# Patient Record
Sex: MALE | Race: WHITE | NOT HISPANIC OR LATINO | Employment: FULL TIME | URBAN - METROPOLITAN AREA
[De-identification: names, ages, dates, MRNs, and addresses within clinical notes are randomized per-mention and may not be internally consistent; named-entity substitution may affect disease eponyms.]

---

## 2021-09-29 ENCOUNTER — LAB (OUTPATIENT)
Dept: LAB | Facility: HOSPITAL | Age: 46
End: 2021-09-29

## 2021-09-29 ENCOUNTER — OFFICE VISIT (OUTPATIENT)
Dept: INTERNAL MEDICINE | Facility: CLINIC | Age: 46
End: 2021-09-29

## 2021-09-29 VITALS
HEIGHT: 73 IN | SYSTOLIC BLOOD PRESSURE: 110 MMHG | DIASTOLIC BLOOD PRESSURE: 74 MMHG | TEMPERATURE: 96.9 F | WEIGHT: 188 LBS | HEART RATE: 76 BPM | OXYGEN SATURATION: 98 % | BODY MASS INDEX: 24.92 KG/M2

## 2021-09-29 DIAGNOSIS — Z00.00 ROUTINE GENERAL MEDICAL EXAMINATION AT A HEALTH CARE FACILITY: ICD-10-CM

## 2021-09-29 DIAGNOSIS — E78.49 OTHER HYPERLIPIDEMIA: Primary | ICD-10-CM

## 2021-09-29 DIAGNOSIS — Z12.11 SCREEN FOR COLON CANCER: ICD-10-CM

## 2021-09-29 DIAGNOSIS — Z12.5 SCREENING FOR PROSTATE CANCER: ICD-10-CM

## 2021-09-29 DIAGNOSIS — M17.0 PRIMARY OSTEOARTHRITIS OF BOTH KNEES: ICD-10-CM

## 2021-09-29 DIAGNOSIS — M51.37 DEGENERATION OF LUMBAR OR LUMBOSACRAL INTERVERTEBRAL DISC: ICD-10-CM

## 2021-09-29 PROBLEM — M51.379 DEGENERATION OF LUMBAR OR LUMBOSACRAL INTERVERTEBRAL DISC: Status: ACTIVE | Noted: 2021-09-29

## 2021-09-29 PROBLEM — I48.20 CHRONIC ATRIAL FIBRILLATION: Status: ACTIVE | Noted: 2021-09-29

## 2021-09-29 LAB
BILIRUB BLD-MCNC: NEGATIVE MG/DL
CHOLEST SERPL-MCNC: 230 MG/DL (ref 0–200)
CLARITY, POC: ABNORMAL
COLOR UR: YELLOW
DEPRECATED RDW RBC AUTO: 43.2 FL (ref 37–54)
ERYTHROCYTE [DISTWIDTH] IN BLOOD BY AUTOMATED COUNT: 13.6 % (ref 12.3–15.4)
GLUCOSE UR STRIP-MCNC: NEGATIVE MG/DL
HCT VFR BLD AUTO: 46.9 % (ref 37.5–51)
HDLC SERPL-MCNC: 47 MG/DL (ref 40–60)
HGB BLD-MCNC: 15.9 G/DL (ref 13–17.7)
KETONES UR QL: NEGATIVE
LDLC SERPL CALC-MCNC: 167 MG/DL (ref 0–100)
LDLC/HDLC SERPL: 3.51 {RATIO}
LEUKOCYTE EST, POC: NEGATIVE
MCH RBC QN AUTO: 29.7 PG (ref 26.6–33)
MCHC RBC AUTO-ENTMCNC: 33.9 G/DL (ref 31.5–35.7)
MCV RBC AUTO: 87.5 FL (ref 79–97)
NITRITE UR-MCNC: NEGATIVE MG/ML
PH UR: 6 [PH] (ref 5–8)
PLATELET # BLD AUTO: 186 10*3/MM3 (ref 140–450)
PMV BLD AUTO: 11.1 FL (ref 6–12)
PROT UR STRIP-MCNC: NEGATIVE MG/DL
RBC # BLD AUTO: 5.36 10*6/MM3 (ref 4.14–5.8)
RBC # UR STRIP: NEGATIVE /UL
SP GR UR: 1.02 (ref 1–1.03)
TRIGL SERPL-MCNC: 89 MG/DL (ref 0–150)
UROBILINOGEN UR QL: NORMAL
VLDLC SERPL-MCNC: 16 MG/DL (ref 5–40)
WBC # BLD AUTO: 7.67 10*3/MM3 (ref 3.4–10.8)

## 2021-09-29 PROCEDURE — 80061 LIPID PANEL: CPT | Performed by: INTERNAL MEDICINE

## 2021-09-29 PROCEDURE — G0103 PSA SCREENING: HCPCS | Performed by: INTERNAL MEDICINE

## 2021-09-29 PROCEDURE — 84443 ASSAY THYROID STIM HORMONE: CPT | Performed by: INTERNAL MEDICINE

## 2021-09-29 PROCEDURE — 81003 URINALYSIS AUTO W/O SCOPE: CPT | Performed by: INTERNAL MEDICINE

## 2021-09-29 PROCEDURE — 82607 VITAMIN B-12: CPT | Performed by: INTERNAL MEDICINE

## 2021-09-29 PROCEDURE — 85027 COMPLETE CBC AUTOMATED: CPT | Performed by: INTERNAL MEDICINE

## 2021-09-29 PROCEDURE — 99204 OFFICE O/P NEW MOD 45 MIN: CPT | Performed by: INTERNAL MEDICINE

## 2021-09-29 RX ORDER — ATORVASTATIN CALCIUM 10 MG/1
10 TABLET, FILM COATED ORAL NIGHTLY
COMMUNITY
Start: 2021-09-14 | End: 2021-10-01 | Stop reason: DRUGHIGH

## 2021-09-29 RX ORDER — TIZANIDINE 4 MG/1
TABLET ORAL
COMMUNITY
Start: 2021-09-14

## 2021-09-29 NOTE — PROGRESS NOTES
Patient is a 45 y.o. male who is here to establish care for back pain and knee pain.  Chief Complaint   Patient presents with   • Back Pain   • Knee Pain         HPI:    Here to manage low back pain.  Has hx of lumbar facet injections.  Also has issues with L>R knee pain.  Pain has been chronic.  Is very active with his work.  Using otc meds.  No difficulty sleeping.  Worst with standing and sitting for prolonged time.    Has hx of hyperlipidemia.  Compliant with Lipitor.  No dizziness or lightheadedness or HAs.  No abdominal pains.      History:     Patient Active Problem List   Diagnosis   • Chronic atrial fibrillation (CMS/HCC)   • Other hyperlipidemia   • Primary osteoarthritis of both knees   • Degeneration of lumbar or lumbosacral intervertebral disc       Past Medical History:   Diagnosis Date   • Nephrolithiasis        Past Surgical History:   Procedure Laterality Date   • APPENDECTOMY  09/2010   • CARDIAC ABLATION  11/2019   • CERVICAL FUSION  10/2012   • KNEE ARTHROSCOPY     • SHOULDER ARTHROSCOPY         Current Outpatient Medications on File Prior to Visit   Medication Sig   • atorvastatin (LIPITOR) 10 MG tablet Take 10 mg by mouth Every Night.   • tiZANidine (ZANAFLEX) 4 MG tablet      No current facility-administered medications on file prior to visit.       Family History   Problem Relation Age of Onset   • Heart attack Mother    • Cancer Maternal Grandmother    • Lung cancer Paternal Grandfather        Social History     Socioeconomic History   • Marital status:      Spouse name: Not on file   • Number of children: Not on file   • Years of education: Not on file   • Highest education level: Not on file   Tobacco Use   • Smoking status: Never Smoker   • Smokeless tobacco: Never Used   Substance and Sexual Activity   • Alcohol use: Yes   • Drug use: Never         Review of Systems   Constitutional: Negative for chills, fatigue, fever and unexpected weight change.   HENT: Negative for congestion,  "ear pain, hearing loss, rhinorrhea, sinus pressure, sore throat and trouble swallowing.    Eyes: Negative for discharge and itching.   Respiratory: Negative for cough, chest tightness and shortness of breath.    Cardiovascular: Negative for chest pain, palpitations and leg swelling.   Gastrointestinal: Negative for abdominal pain, blood in stool, constipation, diarrhea and vomiting.   Endocrine: Negative for polydipsia and polyuria.   Genitourinary: Negative for difficulty urinating, dysuria, enuresis, frequency, hematuria and urgency.        9/21 psa   Musculoskeletal: Negative for arthralgias, back pain, gait problem and joint swelling.   Skin: Negative for rash and wound.   Allergic/Immunologic: Negative for immunocompromised state.   Neurological: Negative for dizziness, syncope, weakness, light-headedness, numbness and headaches.   Hematological: Does not bruise/bleed easily.   Psychiatric/Behavioral: Negative for behavioral problems, dysphoric mood and sleep disturbance. The patient is not nervous/anxious.        /74   Pulse 76   Temp 96.9 °F (36.1 °C) (Infrared)   Ht 185.4 cm (73\")   Wt 85.3 kg (188 lb)   SpO2 98%   BMI 24.80 kg/m²       Physical Exam  Constitutional:       Appearance: Normal appearance. He is well-developed.   HENT:      Head: Normocephalic and atraumatic.      Right Ear: External ear normal.      Left Ear: External ear normal.      Nose: Nose normal.      Mouth/Throat:      Mouth: Mucous membranes are moist.      Pharynx: Oropharynx is clear.   Eyes:      Extraocular Movements: Extraocular movements intact.      Conjunctiva/sclera: Conjunctivae normal.      Pupils: Pupils are equal, round, and reactive to light.   Cardiovascular:      Rate and Rhythm: Normal rate and regular rhythm.      Pulses: Normal pulses.      Heart sounds: Normal heart sounds.   Pulmonary:      Effort: Pulmonary effort is normal.      Breath sounds: Normal breath sounds.   Abdominal:      General: Bowel " sounds are normal.      Palpations: Abdomen is soft.   Musculoskeletal:         General: Normal range of motion.      Cervical back: Normal range of motion and neck supple.   Lymphadenopathy:      Cervical: No cervical adenopathy.   Skin:     General: Skin is warm and dry.   Neurological:      General: No focal deficit present.      Mental Status: He is alert and oriented to person, place, and time.   Psychiatric:         Mood and Affect: Mood normal.         Behavior: Behavior normal.         Thought Content: Thought content normal.         Procedure:      Discussion/Summary:    Hyperlipidemia-counseled on diet, fasting labs today not at goal, advised to increase to Lipitor 20 mg  Hx of Afib-no recurrence with the ablation  Knee osteoarthritis-ortho to see  Low back pain-schedule mri  Other-schedule colonoscopy screening and fasting labs today    9/29 labs noted and dw patient    Current Outpatient Medications:   •  atorvastatin (LIPITOR) 10 MG tablet, Take 10 mg by mouth Every Night., Disp: , Rfl:   •  tiZANidine (ZANAFLEX) 4 MG tablet, , Disp: , Rfl:         Diagnoses and all orders for this visit:    1. Other hyperlipidemia (Primary)  -     Lipid Panel  -     TSH    2. Primary osteoarthritis of both knees  -     Cancel: Ambulatory Referral to Orthopedic Surgery  -     Ambulatory Referral to Orthopedic Surgery    3. Degeneration of lumbar or lumbosacral intervertebral disc  -     MRI Lumbar Spine Without Contrast    4. Screening for prostate cancer  -     PSA Screen    5. Screen for colon cancer  -     Ambulatory Referral to Gastroenterology    6. Routine general medical examination at a health care facility  -     CBC (No Diff)  -     Vitamin B12  -     POC Urinalysis Dipstick, Automated

## 2021-09-30 LAB
PSA SERPL-MCNC: 0.9 NG/ML (ref 0–4)
TSH SERPL DL<=0.05 MIU/L-ACNC: 2.67 UIU/ML (ref 0.27–4.2)
VIT B12 BLD-MCNC: 597 PG/ML (ref 211–946)

## 2021-10-01 ENCOUNTER — TELEPHONE (OUTPATIENT)
Dept: INTERNAL MEDICINE | Facility: CLINIC | Age: 46
End: 2021-10-01

## 2021-10-01 RX ORDER — ATORVASTATIN CALCIUM 20 MG/1
20 TABLET, FILM COATED ORAL NIGHTLY
Qty: 90 TABLET | Refills: 3 | Status: SHIPPED | OUTPATIENT
Start: 2021-10-01 | End: 2022-10-13

## 2021-10-01 NOTE — TELEPHONE ENCOUNTER
Caller: Ronaldo Cantu    Relationship: Self    Best call back number:     [phone]     What is the best time to reach you:     ANY TIME    Who are you requesting to speak with (clinical staff, provider,  specific staff member):     DR RIVAS    Do you know the name of the person who called:     N/A    What was the call regarding:    atorvastatin (LIPITOR) 10 MG tablet    PATIENT STATED DR RIVAS DECIDED TO INCREASE THE DOSAGE OF THE MEDICATION LISTED ABOVE SINCE PATIENT'S BLOODWORK SHOWED AN INCREASE IN NUMBERS    Do you require a callback:    YES IF THERE ARE ANY QUESTIONS FOR PATIENT    DR RIVAS

## 2021-10-21 ENCOUNTER — HOSPITAL ENCOUNTER (OUTPATIENT)
Dept: MRI IMAGING | Facility: HOSPITAL | Age: 46
Discharge: HOME | End: 2021-10-21
Admitting: INTERNAL MEDICINE

## 2021-10-21 PROCEDURE — 72148 MRI LUMBAR SPINE W/O DYE: CPT

## 2021-10-22 DIAGNOSIS — M51.37 DEGENERATION OF LUMBAR OR LUMBOSACRAL INTERVERTEBRAL DISC: Primary | ICD-10-CM

## 2021-11-24 RX ORDER — SOD SULF/POT CHLORIDE/MAG SULF 1.479 G
1 TABLET ORAL ONCE
Qty: 24 TABLET | Refills: 0 | Status: SHIPPED | OUTPATIENT
Start: 2021-11-24 | End: 2021-11-24

## 2021-12-09 ENCOUNTER — OUTSIDE FACILITY SERVICE (OUTPATIENT)
Dept: GASTROENTEROLOGY | Facility: CLINIC | Age: 46
End: 2021-12-09

## 2021-12-09 PROCEDURE — 45380 COLONOSCOPY AND BIOPSY: CPT | Performed by: INTERNAL MEDICINE

## 2022-03-28 ENCOUNTER — LAB (OUTPATIENT)
Dept: LAB | Facility: HOSPITAL | Age: 47
End: 2022-03-28

## 2022-03-28 ENCOUNTER — OFFICE VISIT (OUTPATIENT)
Dept: INTERNAL MEDICINE | Facility: CLINIC | Age: 47
End: 2022-03-28

## 2022-03-28 VITALS
HEIGHT: 73 IN | WEIGHT: 182 LBS | HEART RATE: 78 BPM | BODY MASS INDEX: 24.12 KG/M2 | SYSTOLIC BLOOD PRESSURE: 120 MMHG | TEMPERATURE: 96.9 F | OXYGEN SATURATION: 99 % | DIASTOLIC BLOOD PRESSURE: 76 MMHG

## 2022-03-28 DIAGNOSIS — B35.1 TOENAIL FUNGUS: ICD-10-CM

## 2022-03-28 DIAGNOSIS — M17.0 PRIMARY OSTEOARTHRITIS OF BOTH KNEES: ICD-10-CM

## 2022-03-28 DIAGNOSIS — E78.49 OTHER HYPERLIPIDEMIA: Primary | ICD-10-CM

## 2022-03-28 LAB
DEPRECATED RDW RBC AUTO: 40.4 FL (ref 37–54)
ERYTHROCYTE [DISTWIDTH] IN BLOOD BY AUTOMATED COUNT: 12.6 % (ref 12.3–15.4)
HCT VFR BLD AUTO: 44.1 % (ref 37.5–51)
HGB BLD-MCNC: 15.2 G/DL (ref 13–17.7)
MCH RBC QN AUTO: 30.3 PG (ref 26.6–33)
MCHC RBC AUTO-ENTMCNC: 34.5 G/DL (ref 31.5–35.7)
MCV RBC AUTO: 88 FL (ref 79–97)
PLATELET # BLD AUTO: 171 10*3/MM3 (ref 140–450)
PMV BLD AUTO: 11.6 FL (ref 6–12)
RBC # BLD AUTO: 5.01 10*6/MM3 (ref 4.14–5.8)
WBC NRBC COR # BLD: 8.11 10*3/MM3 (ref 3.4–10.8)

## 2022-03-28 PROCEDURE — 85027 COMPLETE CBC AUTOMATED: CPT | Performed by: INTERNAL MEDICINE

## 2022-03-28 PROCEDURE — 80061 LIPID PANEL: CPT | Performed by: INTERNAL MEDICINE

## 2022-03-28 PROCEDURE — 99214 OFFICE O/P EST MOD 30 MIN: CPT | Performed by: INTERNAL MEDICINE

## 2022-03-28 PROCEDURE — 80053 COMPREHEN METABOLIC PANEL: CPT | Performed by: INTERNAL MEDICINE

## 2022-03-28 NOTE — PROGRESS NOTES
Patient is a 46 y.o. male who is here for a follow up of hyperlipidemia.  Chief Complaint   Patient presents with   • Hyperlipidemia         HPI:    Here for mgmt of hyperlipidemia.  Compliant with lipitor.  Since Covid booster has loss of appetite/HAs and cramps in feet.  Energy level is good.  Easily fatigued.      History:     Patient Active Problem List   Diagnosis   • Chronic atrial fibrillation (HCC)   • Other hyperlipidemia   • Primary osteoarthritis of both knees   • Degeneration of lumbar or lumbosacral intervertebral disc   • Toenail fungus       Past Medical History:   Diagnosis Date   • Nephrolithiasis        Past Surgical History:   Procedure Laterality Date   • APPENDECTOMY  09/2010   • CARDIAC ABLATION  11/2019   • CERVICAL FUSION  10/2012   • KNEE ARTHROSCOPY     • SHOULDER ARTHROSCOPY         Current Outpatient Medications on File Prior to Visit   Medication Sig   • atorvastatin (LIPITOR) 20 MG tablet Take 1 tablet by mouth Every Night. Increase in strength   • tiZANidine (ZANAFLEX) 4 MG tablet      No current facility-administered medications on file prior to visit.       Family History   Problem Relation Age of Onset   • Heart attack Mother    • Cancer Maternal Grandmother    • Lung cancer Paternal Grandfather        Social History     Socioeconomic History   • Marital status:    Tobacco Use   • Smoking status: Never Smoker   • Smokeless tobacco: Never Used   Substance and Sexual Activity   • Alcohol use: Yes   • Drug use: Never         Review of Systems   Constitutional: Negative for chills, fatigue, fever and unexpected weight change.   HENT: Negative for congestion, ear pain, hearing loss, rhinorrhea, sinus pressure, sore throat and trouble swallowing.    Eyes: Negative for discharge and itching.   Respiratory: Negative for cough, chest tightness and shortness of breath.    Cardiovascular: Negative for chest pain, palpitations and leg swelling.   Gastrointestinal: Negative for abdominal  "pain, blood in stool, constipation, diarrhea and vomiting.        12/21 without polyps    Endocrine: Negative for polydipsia and polyuria.   Genitourinary: Negative for difficulty urinating, dysuria, enuresis, frequency, hematuria and urgency.        9/21 psa   Musculoskeletal: Negative for arthralgias, back pain, gait problem and joint swelling.   Skin: Negative for rash and wound.   Allergic/Immunologic: Negative for immunocompromised state.   Neurological: Positive for headaches. Negative for dizziness, syncope, weakness, light-headedness and numbness.   Hematological: Does not bruise/bleed easily.   Psychiatric/Behavioral: Negative for behavioral problems, dysphoric mood and sleep disturbance. The patient is not nervous/anxious.        /76   Pulse 78   Temp 96.9 °F (36.1 °C) (Infrared)   Ht 185.4 cm (72.99\")   Wt 82.6 kg (182 lb)   SpO2 99%   BMI 24.02 kg/m²       Physical Exam  Constitutional:       Appearance: Normal appearance. He is well-developed.   HENT:      Head: Normocephalic and atraumatic.      Right Ear: External ear normal.      Left Ear: External ear normal.      Nose: Nose normal.      Mouth/Throat:      Mouth: Mucous membranes are moist.      Pharynx: Oropharynx is clear.   Eyes:      Extraocular Movements: Extraocular movements intact.      Conjunctiva/sclera: Conjunctivae normal.      Pupils: Pupils are equal, round, and reactive to light.   Cardiovascular:      Rate and Rhythm: Normal rate and regular rhythm.      Pulses: Normal pulses.      Heart sounds: Normal heart sounds.   Pulmonary:      Effort: Pulmonary effort is normal.      Breath sounds: Normal breath sounds.   Abdominal:      General: Bowel sounds are normal.      Palpations: Abdomen is soft.   Musculoskeletal:         General: Normal range of motion.      Cervical back: Normal range of motion and neck supple.   Lymphadenopathy:      Cervical: No cervical adenopathy.   Skin:     General: Skin is warm and dry.      " Comments: Left 1st toenail thickening and yellow   Neurological:      General: No focal deficit present.      Mental Status: He is alert and oriented to person, place, and time.   Psychiatric:         Mood and Affect: Mood normal.         Behavior: Behavior normal.         Thought Content: Thought content normal.         Procedure:      Discussion/Summary:    Hyperlipidemia-counseled on diet, fasting labs today on Lipitor 20 mg  Hx of Afib-no recurrence with the ablation  Knee osteoarthritis-f/u Ortho  Nail fungus-check LFTs , if normal will rx lamisil     3/28 labs noted and dw patient    Current Outpatient Medications:   •  atorvastatin (LIPITOR) 20 MG tablet, Take 1 tablet by mouth Every Night. Increase in strength, Disp: 90 tablet, Rfl: 3  •  tiZANidine (ZANAFLEX) 4 MG tablet, , Disp: , Rfl:   •  terbinafine (LamISIL) 250 MG tablet, Take 1 tablet by mouth Daily. Need liver test after 1 month, Disp: 90 tablet, Rfl: 0        Diagnoses and all orders for this visit:    1. Other hyperlipidemia (Primary)  -     Comprehensive Metabolic Panel  -     Lipid Panel    2. Primary osteoarthritis of both knees  -     CBC (No Diff)    3. Toenail fungus  -     terbinafine (LamISIL) 250 MG tablet; Take 1 tablet by mouth Daily. Need liver test after 1 month  Dispense: 90 tablet; Refill: 0        Answers for HPI/ROS submitted by the patient on 3/28/2022  What is the primary reason for your visit?: Other  Please describe your symptoms.: Follow up  Have you had these symptoms before?: No  How long have you been having these symptoms?: Greater than 2 weeks

## 2022-03-29 LAB
ALBUMIN SERPL-MCNC: 4.3 G/DL (ref 3.5–5.2)
ALBUMIN/GLOB SERPL: 1.5 G/DL
ALP SERPL-CCNC: 85 U/L (ref 39–117)
ALT SERPL W P-5'-P-CCNC: 26 U/L (ref 1–41)
ANION GAP SERPL CALCULATED.3IONS-SCNC: 12.1 MMOL/L (ref 5–15)
AST SERPL-CCNC: 17 U/L (ref 1–40)
BILIRUB SERPL-MCNC: 0.4 MG/DL (ref 0–1.2)
BUN SERPL-MCNC: 12 MG/DL (ref 6–20)
BUN/CREAT SERPL: 11.9 (ref 7–25)
CALCIUM SPEC-SCNC: 9.4 MG/DL (ref 8.6–10.5)
CHLORIDE SERPL-SCNC: 103 MMOL/L (ref 98–107)
CHOLEST SERPL-MCNC: 153 MG/DL (ref 0–200)
CO2 SERPL-SCNC: 27.9 MMOL/L (ref 22–29)
CREAT SERPL-MCNC: 1.01 MG/DL (ref 0.76–1.27)
EGFRCR SERPLBLD CKD-EPI 2021: 91.7 ML/MIN/1.73
GLOBULIN UR ELPH-MCNC: 2.8 GM/DL
GLUCOSE SERPL-MCNC: 77 MG/DL (ref 65–99)
HDLC SERPL-MCNC: 50 MG/DL (ref 40–60)
LDLC SERPL CALC-MCNC: 86 MG/DL (ref 0–100)
LDLC/HDLC SERPL: 1.7 {RATIO}
POTASSIUM SERPL-SCNC: 4.1 MMOL/L (ref 3.5–5.2)
PROT SERPL-MCNC: 7.1 G/DL (ref 6–8.5)
SODIUM SERPL-SCNC: 143 MMOL/L (ref 136–145)
TRIGL SERPL-MCNC: 89 MG/DL (ref 0–150)
VLDLC SERPL-MCNC: 17 MG/DL (ref 5–40)

## 2022-03-29 RX ORDER — TERBINAFINE HYDROCHLORIDE 250 MG/1
250 TABLET ORAL DAILY
Qty: 90 TABLET | Refills: 0 | Status: SHIPPED | OUTPATIENT
Start: 2022-03-29 | End: 2022-09-30

## 2022-05-20 ENCOUNTER — TRANSCRIBE ORDERS (OUTPATIENT)
Dept: CARDIOLOGY | Facility: HOSPITAL | Age: 47
End: 2022-05-20

## 2022-05-20 ENCOUNTER — LAB (OUTPATIENT)
Dept: LAB | Facility: HOSPITAL | Age: 47
End: 2022-05-20

## 2022-05-20 DIAGNOSIS — Z01.810 PRE-OPERATIVE CARDIOVASCULAR EXAMINATION: Primary | ICD-10-CM

## 2022-05-20 DIAGNOSIS — Z01.810 PRE-OPERATIVE CARDIOVASCULAR EXAMINATION: ICD-10-CM

## 2022-05-20 DIAGNOSIS — Z01.812 PRE-OPERATIVE LABORATORY EXAMINATION: Primary | ICD-10-CM

## 2022-05-20 LAB
ALBUMIN SERPL-MCNC: 4.7 G/DL (ref 3.5–5.2)
ALBUMIN/GLOB SERPL: 2 G/DL
ALP SERPL-CCNC: 95 U/L (ref 39–117)
ALT SERPL W P-5'-P-CCNC: 22 U/L (ref 1–41)
ANION GAP SERPL CALCULATED.3IONS-SCNC: 13 MMOL/L (ref 5–15)
AST SERPL-CCNC: 17 U/L (ref 1–40)
BASOPHILS # BLD AUTO: 0.04 10*3/MM3 (ref 0–0.2)
BASOPHILS NFR BLD AUTO: 0.5 % (ref 0–1.5)
BILIRUB SERPL-MCNC: 0.3 MG/DL (ref 0–1.2)
BUN SERPL-MCNC: 12 MG/DL (ref 6–20)
BUN/CREAT SERPL: 10.7 (ref 7–25)
CALCIUM SPEC-SCNC: 9.4 MG/DL (ref 8.6–10.5)
CHLORIDE SERPL-SCNC: 101 MMOL/L (ref 98–107)
CO2 SERPL-SCNC: 26 MMOL/L (ref 22–29)
CREAT SERPL-MCNC: 1.12 MG/DL (ref 0.76–1.27)
DEPRECATED RDW RBC AUTO: 39.5 FL (ref 37–54)
EGFRCR SERPLBLD CKD-EPI 2021: 81 ML/MIN/1.73
EOSINOPHIL # BLD AUTO: 0.21 10*3/MM3 (ref 0–0.4)
EOSINOPHIL NFR BLD AUTO: 2.7 % (ref 0.3–6.2)
ERYTHROCYTE [DISTWIDTH] IN BLOOD BY AUTOMATED COUNT: 12.8 % (ref 12.3–15.4)
GLOBULIN UR ELPH-MCNC: 2.3 GM/DL
GLUCOSE SERPL-MCNC: 80 MG/DL (ref 65–99)
HCT VFR BLD AUTO: 46.6 % (ref 37.5–51)
HGB BLD-MCNC: 15.9 G/DL (ref 13–17.7)
IMM GRANULOCYTES # BLD AUTO: 0.02 10*3/MM3 (ref 0–0.05)
IMM GRANULOCYTES NFR BLD AUTO: 0.3 % (ref 0–0.5)
LYMPHOCYTES # BLD AUTO: 2.19 10*3/MM3 (ref 0.7–3.1)
LYMPHOCYTES NFR BLD AUTO: 27.8 % (ref 19.6–45.3)
MCH RBC QN AUTO: 29.5 PG (ref 26.6–33)
MCHC RBC AUTO-ENTMCNC: 34.1 G/DL (ref 31.5–35.7)
MCV RBC AUTO: 86.5 FL (ref 79–97)
MONOCYTES # BLD AUTO: 0.79 10*3/MM3 (ref 0.1–0.9)
MONOCYTES NFR BLD AUTO: 10 % (ref 5–12)
NEUTROPHILS NFR BLD AUTO: 4.63 10*3/MM3 (ref 1.7–7)
NEUTROPHILS NFR BLD AUTO: 58.7 % (ref 42.7–76)
NRBC BLD AUTO-RTO: 0 /100 WBC (ref 0–0.2)
PLATELET # BLD AUTO: 191 10*3/MM3 (ref 140–450)
PMV BLD AUTO: 10.8 FL (ref 6–12)
POTASSIUM SERPL-SCNC: 4.4 MMOL/L (ref 3.5–5.2)
PROT SERPL-MCNC: 7 G/DL (ref 6–8.5)
QT INTERVAL: 394 MS
QTC INTERVAL: 418 MS
RBC # BLD AUTO: 5.39 10*6/MM3 (ref 4.14–5.8)
SODIUM SERPL-SCNC: 140 MMOL/L (ref 136–145)
WBC NRBC COR # BLD: 7.88 10*3/MM3 (ref 3.4–10.8)

## 2022-05-20 PROCEDURE — 93010 ELECTROCARDIOGRAM REPORT: CPT | Performed by: INTERNAL MEDICINE

## 2022-05-20 PROCEDURE — 36415 COLL VENOUS BLD VENIPUNCTURE: CPT

## 2022-05-20 PROCEDURE — 80053 COMPREHEN METABOLIC PANEL: CPT

## 2022-05-20 PROCEDURE — 93005 ELECTROCARDIOGRAM TRACING: CPT

## 2022-05-20 PROCEDURE — 85025 COMPLETE CBC W/AUTO DIFF WBC: CPT

## 2022-09-30 ENCOUNTER — OFFICE VISIT (OUTPATIENT)
Dept: INTERNAL MEDICINE | Facility: CLINIC | Age: 47
End: 2022-09-30

## 2022-09-30 VITALS
SYSTOLIC BLOOD PRESSURE: 110 MMHG | BODY MASS INDEX: 24.12 KG/M2 | WEIGHT: 182 LBS | HEIGHT: 73 IN | HEART RATE: 76 BPM | OXYGEN SATURATION: 98 % | DIASTOLIC BLOOD PRESSURE: 70 MMHG | TEMPERATURE: 96.9 F

## 2022-09-30 DIAGNOSIS — I48.20 CHRONIC ATRIAL FIBRILLATION: ICD-10-CM

## 2022-09-30 DIAGNOSIS — Z00.00 ROUTINE GENERAL MEDICAL EXAMINATION AT A HEALTH CARE FACILITY: ICD-10-CM

## 2022-09-30 DIAGNOSIS — Z12.5 SCREENING FOR PROSTATE CANCER: ICD-10-CM

## 2022-09-30 DIAGNOSIS — E78.49 OTHER HYPERLIPIDEMIA: Primary | ICD-10-CM

## 2022-09-30 PROCEDURE — 99396 PREV VISIT EST AGE 40-64: CPT | Performed by: INTERNAL MEDICINE

## 2022-09-30 NOTE — PROGRESS NOTES
Patient is a 46 y.o. male who is here for a physical.  Chief Complaint   Patient presents with   • Annual Exam         HPI:    Here for CPE.      History:     Patient Active Problem List   Diagnosis   • Chronic atrial fibrillation (HCC)   • Other hyperlipidemia   • Primary osteoarthritis of both knees   • Degeneration of lumbar or lumbosacral intervertebral disc   • Toenail fungus       Past Medical History:   Diagnosis Date   • Nephrolithiasis        Past Surgical History:   Procedure Laterality Date   • APPENDECTOMY  09/2010   • CARDIAC ABLATION  11/2019   • CERVICAL FUSION  10/2012   • KNEE ARTHROSCOPY     • SHOULDER ARTHROSCOPY         Current Outpatient Medications on File Prior to Visit   Medication Sig   • atorvastatin (LIPITOR) 20 MG tablet Take 1 tablet by mouth Every Night. Increase in strength   • tiZANidine (ZANAFLEX) 4 MG tablet    • [DISCONTINUED] terbinafine (LamISIL) 250 MG tablet Take 1 tablet by mouth Daily. Need liver test after 1 month     No current facility-administered medications on file prior to visit.       Family History   Problem Relation Age of Onset   • Heart attack Mother    • Cancer Maternal Grandmother    • Lung cancer Paternal Grandfather        Social History     Socioeconomic History   • Marital status:    Tobacco Use   • Smoking status: Never Smoker   • Smokeless tobacco: Never Used   Substance and Sexual Activity   • Alcohol use: Yes   • Drug use: Never         Review of Systems   Constitutional: Negative for chills, fatigue, fever and unexpected weight change.   HENT: Negative for congestion, ear pain, hearing loss, rhinorrhea, sinus pressure, sore throat and trouble swallowing.    Eyes: Negative for discharge and itching.   Respiratory: Negative for cough, chest tightness and shortness of breath.    Cardiovascular: Negative for chest pain, palpitations and leg swelling.   Gastrointestinal: Negative for abdominal pain, blood in stool, constipation, diarrhea and vomiting.  "       12/21 without polyps    Endocrine: Negative for polydipsia and polyuria.   Genitourinary: Negative for difficulty urinating, dysuria, enuresis, frequency, hematuria and urgency.        9/21 psa   Musculoskeletal: Positive for arthralgias. Negative for back pain, gait problem and joint swelling.   Skin: Negative for rash and wound.   Allergic/Immunologic: Negative for immunocompromised state.   Neurological: Negative for dizziness, syncope, weakness, light-headedness and numbness.   Hematological: Does not bruise/bleed easily.   Psychiatric/Behavioral: Negative for behavioral problems, dysphoric mood and sleep disturbance. The patient is not nervous/anxious.        /70   Pulse 76   Temp 96.9 °F (36.1 °C) (Infrared)   Ht 185.4 cm (72.99\")   Wt 82.6 kg (182 lb)   SpO2 98%   BMI 24.02 kg/m²       Physical Exam  Constitutional:       Appearance: Normal appearance. He is well-developed.   HENT:      Head: Normocephalic and atraumatic.      Right Ear: External ear normal.      Left Ear: External ear normal.      Nose: Nose normal.      Mouth/Throat:      Mouth: Mucous membranes are moist.      Pharynx: Oropharynx is clear.   Eyes:      Extraocular Movements: Extraocular movements intact.      Conjunctiva/sclera: Conjunctivae normal.      Pupils: Pupils are equal, round, and reactive to light.   Cardiovascular:      Rate and Rhythm: Normal rate and regular rhythm.      Pulses: Normal pulses.      Heart sounds: Normal heart sounds.   Pulmonary:      Effort: Pulmonary effort is normal.      Breath sounds: Normal breath sounds.   Abdominal:      General: Bowel sounds are normal.      Palpations: Abdomen is soft.   Genitourinary:     Penis: Normal.       Testes: Normal.   Musculoskeletal:         General: Normal range of motion.      Cervical back: Normal range of motion and neck supple.   Lymphadenopathy:      Cervical: No cervical adenopathy.   Skin:     General: Skin is warm and dry.      Comments: Left " 1st toenail thickening and yellow   Neurological:      General: No focal deficit present.      Mental Status: He is alert and oriented to person, place, and time.   Psychiatric:         Mood and Affect: Mood normal.         Behavior: Behavior normal.         Thought Content: Thought content normal.         Procedure:      Discussion/Summary:    HME-counseled on diet and exercise, fasting labs soon  Hyperlipidemia-counseled on diet, fasting soon on Lipitor 20 mg  Hx of Afib-no recurrence with the ablation  Knee osteoarthritis-f/u Ortho     3/28 labs noted and dw patient    Reviewed the following with the patient: advised patient to avoid alcoholic beverages and encouraged patient to exercise 5-7 days per week for 30 minutes at a time.      Current Outpatient Medications:   •  atorvastatin (LIPITOR) 20 MG tablet, Take 1 tablet by mouth Every Night. Increase in strength, Disp: 90 tablet, Rfl: 3  •  tiZANidine (ZANAFLEX) 4 MG tablet, , Disp: , Rfl:         Diagnoses and all orders for this visit:    1. Other hyperlipidemia (Primary)  -     Lipid Panel; Future    2. Chronic atrial fibrillation (HCC)    3. Routine general medical examination at a health care facility  -     CBC (No Diff); Future  -     Comprehensive Metabolic Panel; Future  -     Lipid Panel; Future  -     PSA Screen; Future  -     TSH; Future  -     Vitamin B12; Future    4. Screening for prostate cancer  -     PSA Screen; Future

## 2022-10-13 RX ORDER — ATORVASTATIN CALCIUM 20 MG/1
TABLET, FILM COATED ORAL
Qty: 90 TABLET | Refills: 3 | Status: SHIPPED | OUTPATIENT
Start: 2022-10-13

## 2022-10-18 ENCOUNTER — LAB (OUTPATIENT)
Dept: LAB | Facility: HOSPITAL | Age: 47
End: 2022-10-18

## 2022-10-18 DIAGNOSIS — Z12.5 SCREENING FOR PROSTATE CANCER: ICD-10-CM

## 2022-10-18 DIAGNOSIS — E78.49 OTHER HYPERLIPIDEMIA: ICD-10-CM

## 2022-10-18 DIAGNOSIS — Z00.00 ROUTINE GENERAL MEDICAL EXAMINATION AT A HEALTH CARE FACILITY: ICD-10-CM

## 2022-10-18 PROCEDURE — 80061 LIPID PANEL: CPT

## 2022-10-18 PROCEDURE — 82607 VITAMIN B-12: CPT

## 2022-10-18 PROCEDURE — 80050 GENERAL HEALTH PANEL: CPT

## 2022-10-18 PROCEDURE — G0103 PSA SCREENING: HCPCS

## 2022-10-19 LAB
ALBUMIN SERPL-MCNC: 4.2 G/DL (ref 3.5–5.2)
ALBUMIN/GLOB SERPL: 1.6 G/DL
ALP SERPL-CCNC: 87 U/L (ref 39–117)
ALT SERPL W P-5'-P-CCNC: 34 U/L (ref 1–41)
ANION GAP SERPL CALCULATED.3IONS-SCNC: 8.8 MMOL/L (ref 5–15)
AST SERPL-CCNC: 27 U/L (ref 1–40)
BILIRUB SERPL-MCNC: 0.6 MG/DL (ref 0–1.2)
BUN SERPL-MCNC: 13 MG/DL (ref 6–20)
BUN/CREAT SERPL: 11.6 (ref 7–25)
CALCIUM SPEC-SCNC: 9.6 MG/DL (ref 8.6–10.5)
CHLORIDE SERPL-SCNC: 104 MMOL/L (ref 98–107)
CHOLEST SERPL-MCNC: 138 MG/DL (ref 0–200)
CO2 SERPL-SCNC: 27.2 MMOL/L (ref 22–29)
CREAT SERPL-MCNC: 1.12 MG/DL (ref 0.76–1.27)
DEPRECATED RDW RBC AUTO: 42.3 FL (ref 37–54)
EGFRCR SERPLBLD CKD-EPI 2021: 81 ML/MIN/1.73
ERYTHROCYTE [DISTWIDTH] IN BLOOD BY AUTOMATED COUNT: 13.1 % (ref 12.3–15.4)
GLOBULIN UR ELPH-MCNC: 2.6 GM/DL
GLUCOSE SERPL-MCNC: 83 MG/DL (ref 65–99)
HCT VFR BLD AUTO: 44.3 % (ref 37.5–51)
HDLC SERPL-MCNC: 50 MG/DL (ref 40–60)
HGB BLD-MCNC: 14.9 G/DL (ref 13–17.7)
LDLC SERPL CALC-MCNC: 73 MG/DL (ref 0–100)
LDLC/HDLC SERPL: 1.46 {RATIO}
MCH RBC QN AUTO: 29.7 PG (ref 26.6–33)
MCHC RBC AUTO-ENTMCNC: 33.6 G/DL (ref 31.5–35.7)
MCV RBC AUTO: 88.2 FL (ref 79–97)
PLATELET # BLD AUTO: 167 10*3/MM3 (ref 140–450)
PMV BLD AUTO: 10.9 FL (ref 6–12)
POTASSIUM SERPL-SCNC: 4.1 MMOL/L (ref 3.5–5.2)
PROT SERPL-MCNC: 6.8 G/DL (ref 6–8.5)
PSA SERPL-MCNC: 0.8 NG/ML (ref 0–4)
RBC # BLD AUTO: 5.02 10*6/MM3 (ref 4.14–5.8)
SODIUM SERPL-SCNC: 140 MMOL/L (ref 136–145)
TRIGL SERPL-MCNC: 74 MG/DL (ref 0–150)
TSH SERPL DL<=0.05 MIU/L-ACNC: 2.52 UIU/ML (ref 0.27–4.2)
VIT B12 BLD-MCNC: 379 PG/ML (ref 211–946)
VLDLC SERPL-MCNC: 15 MG/DL (ref 5–40)
WBC NRBC COR # BLD: 6.06 10*3/MM3 (ref 3.4–10.8)

## 2022-11-21 ENCOUNTER — TELEPHONE (OUTPATIENT)
Dept: INTERNAL MEDICINE | Facility: CLINIC | Age: 47
End: 2022-11-21

## 2022-11-21 NOTE — TELEPHONE ENCOUNTER
Makayla from [facility] called stating that they have sent over a couple requests for the most recent office notes for the PT. They would like those notes faxed to [phone]

## 2022-12-19 ENCOUNTER — OFFICE VISIT (OUTPATIENT)
Dept: INTERNAL MEDICINE | Facility: CLINIC | Age: 47
End: 2022-12-19

## 2022-12-19 VITALS
HEART RATE: 82 BPM | OXYGEN SATURATION: 96 % | DIASTOLIC BLOOD PRESSURE: 70 MMHG | TEMPERATURE: 96.8 F | HEIGHT: 73 IN | BODY MASS INDEX: 23.75 KG/M2 | WEIGHT: 179.2 LBS | RESPIRATION RATE: 18 BRPM | SYSTOLIC BLOOD PRESSURE: 104 MMHG

## 2022-12-19 DIAGNOSIS — R41.840 ATTENTION DEFICIT: Primary | ICD-10-CM

## 2022-12-19 DIAGNOSIS — F33.8 SEASONAL DEPRESSION: ICD-10-CM

## 2022-12-19 PROCEDURE — 99213 OFFICE O/P EST LOW 20 MIN: CPT | Performed by: PHYSICIAN ASSISTANT

## 2022-12-19 RX ORDER — FLUOXETINE 10 MG/1
10 CAPSULE ORAL DAILY
Qty: 30 CAPSULE | Refills: 1 | Status: SHIPPED | OUTPATIENT
Start: 2022-12-19 | End: 2023-01-03

## 2022-12-19 NOTE — PROGRESS NOTES
"[facility]  [address]  [place] [address]  Dept: [phone]  Dept Fax: [phone]  Loc: [phone]  Loc Fax: [phone]    Ronaldo Cantu  1975    Follow Up Office Visit Note    History of Present Illness:  Pt is a 46 y/o male in today for attention deficit and seasonal affective disorder. Pt has been struggling with this for a while but has been getting worse. Denies any SI/HI. Gives three reasons why he will not hurt himself \"my wife, my dogs, and my family.\" Needing referral to psych for possible ADHD dx. Would like to start med for SAD.      The following portions of the patient's history were reviewed and updated as appropriate: allergies, current medications, past family history, past medical history, past social history, past surgical history, and problem list.    Medications:    Current Outpatient Medications:   •  atorvastatin (LIPITOR) 20 MG tablet, TAKE 1 TABLET BY MOUTH EVERY DAY IN THE EVENING, Disp: 90 tablet, Rfl: 3  •  tiZANidine (ZANAFLEX) 4 MG tablet, , Disp: , Rfl:   •  FLUoxetine (PROzac) 10 MG capsule, Take 1 capsule by mouth Daily., Disp: 30 capsule, Rfl: 1    Subjective  No Known Allergies     Past Medical History:   Diagnosis Date   • Nephrolithiasis        Past Surgical History:   Procedure Laterality Date   • APPENDECTOMY  09/2010   • CARDIAC ABLATION  11/2019   • CERVICAL FUSION  10/2012   • KNEE ARTHROSCOPY     • SHOULDER ARTHROSCOPY         Family History   Problem Relation Age of Onset   • Heart attack Mother    • Cancer Maternal Grandmother    • Lung cancer Paternal Grandfather         Social History     Socioeconomic History   • Marital status:    Tobacco Use   • Smoking status: Never   • Smokeless tobacco: Never   Substance and Sexual Activity   • Alcohol use: Yes   • Drug use: Never       Review of Systems   Constitutional: Negative for activity change, chills, fatigue, fever and unexpected weight " "change.   HENT: Negative for congestion, ear pain, postnasal drip, sinus pressure and sore throat.    Eyes: Negative for pain, discharge and redness.   Respiratory: Negative for cough, shortness of breath and wheezing.    Cardiovascular: Negative for chest pain, palpitations and leg swelling.   Gastrointestinal: Negative for diarrhea, nausea and vomiting.   Endocrine: Negative for cold intolerance and heat intolerance.   Genitourinary: Negative for decreased urine volume and dysuria.   Musculoskeletal: Negative for arthralgias and myalgias.   Skin: Negative for rash and wound.   Neurological: Negative for dizziness, light-headedness and headaches.   Hematological: Does not bruise/bleed easily.   Psychiatric/Behavioral: Positive for agitation, decreased concentration and dysphoric mood. Negative for confusion, self-injury, sleep disturbance and suicidal ideas. The patient is not nervous/anxious.          Objective  Vitals:    12/19/22 1008   BP: 104/70   BP Location: Left arm   Patient Position: Sitting   Cuff Size: Large Adult   Pulse: 82   Resp: 18   Temp: 96.8 °F (36 °C)   TempSrc: Temporal   SpO2: 96%   Weight: 81.3 kg (179 lb 3.2 oz)   Height: 185.4 cm (72.99\")     Body mass index is 23.65 kg/m².     Physical Exam  Physical Exam  Vitals and nursing note reviewed.   Constitutional:       General: He is not in acute distress.     Appearance: He is not ill-appearing.   HENT:      Head: Normocephalic.      Right Ear: Tympanic membrane, ear canal and external ear normal. There is no impacted cerumen.      Left Ear: Tympanic membrane, ear canal and external ear normal. There is no impacted cerumen.      Nose: No congestion or rhinorrhea.      Mouth/Throat:      Mouth: Mucous membranes are moist.      Pharynx: Oropharynx is clear. No oropharyngeal exudate or posterior oropharyngeal erythema.   Eyes:      General:         Right eye: No discharge.         Left eye: No discharge.      Extraocular Movements: Extraocular " movements intact.      Conjunctiva/sclera: Conjunctivae normal.      Pupils: Pupils are equal, round, and reactive to light.   Cardiovascular:      Rate and Rhythm: Normal rate and regular rhythm.      Heart sounds: Normal heart sounds. No murmur heard.    No friction rub. No gallop.   Pulmonary:      Effort: Pulmonary effort is normal. No respiratory distress.      Breath sounds: Normal breath sounds. No wheezing.   Abdominal:      General: Bowel sounds are normal. There is no distension.      Palpations: Abdomen is soft. There is no mass.      Tenderness: There is no abdominal tenderness.   Musculoskeletal:         General: No swelling. Normal range of motion.      Cervical back: Normal range of motion. No tenderness.      Right lower leg: No edema.      Left lower leg: No edema.   Lymphadenopathy:      Cervical: No cervical adenopathy.   Skin:     Findings: No bruising, erythema or rash.   Neurological:      Mental Status: He is oriented to person, place, and time.      Gait: Gait normal.   Psychiatric:         Thought Content: Thought content normal.         Judgment: Judgment normal.      Comments: Pt tearful on exam today.         Diagnostic Data  Procedures    Assessment  Diagnoses and all orders for this visit:    1. Attention deficit (Primary)  -     Ambulatory Referral to Psychiatry    2. Seasonal depression (HCC)  -     Ambulatory Referral to Psychiatry  -     FLUoxetine (PROzac) 10 MG capsule; Take 1 capsule by mouth Daily.  Dispense: 30 capsule; Refill: 1        Plan    1. Attention deficit (Primary)- worse, ref to psych.    2. Seasonal depression (HCC)- worse, start prozac 10 mg qd. Advised for any side effects to call back immediately. For SI/HI call 911. Patient verbalized understanding of all instructions given and complied.      Return in about 2 weeks (around 1/2/2023).    Gasper Waterman PA-C  12/19/2022  Answers for HPI/ROS submitted by the patient on 12/15/2022  What is the primary reason for  your visit?: Other  Please describe your symptoms.: Quick to anger, unable to stay with the current task, constantly interrupting conversations/cutting people off, fidgeting nonstop, cant remember/losing things, easily distracted  Have you had these symptoms before?: Yes  How long have you been having these symptoms?: Greater than 2 weeks  Please list any medications you are currently taking for this condition.: None  Please describe any probable cause for these symptoms. : Unknown

## 2023-01-03 ENCOUNTER — OFFICE VISIT (OUTPATIENT)
Dept: INTERNAL MEDICINE | Facility: CLINIC | Age: 48
End: 2023-01-03
Payer: COMMERCIAL

## 2023-01-03 VITALS
WEIGHT: 180.2 LBS | BODY MASS INDEX: 23.88 KG/M2 | DIASTOLIC BLOOD PRESSURE: 78 MMHG | HEART RATE: 80 BPM | HEIGHT: 73 IN | OXYGEN SATURATION: 97 % | SYSTOLIC BLOOD PRESSURE: 104 MMHG | RESPIRATION RATE: 18 BRPM | TEMPERATURE: 96.9 F

## 2023-01-03 DIAGNOSIS — F33.8 SEASONAL DEPRESSION: ICD-10-CM

## 2023-01-03 PROCEDURE — 99213 OFFICE O/P EST LOW 20 MIN: CPT | Performed by: PHYSICIAN ASSISTANT

## 2023-01-03 RX ORDER — FLUOXETINE HYDROCHLORIDE 20 MG/1
20 CAPSULE ORAL DAILY
Qty: 30 CAPSULE | Refills: 1 | Status: SHIPPED | OUTPATIENT
Start: 2023-01-03 | End: 2023-01-11

## 2023-01-03 NOTE — PROGRESS NOTES
[facility] PRIMARY [facility]ARE  [address]  [facility] [address]  Dept: [phone]  Dept Fax: [phone]  Loc: [phone]  Loc Fax: [phone]    Ronaldo Cantu  1975    Follow Up Office Visit Note    History of Present Illness:  Pt is a 46 y/o male in today for follow up for SAD. On prozac 10 mg qd. Taking as directed w/o any problems or side effects, overall depression doing better. Still having breakthrough sx, would like to increase dose. Pt looking at lights for phototherapy.      The following portions of the patient's history were reviewed and updated as appropriate: allergies, current medications, past family history, past medical history, past social history, past surgical history, and problem list.    Medications:    Current Outpatient Medications:   •  atorvastatin (LIPITOR) 20 MG tablet, TAKE 1 TABLET BY MOUTH EVERY DAY IN THE EVENING, Disp: 90 tablet, Rfl: 3  •  FLUoxetine (PROzac) 20 MG capsule, Take 1 capsule by mouth Daily., Disp: 30 capsule, Rfl: 1  •  tiZANidine (ZANAFLEX) 4 MG tablet, , Disp: , Rfl:     Subjective  No Known Allergies     Past Medical History:   Diagnosis Date   • Nephrolithiasis        Past Surgical History:   Procedure Laterality Date   • APPENDECTOMY  09/2010   • CARDIAC ABLATION  11/2019   • CERVICAL FUSION  10/2012   • KNEE ARTHROSCOPY     • SHOULDER ARTHROSCOPY         Family History   Problem Relation Age of Onset   • Heart attack Mother    • Cancer Maternal Grandmother    • Lung cancer Paternal Grandfather         Social History     Socioeconomic History   • Marital status:    Tobacco Use   • Smoking status: Never   • Smokeless tobacco: Never   Substance and Sexual Activity   • Alcohol use: Yes   • Drug use: Never       Review of Systems   Constitutional: Negative for activity change, chills, fatigue, fever and unexpected weight change.   HENT: Negative for congestion, ear pain, postnasal drip, sinus pressure and  sore throat.    Eyes: Negative for pain, discharge and redness.   Respiratory: Negative for cough, shortness of breath and wheezing.    Cardiovascular: Negative for chest pain, palpitations and leg swelling.   Gastrointestinal: Negative for diarrhea, nausea and vomiting.   Endocrine: Negative for cold intolerance and heat intolerance.   Genitourinary: Negative for decreased urine volume and dysuria.   Musculoskeletal: Negative for arthralgias and myalgias.   Skin: Negative for rash and wound.   Neurological: Negative for dizziness, light-headedness and headaches.   Hematological: Does not bruise/bleed easily.   Psychiatric/Behavioral: Positive for dysphoric mood. Negative for confusion, self-injury, sleep disturbance and suicidal ideas. The patient is not nervous/anxious.          Objective  Vitals:    01/03/23 1049   BP: 104/78   BP Location: Right arm   Patient Position: Sitting   Cuff Size: Large Adult   Pulse: 80   Resp: 18   Temp: 96.9 °F (36.1 °C)   TempSrc: Temporal   SpO2: 97%   Weight: 81.7 kg (180 lb 3.2 oz)   Height: 185.4 cm (72.99\")     Body mass index is 23.78 kg/m².     Physical Exam  Physical Exam  Vitals and nursing note reviewed.   Constitutional:       General: He is not in acute distress.     Appearance: He is not ill-appearing.   HENT:      Head: Normocephalic.      Right Ear: Tympanic membrane, ear canal and external ear normal. There is no impacted cerumen.      Left Ear: Tympanic membrane, ear canal and external ear normal. There is no impacted cerumen.      Nose: No congestion or rhinorrhea.      Mouth/Throat:      Mouth: Mucous membranes are moist.      Pharynx: Oropharynx is clear. No oropharyngeal exudate or posterior oropharyngeal erythema.   Eyes:      General:         Right eye: No discharge.         Left eye: No discharge.      Extraocular Movements: Extraocular movements intact.      Conjunctiva/sclera: Conjunctivae normal.      Pupils: Pupils are equal, round, and reactive to light.    Cardiovascular:      Rate and Rhythm: Normal rate and regular rhythm.      Heart sounds: Normal heart sounds. No murmur heard.    No friction rub. No gallop.   Pulmonary:      Effort: Pulmonary effort is normal. No respiratory distress.      Breath sounds: Normal breath sounds. No wheezing.   Abdominal:      General: Bowel sounds are normal. There is no distension.      Palpations: Abdomen is soft. There is no mass.      Tenderness: There is no abdominal tenderness.   Musculoskeletal:         General: No swelling. Normal range of motion.      Cervical back: Normal range of motion. No tenderness.      Right lower leg: No edema.      Left lower leg: No edema.   Lymphadenopathy:      Cervical: No cervical adenopathy.   Skin:     Findings: No bruising, erythema or rash.   Neurological:      Mental Status: He is oriented to person, place, and time.      Gait: Gait normal.   Psychiatric:         Mood and Affect: Mood normal.         Behavior: Behavior normal.         Thought Content: Thought content normal.         Judgment: Judgment normal.         Diagnostic Data  Procedures    Assessment  Diagnoses and all orders for this visit:    1. Seasonal depression (HCC)  -     FLUoxetine (PROzac) 20 MG capsule; Take 1 capsule by mouth Daily.  Dispense: 30 capsule; Refill: 1        Plan    1. Seasonal depression (HCC)- overall better, still having sx, increase dose of prozac to 20 mg qd. Cont to monitor.      Return in about 2 weeks (around 1/17/2023) for Recheck.    Gasper Waterman PA-C  01/03/2023

## 2023-01-11 ENCOUNTER — TELEPHONE (OUTPATIENT)
Dept: INTERNAL MEDICINE | Facility: CLINIC | Age: 48
End: 2023-01-11
Payer: COMMERCIAL

## 2023-01-11 DIAGNOSIS — F33.8 SEASONAL DEPRESSION: ICD-10-CM

## 2023-01-11 RX ORDER — FLUOXETINE HYDROCHLORIDE 20 MG/1
20 CAPSULE ORAL DAILY
Qty: 90 CAPSULE | Refills: 1 | Status: SHIPPED | OUTPATIENT
Start: 2023-01-11 | End: 2023-01-16 | Stop reason: SDUPTHER

## 2023-01-16 ENCOUNTER — OFFICE VISIT (OUTPATIENT)
Dept: INTERNAL MEDICINE | Facility: CLINIC | Age: 48
End: 2023-01-16
Payer: COMMERCIAL

## 2023-01-16 VITALS
TEMPERATURE: 96.4 F | SYSTOLIC BLOOD PRESSURE: 114 MMHG | HEART RATE: 82 BPM | BODY MASS INDEX: 24.25 KG/M2 | RESPIRATION RATE: 18 BRPM | DIASTOLIC BLOOD PRESSURE: 68 MMHG | HEIGHT: 73 IN | OXYGEN SATURATION: 98 % | WEIGHT: 183 LBS

## 2023-01-16 DIAGNOSIS — F33.8 SEASONAL DEPRESSION: Primary | ICD-10-CM

## 2023-01-16 DIAGNOSIS — R10.9 FLANK PAIN: ICD-10-CM

## 2023-01-16 LAB
BILIRUB BLD-MCNC: ABNORMAL MG/DL
CLARITY, POC: ABNORMAL
COLOR UR: ABNORMAL
EXPIRATION DATE: ABNORMAL
GLUCOSE UR STRIP-MCNC: NEGATIVE MG/DL
KETONES UR QL: NEGATIVE
LEUKOCYTE EST, POC: NEGATIVE
Lab: ABNORMAL
NITRITE UR-MCNC: NEGATIVE MG/ML
PH UR: 6 [PH] (ref 5–8)
PROT UR STRIP-MCNC: ABNORMAL MG/DL
RBC # UR STRIP: ABNORMAL /UL
SP GR UR: 1.02 (ref 1–1.03)
UROBILINOGEN UR QL: NORMAL

## 2023-01-16 PROCEDURE — 99213 OFFICE O/P EST LOW 20 MIN: CPT | Performed by: PHYSICIAN ASSISTANT

## 2023-01-16 PROCEDURE — 81003 URINALYSIS AUTO W/O SCOPE: CPT | Performed by: PHYSICIAN ASSISTANT

## 2023-01-16 RX ORDER — TAMSULOSIN HYDROCHLORIDE 0.4 MG/1
1 CAPSULE ORAL DAILY
Qty: 30 CAPSULE | Refills: 0 | Status: SHIPPED | OUTPATIENT
Start: 2023-01-16 | End: 2023-02-10

## 2023-01-16 RX ORDER — FLUOXETINE HYDROCHLORIDE 20 MG/1
20 CAPSULE ORAL DAILY
Qty: 90 CAPSULE | Refills: 1 | Status: SHIPPED | OUTPATIENT
Start: 2023-01-16 | End: 2023-03-30

## 2023-01-16 NOTE — PROGRESS NOTES
[facility] PRIMARY [facility]  [address]  [facility] [address]  Dept: [phone]  Dept Fax: [phone]  Loc: [phone]  Loc Fax: [phone]    Ronaldo Cantu  1975    Follow Up Office Visit Note    History of Present Illness:  Patient is a 47-year-old male in today for follow-up for depression.  Patient on Prozac 20 mg daily for seasonal affective disorder.  Reports good symptom control now.  Would like to stay on current dose.    Patient feels as if he has another kidney stone.  Having flank pain and some blood in his urine as well as groin pain.  Patient states he was given Flomax for this before which helped passed the stones.  Patient would like refill of Flomax.      The following portions of the patient's history were reviewed and updated as appropriate: allergies, current medications, past family history, past medical history, past social history, past surgical history, and problem list.    Medications:    Current Outpatient Medications:   •  atorvastatin (LIPITOR) 20 MG tablet, TAKE 1 TABLET BY MOUTH EVERY DAY IN THE EVENING, Disp: 90 tablet, Rfl: 3  •  FLUoxetine (PROzac) 20 MG capsule, Take 1 capsule by mouth Daily., Disp: 90 capsule, Rfl: 1  •  tiZANidine (ZANAFLEX) 4 MG tablet, , Disp: , Rfl:   •  tamsulosin (FLOMAX) 0.4 MG capsule 24 hr capsule, Take 1 capsule by mouth Daily., Disp: 30 capsule, Rfl: 0    Subjective  No Known Allergies     Past Medical History:   Diagnosis Date   • Nephrolithiasis        Past Surgical History:   Procedure Laterality Date   • APPENDECTOMY  09/2010   • CARDIAC ABLATION  11/2019   • CERVICAL FUSION  10/2012   • KNEE ARTHROSCOPY     • SHOULDER ARTHROSCOPY         Family History   Problem Relation Age of Onset   • Heart attack Mother    • Cancer Maternal Grandmother    • Lung cancer Paternal Grandfather         Social History     Socioeconomic History   • Marital status:    Tobacco Use   • Smoking status: Never  "  • Smokeless tobacco: Never   Substance and Sexual Activity   • Alcohol use: Yes   • Drug use: Never       Review of Systems   Constitutional: Negative for activity change, chills, fatigue, fever and unexpected weight change.   HENT: Negative for congestion, ear pain, postnasal drip, sinus pressure and sore throat.    Eyes: Negative for pain, discharge and redness.   Respiratory: Negative for cough, shortness of breath and wheezing.    Cardiovascular: Negative for chest pain, palpitations and leg swelling.   Gastrointestinal: Negative for diarrhea, nausea and vomiting.   Endocrine: Negative for cold intolerance and heat intolerance.   Genitourinary: Positive for dysuria, flank pain and hematuria. Negative for decreased urine volume.   Musculoskeletal: Negative for arthralgias and myalgias.   Skin: Negative for rash and wound.   Neurological: Negative for dizziness, light-headedness and headaches.   Hematological: Does not bruise/bleed easily.   Psychiatric/Behavioral: Negative for confusion, dysphoric mood and sleep disturbance. The patient is not nervous/anxious.          Objective  Vitals:    01/16/23 0813   BP: 114/68   BP Location: Left arm   Patient Position: Sitting   Cuff Size: Adult   Pulse: 82   Resp: 18   Temp: 96.4 °F (35.8 °C)   TempSrc: Temporal   SpO2: 98%   Weight: 83 kg (183 lb)   Height: 185.4 cm (72.99\")     Body mass index is 24.15 kg/m².     Physical Exam  Physical Exam  Vitals and nursing note reviewed.   Constitutional:       General: He is not in acute distress.     Appearance: He is not ill-appearing.   HENT:      Head: Normocephalic.      Right Ear: Tympanic membrane, ear canal and external ear normal. There is no impacted cerumen.      Left Ear: Tympanic membrane, ear canal and external ear normal. There is no impacted cerumen.      Nose: No congestion or rhinorrhea.      Mouth/Throat:      Mouth: Mucous membranes are moist.      Pharynx: Oropharynx is clear. No oropharyngeal exudate or " posterior oropharyngeal erythema.   Eyes:      General:         Right eye: No discharge.         Left eye: No discharge.      Extraocular Movements: Extraocular movements intact.      Conjunctiva/sclera: Conjunctivae normal.      Pupils: Pupils are equal, round, and reactive to light.   Cardiovascular:      Rate and Rhythm: Normal rate and regular rhythm.      Heart sounds: Normal heart sounds. No murmur heard.    No friction rub. No gallop.   Pulmonary:      Effort: Pulmonary effort is normal. No respiratory distress.      Breath sounds: Normal breath sounds. No wheezing.   Abdominal:      General: Bowel sounds are normal. There is no distension.      Palpations: Abdomen is soft. There is no mass.      Tenderness: There is no abdominal tenderness.   Musculoskeletal:         General: No swelling. Normal range of motion.      Cervical back: Normal range of motion. No tenderness.      Right lower leg: No edema.      Left lower leg: No edema.   Lymphadenopathy:      Cervical: No cervical adenopathy.   Skin:     Findings: No bruising, erythema or rash.   Neurological:      Mental Status: He is oriented to person, place, and time.      Gait: Gait normal.   Psychiatric:         Mood and Affect: Mood normal.         Behavior: Behavior normal.         Thought Content: Thought content normal.         Judgment: Judgment normal.         Diagnostic Data  Procedures    Assessment  Diagnoses and all orders for this visit:    1. Seasonal depression (HCC) (Primary)  -     FLUoxetine (PROzac) 20 MG capsule; Take 1 capsule by mouth Daily.  Dispense: 90 capsule; Refill: 1    2. Flank pain  -     CT Abdomen Pelvis Stone Protocol; Future    Other orders  -     tamsulosin (FLOMAX) 0.4 MG capsule 24 hr capsule; Take 1 capsule by mouth Daily.  Dispense: 30 capsule; Refill: 0        Plan    1. Seasonal depression (HCC) (Primary)- well controlled on Prozac 20 mg daily.  Keep same.  Continue to monitor.  Refilled med.    2. Flank pain- worse,  obtain UA and CT abdomen and pelvis stone protocol.  Sent in Flomax.      Return in about 6 weeks (around 2/27/2023) for Recheck.    Gasper Waterman PA-C  01/16/2023
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
## 2023-01-18 ENCOUNTER — TELEPHONE (OUTPATIENT)
Dept: INTERNAL MEDICINE | Facility: CLINIC | Age: 48
End: 2023-01-18
Payer: COMMERCIAL

## 2023-01-18 RX ORDER — HYDROCODONE BITARTRATE AND ACETAMINOPHEN 7.5; 325 MG/1; MG/1
1 TABLET ORAL EVERY 6 HOURS PRN
Qty: 30 TABLET | Refills: 0 | Status: SHIPPED | OUTPATIENT
Start: 2023-01-18

## 2023-01-18 NOTE — TELEPHONE ENCOUNTER
Caller: Ronaldo Cantu    Relationship: Self    Best call back number: [phone]    What medication are you requesting: SOMETHING FOR PAIN    What are your current symptoms: PAIN FROM KIDNEY STONE    How long have you been experiencing symptoms: SINCE SATURDAY    Have you had these symptoms before:    [x] Yes  [] No    Have you been treated for these symptoms before:   [x] Yes  [] No    If a prescription is needed, what is your preferred pharmacy and phone number:  [facility] [place] [phone]    Additional notes: PATIENT WAS SEEN BY SUMAYA QUIROZ ON Monday AND IS REQUESTING IF SOMETHING CAN BE CALLED INTO PHARMACY FOR PAIN FROM KIDNEY STONE.

## 2023-01-19 ENCOUNTER — TELEMEDICINE (OUTPATIENT)
Dept: PSYCHIATRY | Facility: CLINIC | Age: 48
End: 2023-01-19
Payer: COMMERCIAL

## 2023-01-19 DIAGNOSIS — F43.10 POST TRAUMATIC STRESS DISORDER (PTSD): ICD-10-CM

## 2023-01-19 DIAGNOSIS — Z79.899 MEDICATION MANAGEMENT: ICD-10-CM

## 2023-01-19 DIAGNOSIS — F31.60 BIPOLAR AFFECTIVE DISORDER, MIXED: Primary | ICD-10-CM

## 2023-01-19 PROCEDURE — 90792 PSYCH DIAG EVAL W/MED SRVCS: CPT

## 2023-01-19 RX ORDER — LAMOTRIGINE 25 MG/1
TABLET ORAL
Qty: 42 TABLET | Refills: 0 | Status: SHIPPED | OUTPATIENT
Start: 2023-01-19 | End: 2023-02-15 | Stop reason: SDUPTHER

## 2023-01-19 RX ORDER — FLUOXETINE 10 MG/1
CAPSULE ORAL
COMMUNITY
Start: 2023-01-16 | End: 2023-01-19 | Stop reason: ALTCHOICE

## 2023-02-01 ENCOUNTER — LAB (OUTPATIENT)
Dept: LAB | Facility: HOSPITAL | Age: 48
End: 2023-02-01
Payer: COMMERCIAL

## 2023-02-01 DIAGNOSIS — Z79.899 MEDICATION MANAGEMENT: Primary | ICD-10-CM

## 2023-02-01 PROCEDURE — 82306 VITAMIN D 25 HYDROXY: CPT

## 2023-02-01 PROCEDURE — 36415 COLL VENOUS BLD VENIPUNCTURE: CPT

## 2023-02-02 LAB — 25(OH)D3 SERPL-MCNC: 42.6 NG/ML (ref 30–100)

## 2023-02-10 DIAGNOSIS — F31.60 BIPOLAR AFFECTIVE DISORDER, MIXED: ICD-10-CM

## 2023-02-10 RX ORDER — TAMSULOSIN HYDROCHLORIDE 0.4 MG/1
CAPSULE ORAL
Qty: 30 CAPSULE | Refills: 0 | Status: SHIPPED | OUTPATIENT
Start: 2023-02-10 | End: 2023-03-07 | Stop reason: SDUPTHER

## 2023-02-12 RX ORDER — LAMOTRIGINE 25 MG/1
TABLET ORAL
Qty: 42 TABLET | Refills: 0 | OUTPATIENT
Start: 2023-02-12

## 2023-02-13 NOTE — PROGRESS NOTES
"This provider is located at the [facility] (through [facility]), [address], [facility], [address] using a secure video visit through [employer]. Patient is being seen remotely via telehealth at their home address in [place], and stated they are in a secure environment for this session.   The patient's condition being consulted/diagnosed/treated is appropriate for telemedicine. The provider identified herself as well as her credentials.   The patient, and/or patients guardian, consent to be seen remotely, and when consent is given they understand that the consent allows for patient identifiable information to be sent to a third party as needed. They may refuse to be seen remotely at any time. The electronic data is encrypted and password protected, and the patient and/or guardian has been advised of the potential risks to privacy not withstanding such measures.   The use of a video visit has been reviewed with the patient and verbal informed consent has been obtained.   Patient identifiers used: Name and .    Subjective   Ronaldo Cantu is a 47 y.o. male who presents today for follow up    Chief Complaint:    Chief Complaint   Patient presents with   • Depression   • PTSD   • Med Management        History of Present Illness:   History of Present Illness  Patient is a 47-year-old male presenting for 1 month follow-up related to depression, anxiety, and irritability.  Lamictal I added during last visit, and patient has noticed efficacy with improvement in depression and anxiety.  He states \"my in-laws have seen a huge difference in my demeanor.\"  He states his wife has noticed less irritability as well.  Patient not as irritable upon awakening in the mornings.  Patient states \"to an extent I feel like I had OCD, little rules I had to go by,\" and voices the circumstances are also better with use of Prozac.  His largest concern today is he continues to be restless, \"I am " "always fidgeting, always tapping.\"  Patient cites improvement in motivational factors, \"I am getting back to the gym which I have not done since I have been .\"  Denies sleep disturbance, now sleeping approximately 7 to 8 hours nightly.  Denies appetite disturbance. He also has noticed less brain fog, more easily finds items lost.  PHQ screening performed at this time and reduced from 11-1, indicating minimal depression.  JACQUELINE screening performed as well and reduced from 16-4, indicating minimal anxiety as well.  Recent Aleksandar had labs performed and within normal limits.  He denies SI, HI, SIB, or hallucinations.  Denies rash or other side effects with use of Lamictal or Prozac.  Denies medical status changes, medicinal changes, or situational changes since previous visit.    Patient resides in a home with his wife of 7 years whom he cites as supportive.  He has 1 biological child and 2 stepchildren, all adults.  He has a college degree in biology and works full-time at [employer] as a , thoroughly enjoys his job.  M[employer]on Rastafarian preference.  Enjoys golfing.  Denies drug or alcohol use.       The following portions of the patient's history were reviewed and updated as appropriate: allergies, current medications, past family history, past medical history, past social history, past surgical history and problem list.    Past Psychiatric History:  Began Treatment:7 years ago for anger management  Diagnoses:unsure  Psychiatrist:Denies  Therapist:previously  Admission History:Denies  Medication Trials:effexor  Self Harm: Denies  Suicide Attempts:Denies   Psychosis, Anxiety, Depression: N/A    Past Medical History:  Past Medical History:   Diagnosis Date   • Nephrolithiasis        Substance Abuse History:   Types:Denies all, including illicit  Withdrawal Symptoms:Denies  Longest Period Sober:Not Applicable   AA: Not applicable     Social History:  Social History     Socioeconomic History   • Marital " status:    • Number of children: 3   Tobacco Use   • Smoking status: Never   • Smokeless tobacco: Never   Substance and Sexual Activity   • Alcohol use: Yes     Alcohol/week: 1.0 standard drink     Types: 1 Cans of beer per week     Comment: 1 per day   • Drug use: Never       Family History:  Family History   Problem Relation Age of Onset   • Heart attack Mother    • Suicide Attempts Sister    • Cancer Maternal Grandmother    • Lung cancer Paternal Grandfather        Past Surgical History:  Past Surgical History:   Procedure Laterality Date   • APPENDECTOMY  09/2010   • CARDIAC ABLATION  11/2019   • CERVICAL FUSION  10/2012   • KNEE ARTHROPLASTY, PARTIAL REPLACEMENT  12/2022   • KNEE ARTHROSCOPY  05/2021   • SHOULDER ARTHROSCOPY         Problem List:  Patient Active Problem List   Diagnosis   • Chronic atrial fibrillation (HCC)   • Other hyperlipidemia   • Primary osteoarthritis of both knees   • Degeneration of lumbar or lumbosacral intervertebral disc   • Toenail fungus       Allergy:   No Known Allergies     Current Medications:   Current Outpatient Medications   Medication Sig Dispense Refill   • lamoTRIgine (LaMICtal) 25 MG tablet Take 2 tabs by mouth daily 42 tablet 0   • atorvastatin (LIPITOR) 20 MG tablet TAKE 1 TABLET BY MOUTH EVERY DAY IN THE EVENING 90 tablet 3   • FLUoxetine (PROzac) 20 MG capsule Take 1 capsule by mouth Daily. (Patient taking differently: Take 40 mg by mouth Daily.) 90 capsule 1   • HYDROcodone-acetaminophen (Norco) 7.5-325 MG per tablet Take 1 tablet by mouth Every 6 (Six) Hours As Needed (stone pain, DO NOT DRIVE ON MEDS). 30 tablet 0   • tamsulosin (FLOMAX) 0.4 MG capsule 24 hr capsule TAKE 1 CAPSULE BY MOUTH EVERY DAY 30 capsule 0   • tiZANidine (ZANAFLEX) 4 MG tablet        No current facility-administered medications for this visit.       Review of Systems:    Review of Systems   Constitutional: Positive for activity change.   HENT: Negative.    Eyes: Negative.     Respiratory: Negative.    Cardiovascular: Negative.    Gastrointestinal: Negative.    Endocrine: Negative.    Genitourinary: Negative.         Kidney stone   Musculoskeletal: Positive for back pain.   Skin: Negative.    Neurological: Negative.  Negative for seizures.   Hematological: Negative.    Psychiatric/Behavioral: Positive for decreased concentration, positive for hyperactivity and stress.         Physical Exam:   Physical Exam  Constitutional:       Appearance: Normal appearance. He is normal weight.   HENT:      Head: Normocephalic.      Nose: Nose normal.   Pulmonary:      Effort: Pulmonary effort is normal.   Musculoskeletal:         General: Normal range of motion.      Cervical back: Normal range of motion.   Neurological:      General: No focal deficit present.      Mental Status: He is alert and oriented to person, place, and time. Mental status is at baseline.   Psychiatric:         Attention and Perception: Perception normal. He is inattentive.         Mood and Affect: Mood is anxious. Affect is tearful.         Speech: Speech normal.         Behavior: Behavior normal. Behavior is cooperative.         Thought Content: Thought content normal.         Cognition and Memory: Cognition and memory normal.         Judgment: Judgment is impulsive.     Vitals:  There were no vitals taken for this visit. There is no height or weight on file to calculate BMI.  Due to extenuating circumstances and possible current health risks associated with the patient being present in a clinical setting (with current health restrictions in place in regards to possible COVID 19 transmission/exposure), the patient was seen remotely today via a MyChart Video Visit through [facility].  Unable to obtain vital signs due to nature of remote visit.  Height stated at 6ft1 inches.  Weight stated at 183 pounds.    Last 3 Blood Pressure Readings:  BP Readings from Last 3 Encounters:   01/16/23 114/68   01/03/23 104/78   12/19/22 104/70        PHQ-9 Score:   PHQ-9 Total Score:   PHQ-9 Depression Screening  Little interest or pleasure in doing things? (P) 0-->not at all   Feeling down, depressed, or hopeless? (P) 0-->not at all   Trouble falling or staying asleep, or sleeping too much? (P) 0-->not at all   Feeling tired or having little energy? (P) 0-->not at all   Poor appetite or overeating? (P) 0-->not at all   Feeling bad about yourself - or that you are a failure or have let yourself or your family down? (P) 0-->not at all   Trouble concentrating on things, such as reading the newspaper or watching television? (P) 1-->several days   Moving or speaking so slowly that other people could have noticed? Or the opposite - being so fidgety or restless that you have been moving around a lot more than usual? (P) 0-->not at all   Thoughts that you would be better off dead, or of hurting yourself in some way? (P) 0-->not at all   PHQ-9 Total Score (P) 1   If you checked off any problems, how difficult have these problems made it for you to do your work, take care of things at home, or get along with other people? (P) not difficult at all       JACQUELINE-7 Score:   Feeling nervous, anxious or on edge: (P) Several days  Not being able to stop or control worrying: (P) Not at all  Worrying too much about different things: (P) Several days  Trouble Relaxing: (P) Not at all  Being so restless that it is hard to sit still: (P) Several days  Feeling afraid as if something awful might happen: (P) Not at all  Becoming easily annoyed or irritable: (P) Several days  JACQUELINE 7 Total Score: (P) 4  If you checked any problems, how difficult have these problems made it for you to do your work, take care of things at home, or get along with other people: (P) Not difficult at all     Mental Status Exam:   Hygiene:   good  Cooperation:  Cooperative  Eye Contact:  Good  Psychomotor Behavior:  Restless  Affect:  Appropriate  Mood: anxious  Hopelessness: Denies  Speech:  Normal  Thought  Process:  Goal directed  Thought Content:  Normal  Suicidal:  None  Homicidal:  None  Hallucinations:  None  Delusion:  None  Memory:  Intact  Orientation:  Grossly intact  Reliability:  good  Insight:  Good  Judgement:  Fair  Impulse Control:  Fair  Physical/Medical Issues:  back surgeries, knee surgeries, cardiac ablation 2019       Lab Results:   Lab on 02/01/2023   Component Date Value Ref Range Status   • 25 Hydroxy, Vitamin D 02/01/2023 42.6  30.0 - 100.0 ng/ml Final   Office Visit on 01/16/2023   Component Date Value Ref Range Status   • Color 01/16/2023 Dark Yellow  Yellow, Straw, Dark Yellow, Tali Final   • Clarity, UA 01/16/2023 Slightly Cloudy (A)  Clear Final   • Specific Gravity  01/16/2023 1.025  1.005 - 1.030 Final   • pH, Urine 01/16/2023 6.0  5.0 - 8.0 Final   • Leukocytes 01/16/2023 Negative  Negative Final   • Nitrite, UA 01/16/2023 Negative  Negative Final   • Protein, POC 01/16/2023 Trace (A)  Negative mg/dL Final    15 mg/dL   • Glucose, UA 01/16/2023 Negative  Negative mg/dL Final   • Ketones, UA 01/16/2023 Negative  Negative Final   • Urobilinogen, UA 01/16/2023 Normal  Normal, 0.2 E.U./dL Final    0.2 mg/dL   • Bilirubin 01/16/2023 1 mg/dL (A)  Negative Final    1 mg/dL   • Blood, UA 01/16/2023 3+ (A)  Negative Final    200 Gordon/uL   • Lot Number 01/16/2023 [id number]   Final   • Expiration Date 01/16/2023 3/25/2024   Final   Lab on 10/18/2022   Component Date Value Ref Range Status   • WBC 10/18/2022 6.06  3.40 - 10.80 10*3/mm3 Final   • RBC 10/18/2022 5.02  4.14 - 5.80 10*6/mm3 Final   • Hemoglobin 10/18/2022 14.9  13.0 - 17.7 g/dL Final   • Hematocrit 10/18/2022 44.3  37.5 - 51.0 % Final   • MCV 10/18/2022 88.2  79.0 - 97.0 fL Final   • MCH 10/18/2022 29.7  26.6 - 33.0 pg Final   • MCHC 10/18/2022 33.6  31.5 - 35.7 g/dL Final   • RDW 10/18/2022 13.1  12.3 - 15.4 % Final   • RDW-SD 10/18/2022 42.3  37.0 - 54.0 fl Final   • MPV 10/18/2022 10.9  6.0 - 12.0 fL Final   • Platelets  10/18/2022 167  140 - 450 10*3/mm3 Final   • Glucose 10/18/2022 83  65 - 99 mg/dL Final   • BUN 10/18/2022 13  6 - 20 mg/dL Final   • Creatinine 10/18/2022 1.12  0.76 - 1.27 mg/dL Final   • Sodium 10/18/2022 140  136 - 145 mmol/L Final   • Potassium 10/18/2022 4.1  3.5 - 5.2 mmol/L Final   • Chloride 10/18/2022 104  98 - 107 mmol/L Final   • CO2 10/18/2022 27.2  22.0 - 29.0 mmol/L Final   • Calcium 10/18/2022 9.6  8.6 - 10.5 mg/dL Final   • Total Protein 10/18/2022 6.8  6.0 - 8.5 g/dL Final   • Albumin 10/18/2022 4.20  3.50 - 5.20 g/dL Final   • ALT (SGPT) 10/18/2022 34  1 - 41 U/L Final   • AST (SGOT) 10/18/2022 27  1 - 40 U/L Final   • Alkaline Phosphatase 10/18/2022 87  39 - 117 U/L Final   • Total Bilirubin 10/18/2022 0.6  0.0 - 1.2 mg/dL Final   • Globulin 10/18/2022 2.6  gm/dL Final   • A/G Ratio 10/18/2022 1.6  g/dL Final   • BUN/Creatinine Ratio 10/18/2022 11.6  7.0 - 25.0 Final   • Anion Gap 10/18/2022 8.8  5.0 - 15.0 mmol/L Final   • eGFR 10/18/2022 81.0  >60.0 mL/min/1.73 Final    National Kidney Foundation and American Society of Nephrology (ASN) Task Force recommended calculation based on the Chronic Kidney Disease Epidemiology Collaboration (CKD-EPI) equation refit without adjustment for race.   • Total Cholesterol 10/18/2022 138  0 - 200 mg/dL Final   • Triglycerides 10/18/2022 74  0 - 150 mg/dL Final   • HDL Cholesterol 10/18/2022 50  40 - 60 mg/dL Final   • LDL Cholesterol  10/18/2022 73  0 - 100 mg/dL Final   • VLDL Cholesterol 10/18/2022 15  5 - 40 mg/dL Final   • LDL/HDL Ratio 10/18/2022 1.46   Final   • PSA 10/18/2022 0.796  0.000 - 4.000 ng/mL Final   • TSH 10/18/2022 2.520  0.270 - 4.200 uIU/mL Final   • Vitamin B-12 10/18/2022 379  211 - 946 pg/mL Final         Assessment & Plan   Problems Addressed this Visit    None  Visit Diagnoses     Seasonal depression (HCC)    -  Primary    Bipolar affective disorder, mixed (HCC)        Relevant Medications    lamoTRIgine (LaMICtal) 25 MG tablet     Medication management          Diagnoses       Codes Comments    Seasonal depression (HCC)    -  Primary ICD-10-CM: F33.8  ICD-9-CM: 296.99     Bipolar affective disorder, mixed (HCC)     ICD-10-CM: F31.60  ICD-9-CM: 296.60     Medication management     ICD-10-CM: Z79.899  ICD-9-CM: V58.69           Visit Diagnoses:    ICD-10-CM ICD-9-CM   1. Seasonal depression (HCC)  F33.8 296.99   2. Bipolar affective disorder, mixed (HCC)  F31.60 296.60   3. Medication management  Z79.899 V58.69     Prozac increased to 40 mg due to patient continuing to voice anxiety and restlessness.  Lamictal refilled.  Side effects previously educated upon, patient verbalized understanding and denies at this time.  Recent labs reviewed with patient.  Follow up in 4 weeks or sooner if warranted.    GOALS:  Short Term Goals: Patient will be compliant with medication, and patient will have no significant medication related side effects.  Patient will be engaged in psychotherapy as indicated.  Patient will report subjective improvement of symptoms.  Long term goals: To stabilize mood and treat/improve subjective symptoms, the patient will stay out of the hospital, the patient will be at an optimal level of functioning, and the patient will take all medications as prescribed.  The patient/guardian verbalized understanding and agreement with goals that were mutually set.      TREATMENT PLAN: Continue supportive psychotherapy efforts and medications as indicated.  Pharmacological and Non-Pharmacological treatment options discussed during today's visit. Patient/Guardian acknowledged and verbally consented with current treatment plan and was educated on the importance of compliance with treatment and follow-up appointments.      MEDICATION ISSUES:  Discussed medication options and treatment plan of prescribed medication as well as the risks, benefits, any black box warnings, and side effects including potential falls, possible impaired driving, and  metabolic adversities among others. Patient is agreeable to call the office with any worsening of symptoms or onset of side effects, or if any concerns or questions arise.  The contact information for the office is made available to the patient. Patient is agreeable to call 911 or go to the nearest ER should they begin having any SI/HI, or if any urgent concerns arise. No medication side effects or related complaints today.     MEDS ORDERED DURING VISIT:  New Medications Ordered This Visit   Medications   • lamoTRIgine (LaMICtal) 25 MG tablet     Sig: Take 2 tabs by mouth daily     Dispense:  42 tablet     Refill:  0       Follow Up Appointment:   Return in about 4 weeks (around 3/15/2023) for Recheck.             This document has been electronically signed by DEVEN Murillo  February 15, 2023 13:27 EST    Some of the data in this electronic note has been brought forward from a previous encounter, any necessary changes have been made, it has been reviewed by this APRN, and it is accurate.    Dictated Utilizing Dragon Dictation: Part of this note may be an electronic transcription/translation of spoken language to printed text using the Dragon Dictation System.

## 2023-02-15 ENCOUNTER — TELEMEDICINE (OUTPATIENT)
Dept: PSYCHIATRY | Facility: CLINIC | Age: 48
End: 2023-02-15
Payer: COMMERCIAL

## 2023-02-15 DIAGNOSIS — Z79.899 MEDICATION MANAGEMENT: ICD-10-CM

## 2023-02-15 DIAGNOSIS — F31.60 BIPOLAR AFFECTIVE DISORDER, MIXED: ICD-10-CM

## 2023-02-15 DIAGNOSIS — F33.8 SEASONAL DEPRESSION: Primary | ICD-10-CM

## 2023-02-15 PROCEDURE — 99214 OFFICE O/P EST MOD 30 MIN: CPT

## 2023-02-15 RX ORDER — LAMOTRIGINE 25 MG/1
TABLET ORAL
Qty: 42 TABLET | Refills: 0 | Status: SHIPPED | OUTPATIENT
Start: 2023-02-15 | End: 2023-03-06

## 2023-03-06 DIAGNOSIS — F31.60 BIPOLAR AFFECTIVE DISORDER, MIXED: ICD-10-CM

## 2023-03-06 RX ORDER — LAMOTRIGINE 25 MG/1
TABLET ORAL
Qty: 42 TABLET | Refills: 0 | Status: SHIPPED | OUTPATIENT
Start: 2023-03-06 | End: 2023-03-08 | Stop reason: SDUPTHER

## 2023-03-07 ENCOUNTER — TELEPHONE (OUTPATIENT)
Dept: INTERNAL MEDICINE | Facility: CLINIC | Age: 48
End: 2023-03-07
Payer: COMMERCIAL

## 2023-03-07 RX ORDER — TAMSULOSIN HYDROCHLORIDE 0.4 MG/1
1 CAPSULE ORAL DAILY
Qty: 90 CAPSULE | Refills: 1 | Status: SHIPPED | OUTPATIENT
Start: 2023-03-07

## 2023-03-08 ENCOUNTER — TELEPHONE (OUTPATIENT)
Dept: PSYCHIATRY | Facility: CLINIC | Age: 48
End: 2023-03-08
Payer: COMMERCIAL

## 2023-03-08 DIAGNOSIS — F31.60 BIPOLAR AFFECTIVE DISORDER, MIXED: ICD-10-CM

## 2023-03-08 RX ORDER — LAMOTRIGINE 25 MG/1
TABLET ORAL
Qty: 60 TABLET | Refills: 2 | Status: SHIPPED | OUTPATIENT
Start: 2023-03-08

## 2023-03-08 NOTE — TELEPHONE ENCOUNTER
Patient called said his script for Lamictal was written for 21 day supply and its very expensive for him to purchase that way as insurance will only pay for 90 day supply. Would you be able to re-write it for 90 day supply and cancel previous order?       Please Advise?        Thank You

## 2023-03-15 ENCOUNTER — TELEMEDICINE (OUTPATIENT)
Dept: PSYCHIATRY | Facility: CLINIC | Age: 48
End: 2023-03-15
Payer: COMMERCIAL

## 2023-03-15 DIAGNOSIS — F33.8 SEASONAL DEPRESSION: ICD-10-CM

## 2023-03-15 DIAGNOSIS — R41.840 ATTENTION OR CONCENTRATION DEFICIT: ICD-10-CM

## 2023-03-15 DIAGNOSIS — Z79.899 MEDICATION MANAGEMENT: ICD-10-CM

## 2023-03-15 DIAGNOSIS — F31.60 BIPOLAR AFFECTIVE DISORDER, MIXED: Primary | ICD-10-CM

## 2023-03-15 PROCEDURE — 99214 OFFICE O/P EST MOD 30 MIN: CPT

## 2023-03-15 RX ORDER — ATOMOXETINE 40 MG/1
40 CAPSULE ORAL DAILY
Qty: 30 CAPSULE | Refills: 0 | Status: SHIPPED | OUTPATIENT
Start: 2023-03-15

## 2023-03-15 NOTE — PROGRESS NOTES
"This provider is located at the [facility] (formerly [facility]) (through [facility]), [address], [place], [address] using a secure video visit through [employer]. Patient is being seen remotely via telehealth at their home address in [place], and stated they are in a secure environment for this session.   The patient's condition being consulted/diagnosed/treated is appropriate for telemedicine. The provider identified herself as well as her credentials.   The patient, and/or patients guardian, consent to be seen remotely, and when consent is given they understand that the consent allows for patient identifiable information to be sent to a third party as needed. They may refuse to be seen remotely at any time. The electronic data is encrypted and password protected, and the patient and/or guardian has been advised of the potential risks to privacy not withstanding such measures.   The use of a video visit has been reviewed with the patient and verbal informed consent has been obtained.   Patient identifiers used: Name and .    Subjective   Ronaldo Cantu is a 47 y.o. male who presents today for follow up    Chief Complaint:    Chief Complaint   Patient presents with   • Anxiety   • Depression   • Med Management   • concentration deficits        History of Present Illness:   History of Present Illness  Patient is a 47-year-old male presenting for 1 month follow-up related to depression, anxiety, and irritability.  Patient states he is doing well with current medication regimen, \"it has been great.  My mood has been fantastic.\"  Patient also states he recently began going to the gym.  Regarding anxiety, he states \"I think logically, I do not freak out in the scenario.\"  He sleeps approximately 8 hours nightly, denies sleep disturbance.  Feels like he is over eating although he states his wife will state he is not, has had recent weight loss.  Patient voices concerns today regarding \"fidgeting " "and concentration issues.  I am constantly tapping my feet and clicking.  My mind wanders a lot.  I cannot focus on one happening in the moment because my mind goes somewhere else.\"  A SRS performed at this time, some concentration deficits noted.  He noted some improvement in restlessness with recent increase to Prozac however has not noticed improvement in concentration with this.  Been irritability, \"I do not notice irritability at all.\"  Patient is visibly restless in conversation today as he has been in previous conversations.  He denies SI, HI, SIB, or hallucinations currently and is convincing.  Denies side effects with use of these medications.  PHQ score increased from 1-4, continuing to indicate minimal depression.  JACQUELINE scoring reduced from 4-1, continuing to indicate minimal anxiety.    Patient resides in a home with his wife of 7 years whom he cites as supportive.  He has 1 biological child and 2 stepchildren, all adults.  He has a college degree in biology and works full-time at [employer] as a , thoroughly enjoys his job. He states he is going back to work next week and looking forward to it except will miss spending time with his grandchild. Adventism Sikh preference.  Enjoys golfing.  Denies drug or alcohol use.       The following portions of the patient's history were reviewed and updated as appropriate: allergies, current medications, past family history, past medical history, past social history, past surgical history and problem list.    Past Psychiatric History:  Began Treatment:7 years ago for anger management  Diagnoses:unsure  Psychiatrist:Denies  Therapist:previously  Admission History:Denies  Medication Trials:effexor  Self Harm: Denies  Suicide Attempts:Denies   Psychosis, Anxiety, Depression: N/A    Past Medical History:  Past Medical History:   Diagnosis Date   • Nephrolithiasis        Substance Abuse History:   Types:Denies all, including illicit  Withdrawal " Symptoms:Denies  Longest Period Sober:Not Applicable   AA: Not applicable     Social History:  Social History     Socioeconomic History   • Marital status:    • Number of children: 3   Tobacco Use   • Smoking status: Never   • Smokeless tobacco: Never   Substance and Sexual Activity   • Alcohol use: Yes     Alcohol/week: 1.0 standard drink     Types: 1 Cans of beer per week     Comment: 1 per day   • Drug use: Never       Family History:  Family History   Problem Relation Age of Onset   • Heart attack Mother    • Suicide Attempts Sister    • Cancer Maternal Grandmother    • Lung cancer Paternal Grandfather        Past Surgical History:  Past Surgical History:   Procedure Laterality Date   • APPENDECTOMY  09/2010   • CARDIAC ABLATION  11/2019   • CERVICAL FUSION  10/2012   • KNEE ARTHROPLASTY, PARTIAL REPLACEMENT  12/2022   • KNEE ARTHROSCOPY  05/2021   • SHOULDER ARTHROSCOPY         Problem List:  Patient Active Problem List   Diagnosis   • Chronic atrial fibrillation (HCC)   • Other hyperlipidemia   • Primary osteoarthritis of both knees   • Degeneration of lumbar or lumbosacral intervertebral disc   • Toenail fungus       Allergy:   No Known Allergies     Current Medications:   Current Outpatient Medications   Medication Sig Dispense Refill   • atomoxetine (Strattera) 40 MG capsule Take 1 capsule by mouth Daily. 30 capsule 0   • atorvastatin (LIPITOR) 20 MG tablet TAKE 1 TABLET BY MOUTH EVERY DAY IN THE EVENING 90 tablet 3   • FLUoxetine (PROzac) 20 MG capsule Take 1 capsule by mouth Daily. (Patient taking differently: Take 2 capsules by mouth Daily.) 90 capsule 1   • HYDROcodone-acetaminophen (Norco) 7.5-325 MG per tablet Take 1 tablet by mouth Every 6 (Six) Hours As Needed (stone pain, DO NOT DRIVE ON MEDS). 30 tablet 0   • lamoTRIgine (LaMICtal) 25 MG tablet TAKE 2 TABLETS BY MOUTH EVERY DAY 60 tablet 2   • tamsulosin (FLOMAX) 0.4 MG capsule 24 hr capsule Take 1 capsule by mouth Daily. 90 capsule 1   •  tiZANidine (ZANAFLEX) 4 MG tablet        No current facility-administered medications for this visit.       Review of Systems:    Review of Systems   Constitutional: Negative.    HENT: Negative.    Eyes: Negative.    Respiratory: Negative.    Cardiovascular: Negative.    Gastrointestinal: Negative.    Endocrine: Negative.    Genitourinary: Negative.         Kidney stone   Musculoskeletal: Positive for back pain.   Skin: Negative.    Neurological: Negative.  Negative for seizures.   Hematological: Negative.    Psychiatric/Behavioral: Positive for decreased concentration and positive for hyperactivity.         Physical Exam:   Physical Exam  Constitutional:       Appearance: Normal appearance. He is normal weight.   HENT:      Head: Normocephalic.      Nose: Nose normal.   Pulmonary:      Effort: Pulmonary effort is normal.   Musculoskeletal:         General: Normal range of motion.      Cervical back: Normal range of motion.   Neurological:      General: No focal deficit present.      Mental Status: He is alert and oriented to person, place, and time. Mental status is at baseline.   Psychiatric:         Attention and Perception: Perception normal. He is inattentive.         Mood and Affect: Affect normal. Mood is anxious.         Speech: Speech is rapid and pressured.         Behavior: Behavior normal. Behavior is cooperative.         Thought Content: Thought content normal.         Cognition and Memory: Cognition and memory normal.         Judgment: Judgment normal.     Vitals:  There were no vitals taken for this visit. There is no height or weight on file to calculate BMI.  Due to extenuating circumstances and possible current health risks associated with the patient being present in a clinical setting (with current health restrictions in place in regards to possible COVID 19 transmission/exposure), the patient was seen remotely today via a MyChart Video Visit through [employer].  Unable to obtain vital signs due to  nature of remote visit.  Height stated at 6ft1 inches.  Weight stated at 183 pounds.    Last 3 Blood Pressure Readings:  BP Readings from Last 3 Encounters:   01/16/23 114/68   01/03/23 104/78   12/19/22 104/70       PHQ-9 Score:   PHQ-9 Total Score:   PHQ-9 Depression Screening  Little interest or pleasure in doing things? (P) 0-->not at all   Feeling down, depressed, or hopeless? (P) 0-->not at all   Trouble falling or staying asleep, or sleeping too much? (P) 0-->not at all   Feeling tired or having little energy? (P) 0-->not at all   Poor appetite or overeating? (P) 0-->not at all   Feeling bad about yourself - or that you are a failure or have let yourself or your family down? (P) 0-->not at all   Trouble concentrating on things, such as reading the newspaper or watching television? (P) 1-->several days   Moving or speaking so slowly that other people could have noticed? Or the opposite - being so fidgety or restless that you have been moving around a lot more than usual? (P) 3-->nearly every day   Thoughts that you would be better off dead, or of hurting yourself in some way? (P) 0-->not at all   PHQ-9 Total Score (P) 4   If you checked off any problems, how difficult have these problems made it for you to do your work, take care of things at home, or get along with other people? (P) not difficult at all       JACQUELINE-7 Score:   Feeling nervous, anxious or on edge: (P) Not at all  Not being able to stop or control worrying: (P) Not at all  Worrying too much about different things: (P) Not at all  Trouble Relaxing: (P) Not at all  Being so restless that it is hard to sit still: (P) Several days  Feeling afraid as if something awful might happen: (P) Not at all  Becoming easily annoyed or irritable: (P) Not at all  JACQUELINE 7 Total Score: (P) 1  If you checked any problems, how difficult have these problems made it for you to do your work, take care of things at home, or get along with other people: (P) Not difficult at  all     Mental Status Exam:   Hygiene:   good  Cooperation:  Cooperative  Eye Contact:  Good  Psychomotor Behavior:  Restless  Affect:  Appropriate  Mood: anxious  Hopelessness: Denies  Speech:  Normal  Thought Process:  Goal directed  Thought Content:  Normal  Suicidal:  None  Homicidal:  None  Hallucinations:  None  Delusion:  None  Memory:  Intact  Orientation:  Grossly intact  Reliability:  good  Insight:  Good  Judgement:  Fair  Impulse Control:  Fair  Physical/Medical Issues:  back surgeries, knee surgeries, cardiac ablation 2019       Lab Results:   Lab on 02/01/2023   Component Date Value Ref Range Status   • 25 Hydroxy, Vitamin D 02/01/2023 42.6  30.0 - 100.0 ng/ml Final   Office Visit on 01/16/2023   Component Date Value Ref Range Status   • Color 01/16/2023 Dark Yellow  Yellow, Straw, Dark Yellow, Tali Final   • Clarity, UA 01/16/2023 Slightly Cloudy (A)  Clear Final   • Specific Gravity  01/16/2023 1.025  1.005 - 1.030 Final   • pH, Urine 01/16/2023 6.0  5.0 - 8.0 Final   • Leukocytes 01/16/2023 Negative  Negative Final   • Nitrite, UA 01/16/2023 Negative  Negative Final   • Protein, POC 01/16/2023 Trace (A)  Negative mg/dL Final    15 mg/dL   • Glucose, UA 01/16/2023 Negative  Negative mg/dL Final   • Ketones, UA 01/16/2023 Negative  Negative Final   • Urobilinogen, UA 01/16/2023 Normal  Normal, 0.2 E.U./dL Final    0.2 mg/dL   • Bilirubin 01/16/2023 1 mg/dL (A)  Negative Final    1 mg/dL   • Blood, UA 01/16/2023 3+ (A)  Negative Final    200 Gordon/uL   • Lot Number 01/16/2023 [id number]   Final   • Expiration Date 01/16/2023 3/25/2024   Final   Lab on 10/18/2022   Component Date Value Ref Range Status   • WBC 10/18/2022 6.06  3.40 - 10.80 10*3/mm3 Final   • RBC 10/18/2022 5.02  4.14 - 5.80 10*6/mm3 Final   • Hemoglobin 10/18/2022 14.9  13.0 - 17.7 g/dL Final   • Hematocrit 10/18/2022 44.3  37.5 - 51.0 % Final   • MCV 10/18/2022 88.2  79.0 - 97.0 fL Final   • MCH 10/18/2022 29.7  26.6 - 33.0 pg Final    • MCHC 10/18/2022 33.6  31.5 - 35.7 g/dL Final   • RDW 10/18/2022 13.1  12.3 - 15.4 % Final   • RDW-SD 10/18/2022 42.3  37.0 - 54.0 fl Final   • MPV 10/18/2022 10.9  6.0 - 12.0 fL Final   • Platelets 10/18/2022 167  140 - 450 10*3/mm3 Final   • Glucose 10/18/2022 83  65 - 99 mg/dL Final   • BUN 10/18/2022 13  6 - 20 mg/dL Final   • Creatinine 10/18/2022 1.12  0.76 - 1.27 mg/dL Final   • Sodium 10/18/2022 140  136 - 145 mmol/L Final   • Potassium 10/18/2022 4.1  3.5 - 5.2 mmol/L Final   • Chloride 10/18/2022 104  98 - 107 mmol/L Final   • CO2 10/18/2022 27.2  22.0 - 29.0 mmol/L Final   • Calcium 10/18/2022 9.6  8.6 - 10.5 mg/dL Final   • Total Protein 10/18/2022 6.8  6.0 - 8.5 g/dL Final   • Albumin 10/18/2022 4.20  3.50 - 5.20 g/dL Final   • ALT (SGPT) 10/18/2022 34  1 - 41 U/L Final   • AST (SGOT) 10/18/2022 27  1 - 40 U/L Final   • Alkaline Phosphatase 10/18/2022 87  39 - 117 U/L Final   • Total Bilirubin 10/18/2022 0.6  0.0 - 1.2 mg/dL Final   • Globulin 10/18/2022 2.6  gm/dL Final   • A/G Ratio 10/18/2022 1.6  g/dL Final   • BUN/Creatinine Ratio 10/18/2022 11.6  7.0 - 25.0 Final   • Anion Gap 10/18/2022 8.8  5.0 - 15.0 mmol/L Final   • eGFR 10/18/2022 81.0  >60.0 mL/min/1.73 Final    National Kidney Foundation and American Society of Nephrology (ASN) Task Force recommended calculation based on the Chronic Kidney Disease Epidemiology Collaboration (CKD-EPI) equation refit without adjustment for race.   • Total Cholesterol 10/18/2022 138  0 - 200 mg/dL Final   • Triglycerides 10/18/2022 74  0 - 150 mg/dL Final   • HDL Cholesterol 10/18/2022 50  40 - 60 mg/dL Final   • LDL Cholesterol  10/18/2022 73  0 - 100 mg/dL Final   • VLDL Cholesterol 10/18/2022 15  5 - 40 mg/dL Final   • LDL/HDL Ratio 10/18/2022 1.46   Final   • PSA 10/18/2022 0.796  0.000 - 4.000 ng/mL Final   • TSH 10/18/2022 2.520  0.270 - 4.200 uIU/mL Final   • Vitamin B-12 10/18/2022 379  211 - 946 pg/mL Final         Assessment & Plan   Problems  Addressed this Visit    None  Visit Diagnoses     Bipolar affective disorder, mixed (HCC)    -  Primary    Relevant Medications    atomoxetine (Strattera) 40 MG capsule    Seasonal depression (HCC)        Relevant Medications    atomoxetine (Strattera) 40 MG capsule    Attention or concentration deficit        Relevant Medications    atomoxetine (Strattera) 40 MG capsule    Medication management          Diagnoses       Codes Comments    Bipolar affective disorder, mixed (HCC)    -  Primary ICD-10-CM: F31.60  ICD-9-CM: 296.60     Seasonal depression (HCC)     ICD-10-CM: F33.8  ICD-9-CM: 296.99     Attention or concentration deficit     ICD-10-CM: R41.840  ICD-9-CM: 799.51     Medication management     ICD-10-CM: Z79.899  ICD-9-CM: V58.69           Visit Diagnoses:    ICD-10-CM ICD-9-CM   1. Bipolar affective disorder, mixed (HCC)  F31.60 296.60   2. Seasonal depression (HCC)  F33.8 296.99   3. Attention or concentration deficit  R41.840 799.51   4. Medication management  Z79.899 V58.69     Patient to continue Prozac and Lamictal, no refills needed at this time.  Strattera initiated at 40 mg daily. Patient is educated upon risks versus benefits as well as side effects and when to seek care in an emergency setting.  Patient verbalized understanding. Psychological testing recommended. Discussed if patient should need controlled medication in future this will need to be addressed in-person unless alternate direction provided by CHARLES regarding regulations with use of telehelath.  Follow up in 4 weeks or sooner if warranted.    GOALS:  Short Term Goals: Patient will be compliant with medication, and patient will have no significant medication related side effects.  Patient will be engaged in psychotherapy as indicated.  Patient will report subjective improvement of symptoms.  Long term goals: To stabilize mood and treat/improve subjective symptoms, the patient will stay out of the hospital, the patient will be at an  optimal level of functioning, and the patient will take all medications as prescribed.  The patient/guardian verbalized understanding and agreement with goals that were mutually set.      TREATMENT PLAN: Continue supportive psychotherapy efforts and medications as indicated.  Pharmacological and Non-Pharmacological treatment options discussed during today's visit. Patient/Guardian acknowledged and verbally consented with current treatment plan and was educated on the importance of compliance with treatment and follow-up appointments.      MEDICATION ISSUES:  Discussed medication options and treatment plan of prescribed medication as well as the risks, benefits, any black box warnings, and side effects including potential falls, possible impaired driving, and metabolic adversities among others. Patient is agreeable to call the office with any worsening of symptoms or onset of side effects, or if any concerns or questions arise.  The contact information for the office is made available to the patient. Patient is agreeable to call 911 or go to the nearest ER should they begin having any SI/HI, or if any urgent concerns arise. No medication side effects or related complaints today.     MEDS ORDERED DURING VISIT:  New Medications Ordered This Visit   Medications   • atomoxetine (Strattera) 40 MG capsule     Sig: Take 1 capsule by mouth Daily.     Dispense:  30 capsule     Refill:  0       Follow Up Appointment:   Return in about 4 weeks (around 4/12/2023) for Recheck.             This document has been electronically signed by DEVEN uMrillo  March 15, 2023 14:56 EDT    Some of the data in this electronic note has been brought forward from a previous encounter, any necessary changes have been made, it has been reviewed by this APRN, and it is accurate.    Dictated Utilizing Dragon Dictation: Part of this note may be an electronic transcription/translation of spoken language to printed text using the Dragon  Dictation System.

## 2023-03-29 ENCOUNTER — TELEPHONE (OUTPATIENT)
Dept: PSYCHIATRY | Facility: CLINIC | Age: 48
End: 2023-03-29
Payer: COMMERCIAL

## 2023-03-30 DIAGNOSIS — F33.8 SEASONAL DEPRESSION: ICD-10-CM

## 2023-03-30 RX ORDER — FLUOXETINE HYDROCHLORIDE 20 MG/1
40 CAPSULE ORAL DAILY
Qty: 90 CAPSULE | Refills: 1 | Status: SHIPPED | OUTPATIENT
Start: 2023-03-30

## 2023-04-10 DIAGNOSIS — R41.840 ATTENTION OR CONCENTRATION DEFICIT: ICD-10-CM

## 2023-04-10 RX ORDER — ATOMOXETINE 40 MG/1
CAPSULE ORAL
Qty: 30 CAPSULE | Refills: 0 | Status: SHIPPED | OUTPATIENT
Start: 2023-04-10 | End: 2023-04-13 | Stop reason: SINTOL

## 2023-04-13 ENCOUNTER — TELEMEDICINE (OUTPATIENT)
Dept: PSYCHIATRY | Facility: CLINIC | Age: 48
End: 2023-04-13
Payer: COMMERCIAL

## 2023-04-13 DIAGNOSIS — F31.60 BIPOLAR AFFECTIVE DISORDER, MIXED: ICD-10-CM

## 2023-04-13 DIAGNOSIS — F33.8 SEASONAL DEPRESSION: ICD-10-CM

## 2023-04-13 DIAGNOSIS — Z79.899 MEDICATION MANAGEMENT: ICD-10-CM

## 2023-04-13 DIAGNOSIS — R41.840 ATTENTION OR CONCENTRATION DEFICIT: Primary | ICD-10-CM

## 2023-04-13 PROCEDURE — 99214 OFFICE O/P EST MOD 30 MIN: CPT

## 2023-04-13 RX ORDER — VILOXAZINE HYDROCHLORIDE 100 MG/1
100 CAPSULE, EXTENDED RELEASE ORAL DAILY
Qty: 30 CAPSULE | Refills: 2 | Status: SHIPPED | OUTPATIENT
Start: 2023-04-13

## 2023-04-13 RX ORDER — FLUOXETINE HYDROCHLORIDE 20 MG/1
40 CAPSULE ORAL DAILY
Qty: 60 CAPSULE | Refills: 2 | Status: SHIPPED | OUTPATIENT
Start: 2023-04-13

## 2023-04-13 RX ORDER — LAMOTRIGINE 25 MG/1
TABLET ORAL
Qty: 120 TABLET | Refills: 2 | Status: SHIPPED | OUTPATIENT
Start: 2023-04-13

## 2023-04-13 NOTE — PROGRESS NOTES
"This provider is located at the [facility] (through [facility]), [address], [place], [address] using a secure video visit through [employer]. Patient is being seen remotely via telehealth at their home address in [place], and stated they are in a secure environment for this session.   The patient's condition being consulted/diagnosed/treated is appropriate for telemedicine. The provider identified herself as well as her credentials.   The patient, and/or patients guardian, consent to be seen remotely, and when consent is given they understand that the consent allows for patient identifiable information to be sent to a third party as needed. They may refuse to be seen remotely at any time. The electronic data is encrypted and password protected, and the patient and/or guardian has been advised of the potential risks to privacy not withstanding such measures.   The use of a video visit has been reviewed with the patient and verbal informed consent has been obtained.   Patient identifiers used: Name and .    Subjective   Ronaldo Cantu is a 47 y.o. male who presents today for follow up    Chief Complaint:    Chief Complaint   Patient presents with   • Anxiety   • Depression   • concentration deficit   • Med Management        History of Present Illness:   History of Present Illness  Patient is a 47-year-old male presenting for 1 month follow-up related to depression, anxiety, and irritability.  Patient continues to notice efficacy with use of medications, however unfortunately states \"I can feel agitation creeping back in.  But I am able to recognize it and I do not speak out.\"  He did not notice mood changes with use of Strattera, although states he could not tolerate that medication due to side effects experienced including \"I could not sleep, it messed with urination.\"  He discontinued this medication and has been off approximately 3 weeks.  He states sleep has returned " "to normal, sleeping approximately 6 to 7 hours nightly.  Denies appetite changes.  PHQ score reduced from 4-2, indicating minimal depression.  JACQUELINE score remains the same with a score of 1 indicating minimal anxiety.  He voices compliance with the use of Lamictal and Prozac.  Regarding concentration since discontinuing Strattera, patient states \"I am still distracted.  I went to [employer] on  and there went my mind.  I had to pull myself back in and then I was right back out.\"  He states he \"checks out\" of conversations frequently, is distracted during evaluation today.  Remains restless.  He denies SI, HI, SIB, or hallucinations currently and is convincing.  Denies alternate side effects with use of medications, denies rash.    Patient resides in a home with his wife of 7 years whom he cites as supportive.  He has 1 biological child and 2 stepchildren, all adults.  He has a college degree in biology and works full-time at [employer] as a , thoroughly enjoys his job.  Patient has returned to work, cites some stressors involved with management at his current workplace, also possibly contributing to current symptoms.  [employer] preference.  Enjoys golfing.  Denies drug or alcohol use.       The following portions of the patient's history were reviewed and updated as appropriate: allergies, current medications, past family history, past medical history, past social history, past surgical history and problem list.    Past Psychiatric History:  Began Treatment:7 years ago for anger management  Diagnoses:unsure  Psychiatrist:Denies  Therapist:previously  Admission History:Denies  Medication Trials:effexor  Self Harm: Denies  Suicide Attempts:Denies   Psychosis, Anxiety, Depression: N/A    Past Medical History:  Past Medical History:   Diagnosis Date   • Nephrolithiasis        Substance Abuse History:   Types:Denies all, including illicit  Withdrawal Symptoms:Denies  Longest Period Sober:Not " Applicable   AA: Not applicable     Social History:  Social History     Socioeconomic History   • Marital status:    • Number of children: 3   Tobacco Use   • Smoking status: Never   • Smokeless tobacco: Never   Substance and Sexual Activity   • Alcohol use: Yes     Alcohol/week: 1.0 standard drink     Types: 1 Cans of beer per week     Comment: 1 per day   • Drug use: Never       Family History:  Family History   Problem Relation Age of Onset   • Heart attack Mother    • Suicide Attempts Sister    • Cancer Maternal Grandmother    • Lung cancer Paternal Grandfather        Past Surgical History:  Past Surgical History:   Procedure Laterality Date   • APPENDECTOMY  09/2010   • CARDIAC ABLATION  11/2019   • CERVICAL FUSION  10/2012   • KNEE ARTHROPLASTY, PARTIAL REPLACEMENT  12/2022   • KNEE ARTHROSCOPY  05/2021   • SHOULDER ARTHROSCOPY         Problem List:  Patient Active Problem List   Diagnosis   • Chronic atrial fibrillation   • Other hyperlipidemia   • Primary osteoarthritis of both knees   • Degeneration of lumbar or lumbosacral intervertebral disc   • Toenail fungus       Allergy:   No Known Allergies     Current Medications:   Current Outpatient Medications   Medication Sig Dispense Refill   • FLUoxetine (PROzac) 20 MG capsule Take 2 capsules by mouth Daily. 60 capsule 2   • lamoTRIgine (LaMICtal) 25 MG tablet TAKE 2 TABLETS BY MOUTH EVERY  tablet 2   • atorvastatin (LIPITOR) 20 MG tablet TAKE 1 TABLET BY MOUTH EVERY DAY IN THE EVENING 90 tablet 3   • HYDROcodone-acetaminophen (Norco) 7.5-325 MG per tablet Take 1 tablet by mouth Every 6 (Six) Hours As Needed (stone pain, DO NOT DRIVE ON MEDS). 30 tablet 0   • tamsulosin (FLOMAX) 0.4 MG capsule 24 hr capsule Take 1 capsule by mouth Daily. 90 capsule 1   • tiZANidine (ZANAFLEX) 4 MG tablet      • Viloxazine HCl ER (Qelbree) 100 MG capsule sustained-release 24 hr Take 1 capsule by mouth Daily. 30 capsule 2     No current facility-administered  medications for this visit.       Review of Systems:    Review of Systems   Constitutional: Negative.    HENT: Negative.    Eyes: Negative.    Respiratory: Negative.    Cardiovascular: Negative.    Gastrointestinal: Negative.    Endocrine: Negative.    Genitourinary: Negative.         Kidney stone   Musculoskeletal: Positive for back pain.   Skin: Negative.    Neurological: Negative.  Negative for seizures.   Hematological: Negative.    Psychiatric/Behavioral: Positive for decreased concentration, positive for hyperactivity and stress. The patient is nervous/anxious.          Physical Exam:   Physical Exam  Constitutional:       Appearance: Normal appearance. He is normal weight.   HENT:      Head: Normocephalic.      Nose: Nose normal.   Pulmonary:      Effort: Pulmonary effort is normal.   Musculoskeletal:         General: Normal range of motion.      Cervical back: Normal range of motion.   Neurological:      General: No focal deficit present.      Mental Status: He is alert and oriented to person, place, and time. Mental status is at baseline.   Psychiatric:         Attention and Perception: Perception normal. He is inattentive.         Mood and Affect: Affect normal. Mood is anxious.         Speech: Speech is rapid and pressured.         Behavior: Behavior is hyperactive. Behavior is cooperative.         Thought Content: Thought content normal.         Cognition and Memory: Cognition and memory normal.         Judgment: Judgment normal.     Vitals:  There were no vitals taken for this visit. There is no height or weight on file to calculate BMI.  Due to extenuating circumstances and possible current health risks associated with the patient being present in a clinical setting (with current health restrictions in place in regards to possible COVID 19 transmission/exposure), the patient was seen remotely today via a MyChart Video Visit through [facility].  Unable to obtain vital signs due to nature of remote  visit.  Height stated at 6ft1 inches.  Weight stated at 183 pounds.    Last 3 Blood Pressure Readings:  BP Readings from Last 3 Encounters:   01/16/23 114/68   01/03/23 104/78   12/19/22 104/70       PHQ-9 Score:   PHQ-9 Total Score:   PHQ-9 Depression Screening  Little interest or pleasure in doing things? (P) 0-->not at all   Feeling down, depressed, or hopeless? (P) 0-->not at all   Trouble falling or staying asleep, or sleeping too much? (P) 0-->not at all   Feeling tired or having little energy? (P) 0-->not at all   Poor appetite or overeating? (P) 0-->not at all   Feeling bad about yourself - or that you are a failure or have let yourself or your family down? (P) 0-->not at all   Trouble concentrating on things, such as reading the newspaper or watching television? (P) 1-->several days   Moving or speaking so slowly that other people could have noticed? Or the opposite - being so fidgety or restless that you have been moving around a lot more than usual? (P) 1-->several days   Thoughts that you would be better off dead, or of hurting yourself in some way? (P) 0-->not at all   PHQ-9 Total Score (P) 2   If you checked off any problems, how difficult have these problems made it for you to do your work, take care of things at home, or get along with other people? (P) not difficult at all       JACQUELINE-7 Score:   Feeling nervous, anxious or on edge: (P) Not at all  Not being able to stop or control worrying: (P) Not at all  Worrying too much about different things: (P) Not at all  Trouble Relaxing: (P) Not at all  Being so restless that it is hard to sit still: (P) Not at all  Feeling afraid as if something awful might happen: (P) Not at all  Becoming easily annoyed or irritable: (P) Several days  JACQUELINE 7 Total Score: (P) 1  If you checked any problems, how difficult have these problems made it for you to do your work, take care of things at home, or get along with other people: (P) Not difficult at all     Mental Status  Exam:   Hygiene:   good  Cooperation:  Cooperative  Eye Contact:  Good  Psychomotor Behavior:  Restless  Affect:  Appropriate  Mood: anxious  Hopelessness: Denies  Speech:  Normal  Thought Process:  Goal directed  Thought Content:  Normal  Suicidal:  None  Homicidal:  None  Hallucinations:  None  Delusion:  None  Memory:  Intact  Orientation:  Grossly intact  Reliability:  good  Insight:  Good  Judgement:  Fair  Impulse Control:  Fair  Physical/Medical Issues:  back surgeries, knee surgeries, cardiac ablation 2019       Lab Results:   Lab on 02/01/2023   Component Date Value Ref Range Status   • 25 Hydroxy, Vitamin D 02/01/2023 42.6  30.0 - 100.0 ng/ml Final   Office Visit on 01/16/2023   Component Date Value Ref Range Status   • Color 01/16/2023 Dark Yellow  Yellow, Straw, Dark Yellow, Tali Final   • Clarity, UA 01/16/2023 Slightly Cloudy (A)  Clear Final   • Specific Gravity  01/16/2023 1.025  1.005 - 1.030 Final   • pH, Urine 01/16/2023 6.0  5.0 - 8.0 Final   • Leukocytes 01/16/2023 Negative  Negative Final   • Nitrite, UA 01/16/2023 Negative  Negative Final   • Protein, POC 01/16/2023 Trace (A)  Negative mg/dL Final    15 mg/dL   • Glucose, UA 01/16/2023 Negative  Negative mg/dL Final   • Ketones, UA 01/16/2023 Negative  Negative Final   • Urobilinogen, UA 01/16/2023 Normal  Normal, 0.2 E.U./dL Final    0.2 mg/dL   • Bilirubin 01/16/2023 1 mg/dL (A)  Negative Final    1 mg/dL   • Blood, UA 01/16/2023 3+ (A)  Negative Final    200 Gordon/uL   • Lot Number 01/16/2023 [id number]   Final   • Expiration Date 01/16/2023 3/25/2024   Final   Lab on 10/18/2022   Component Date Value Ref Range Status   • WBC 10/18/2022 6.06  3.40 - 10.80 10*3/mm3 Final   • RBC 10/18/2022 5.02  4.14 - 5.80 10*6/mm3 Final   • Hemoglobin 10/18/2022 14.9  13.0 - 17.7 g/dL Final   • Hematocrit 10/18/2022 44.3  37.5 - 51.0 % Final   • MCV 10/18/2022 88.2  79.0 - 97.0 fL Final   • MCH 10/18/2022 29.7  26.6 - 33.0 pg Final   • MCHC 10/18/2022  33.6  31.5 - 35.7 g/dL Final   • RDW 10/18/2022 13.1  12.3 - 15.4 % Final   • RDW-SD 10/18/2022 42.3  37.0 - 54.0 fl Final   • MPV 10/18/2022 10.9  6.0 - 12.0 fL Final   • Platelets 10/18/2022 167  140 - 450 10*3/mm3 Final   • Glucose 10/18/2022 83  65 - 99 mg/dL Final   • BUN 10/18/2022 13  6 - 20 mg/dL Final   • Creatinine 10/18/2022 1.12  0.76 - 1.27 mg/dL Final   • Sodium 10/18/2022 140  136 - 145 mmol/L Final   • Potassium 10/18/2022 4.1  3.5 - 5.2 mmol/L Final   • Chloride 10/18/2022 104  98 - 107 mmol/L Final   • CO2 10/18/2022 27.2  22.0 - 29.0 mmol/L Final   • Calcium 10/18/2022 9.6  8.6 - 10.5 mg/dL Final   • Total Protein 10/18/2022 6.8  6.0 - 8.5 g/dL Final   • Albumin 10/18/2022 4.20  3.50 - 5.20 g/dL Final   • ALT (SGPT) 10/18/2022 34  1 - 41 U/L Final   • AST (SGOT) 10/18/2022 27  1 - 40 U/L Final   • Alkaline Phosphatase 10/18/2022 87  39 - 117 U/L Final   • Total Bilirubin 10/18/2022 0.6  0.0 - 1.2 mg/dL Final   • Globulin 10/18/2022 2.6  gm/dL Final   • A/G Ratio 10/18/2022 1.6  g/dL Final   • BUN/Creatinine Ratio 10/18/2022 11.6  7.0 - 25.0 Final   • Anion Gap 10/18/2022 8.8  5.0 - 15.0 mmol/L Final   • eGFR 10/18/2022 81.0  >60.0 mL/min/1.73 Final    National Kidney Foundation and American Society of Nephrology (ASN) Task Force recommended calculation based on the Chronic Kidney Disease Epidemiology Collaboration (CKD-EPI) equation refit without adjustment for race.   • Total Cholesterol 10/18/2022 138  0 - 200 mg/dL Final   • Triglycerides 10/18/2022 74  0 - 150 mg/dL Final   • HDL Cholesterol 10/18/2022 50  40 - 60 mg/dL Final   • LDL Cholesterol  10/18/2022 73  0 - 100 mg/dL Final   • VLDL Cholesterol 10/18/2022 15  5 - 40 mg/dL Final   • LDL/HDL Ratio 10/18/2022 1.46   Final   • PSA 10/18/2022 0.796  0.000 - 4.000 ng/mL Final   • TSH 10/18/2022 2.520  0.270 - 4.200 uIU/mL Final   • Vitamin B-12 10/18/2022 379  211 - 946 pg/mL Final         Assessment & Plan   Problems Addressed this Visit     None  Visit Diagnoses     Attention or concentration deficit    -  Primary    Relevant Medications    Viloxazine HCl ER (Qelbree) 100 MG capsule sustained-release 24 hr    Seasonal depression        Relevant Medications    FLUoxetine (PROzac) 20 MG capsule    Viloxazine HCl ER (Qelbree) 100 MG capsule sustained-release 24 hr    Bipolar affective disorder, mixed        Relevant Medications    FLUoxetine (PROzac) 20 MG capsule    lamoTRIgine (LaMICtal) 25 MG tablet    Viloxazine HCl ER (Qelbree) 100 MG capsule sustained-release 24 hr    Medication management        Relevant Medications    FLUoxetine (PROzac) 20 MG capsule    lamoTRIgine (LaMICtal) 25 MG tablet    Viloxazine HCl ER (Qelbree) 100 MG capsule sustained-release 24 hr      Diagnoses       Codes Comments    Attention or concentration deficit    -  Primary ICD-10-CM: R41.840  ICD-9-CM: 799.51     Seasonal depression     ICD-10-CM: F33.8  ICD-9-CM: 296.99     Bipolar affective disorder, mixed     ICD-10-CM: F31.60  ICD-9-CM: 296.60     Medication management     ICD-10-CM: Z79.899  ICD-9-CM: V58.69           Visit Diagnoses:    ICD-10-CM ICD-9-CM   1. Attention or concentration deficit  R41.840 799.51   2. Seasonal depression  F33.8 296.99   3. Bipolar affective disorder, mixed  F31.60 296.60   4. Medication management  Z79.899 V58.69     Prozac refilled.  Lamictal increased to 50 mg twice daily.  Quelbree 100 mg daily initiated.  Patient is instructed not to take this medication with Zanaflex due to side effects, verbalizes understanding. Patient is educated upon risks versus benefits as well as side effects and when to seek care in an emergency setting.  Patient verbalized understanding.  Follow up in 4 weeks or sooner if needed.    GOALS:  Short Term Goals: Patient will be compliant with medication, and patient will have no significant medication related side effects.  Patient will be engaged in psychotherapy as indicated.  Patient will report subjective  improvement of symptoms.  Long term goals: To stabilize mood and treat/improve subjective symptoms, the patient will stay out of the hospital, the patient will be at an optimal level of functioning, and the patient will take all medications as prescribed.  The patient/guardian verbalized understanding and agreement with goals that were mutually set.      TREATMENT PLAN: Continue supportive psychotherapy efforts and medications as indicated.  Pharmacological and Non-Pharmacological treatment options discussed during today's visit. Patient/Guardian acknowledged and verbally consented with current treatment plan and was educated on the importance of compliance with treatment and follow-up appointments.      MEDICATION ISSUES:  Discussed medication options and treatment plan of prescribed medication as well as the risks, benefits, any black box warnings, and side effects including potential falls, possible impaired driving, and metabolic adversities among others. Patient is agreeable to call the office with any worsening of symptoms or onset of side effects, or if any concerns or questions arise.  The contact information for the office is made available to the patient. Patient is agreeable to call 911 or go to the nearest ER should they begin having any SI/HI, or if any urgent concerns arise. No medication side effects or related complaints today.     MEDS ORDERED DURING VISIT:  New Medications Ordered This Visit   Medications   • FLUoxetine (PROzac) 20 MG capsule     Sig: Take 2 capsules by mouth Daily.     Dispense:  60 capsule     Refill:  2   • lamoTRIgine (LaMICtal) 25 MG tablet     Sig: TAKE 2 TABLETS BY MOUTH EVERY DAY     Dispense:  120 tablet     Refill:  2   • Viloxazine HCl ER (Qelbree) 100 MG capsule sustained-release 24 hr     Sig: Take 1 capsule by mouth Daily.     Dispense:  30 capsule     Refill:  2       Follow Up Appointment:   Return in about 4 weeks (around 5/11/2023) for Recheck.             This  document has been electronically signed by DEVEN Murillo  April 13, 2023 12:52 EDT    Some of the data in this electronic note has been brought forward from a previous encounter, any necessary changes have been made, it has been reviewed by this APRN, and it is accurate.    Dictated Utilizing Dragon Dictation: Part of this note may be an electronic transcription/translation of spoken language to printed text using the Dragon Dictation System.

## 2023-05-11 ENCOUNTER — TELEMEDICINE (OUTPATIENT)
Dept: PSYCHIATRY | Facility: CLINIC | Age: 48
End: 2023-05-11
Payer: COMMERCIAL

## 2023-05-11 DIAGNOSIS — Z79.899 MEDICATION MANAGEMENT: ICD-10-CM

## 2023-05-11 DIAGNOSIS — F33.8 SEASONAL DEPRESSION: ICD-10-CM

## 2023-05-11 DIAGNOSIS — F31.60 BIPOLAR AFFECTIVE DISORDER, MIXED: ICD-10-CM

## 2023-05-11 DIAGNOSIS — R41.840 CONCENTRATION DEFICIT: Primary | ICD-10-CM

## 2023-05-11 RX ORDER — LAMOTRIGINE 25 MG/1
TABLET ORAL
Qty: 180 TABLET | Refills: 0 | Status: SHIPPED | OUTPATIENT
Start: 2023-05-11

## 2023-05-11 RX ORDER — CLONIDINE HYDROCHLORIDE 0.1 MG/1
0.1 TABLET ORAL NIGHTLY
Qty: 30 TABLET | Refills: 0 | Status: SHIPPED | OUTPATIENT
Start: 2023-05-11

## 2023-05-11 NOTE — PROGRESS NOTES
This provider is located at the [facility] (through [facility]), [address], [facility], [address] using a secure video visit through [employer]. Patient is being seen remotely via telehealth at their home address in [place], and stated they are in a secure environment for this session.   The patient's condition being consulted/diagnosed/treated is appropriate for telemedicine. The provider identified herself as well as her credentials.   The patient, and/or patients guardian, consent to be seen remotely, and when consent is given they understand that the consent allows for patient identifiable information to be sent to a third party as needed. They may refuse to be seen remotely at any time. The electronic data is encrypted and password protected, and the patient and/or guardian has been advised of the potential risks to privacy not withstanding such measures.   The use of a video visit has been reviewed with the patient and verbal informed consent has been obtained.   Patient identifiers used: Name and .    Subjective   Ronaldo Cantu is a 47 y.o. male who presents today for follow up    Chief Complaint:    Chief Complaint   Patient presents with   • Irritable   • Depression   • Med Management   • concentration deficit        History of Present Illness:   History of Present Illness  Patient is a 47-year-old male presenting for 1 month follow-up related to depression, anxiety, and irritability.  Patient states he is doing well today, unfortunately PHQ triggered thoughts of suicide.  Patient denies both active and passive SI currently.  He states he discontinued Kwell breed 3 days ago and has not had these thoughts since.  While on [place] he experienced thoughts of wanting to run his truck off the road.  He did have a plan when she is no longer experiencing.  Thoroughly discussed mood condition and need to attend emergency room or text or call [phone] should he develop these  "symptoms due to medications contributing to the symptoms.  He states currently he is doing well, denies both depression and anxiety rating both conditions a 0/10.  PHQ increased from 2-5, indicating mild depression.  JACQUELINE score of 0, screening negative.  He has tolerated increase to Lamictal appropriately, denies side effects with use of medications.  Denies irritability.  He is currently sleeping approximately 7 hours nightly, and regarding appetite he feels as though he is eating more but denies weight gain.  He states \"I feel better now.\"  Regarding his current mood he states \"I am good.\"  He denies SI, HI, SIB, or hallucinations currently and is convincing.  Denies medical status or situational changes since previous visit.  Regarding concentration, he continues to voice he is easily distracted and continues to struggle at work.  He has trialed Strattera and \"Gabi at this point, suffering side effects with both.    Patient resides in a home with his wife of 7 years whom he cites as supportive.  He has 1 biological child and 2 stepchildren, all adults.  He has a college degree in biology and works full-time at "[employer] as a , thoroughly enjoys his job.  Patient has returned to work, cites some stressors involved with management at his current workplace, also possibly contributing to current symptoms.  Oriental orthodox Latter day preference.  Enjoys golfing.  Denies drug or alcohol use.       The following portions of the patient's history were reviewed and updated as appropriate: allergies, current medications, past family history, past medical history, past social history, past surgical history and problem list.    Past Psychiatric History:  Began Treatment:7 years ago for anger management  Diagnoses:unsure  Psychiatrist:Denies  Therapist:previously  Admission History:Denies  Medication Trials:effexor straterra and quelbree  Self Harm: Denies  Suicide Attempts:Denies   Psychosis, Anxiety, Depression: " N/A    Past Medical History:  Past Medical History:   Diagnosis Date   • Nephrolithiasis        Substance Abuse History:   Types:Denies all, including illicit  Withdrawal Symptoms:Denies  Longest Period Sober:Not Applicable   AA: Not applicable     Social History:  Social History     Socioeconomic History   • Marital status:    • Number of children: 3   Tobacco Use   • Smoking status: Never   • Smokeless tobacco: Never   Substance and Sexual Activity   • Alcohol use: Yes     Alcohol/week: 1.0 standard drink     Types: 1 Cans of beer per week     Comment: 1 per day   • Drug use: Never       Family History:  Family History   Problem Relation Age of Onset   • Heart attack Mother    • Suicide Attempts Sister    • Cancer Maternal Grandmother    • Lung cancer Paternal Grandfather        Past Surgical History:  Past Surgical History:   Procedure Laterality Date   • APPENDECTOMY  09/2010   • CARDIAC ABLATION  11/2019   • CERVICAL FUSION  10/2012   • KNEE ARTHROPLASTY, PARTIAL REPLACEMENT  12/2022   • KNEE ARTHROSCOPY  05/2021   • SHOULDER ARTHROSCOPY         Problem List:  Patient Active Problem List   Diagnosis   • Chronic atrial fibrillation   • Other hyperlipidemia   • Primary osteoarthritis of both knees   • Degeneration of lumbar or lumbosacral intervertebral disc   • Toenail fungus       Allergy:   Allergies   Allergen Reactions   • Viloxazine Hcl Er Mental Status Change     Suicidal thoughts        Current Medications:   Current Outpatient Medications   Medication Sig Dispense Refill   • lamoTRIgine (LaMICtal) 25 MG tablet TAKE 2 TABLETS BY MOUTH EVERY  tablet 0   • atorvastatin (LIPITOR) 20 MG tablet TAKE 1 TABLET BY MOUTH EVERY DAY IN THE EVENING 90 tablet 3   • cloNIDine (CATAPRES) 0.1 MG tablet Take 1 tablet by mouth Every Night. 30 tablet 0   • FLUoxetine (PROzac) 20 MG capsule Take 2 capsules by mouth Daily. 60 capsule 2   • HYDROcodone-acetaminophen (Norco) 7.5-325 MG per tablet Take 1 tablet by  mouth Every 6 (Six) Hours As Needed (stone pain, DO NOT DRIVE ON MEDS). 30 tablet 0   • tamsulosin (FLOMAX) 0.4 MG capsule 24 hr capsule Take 1 capsule by mouth Daily. 90 capsule 1   • tiZANidine (ZANAFLEX) 4 MG tablet        No current facility-administered medications for this visit.       Review of Systems:    Review of Systems   Constitutional: Negative.    HENT: Negative.    Eyes: Negative.    Respiratory: Negative.    Cardiovascular: Negative.    Gastrointestinal: Negative.    Endocrine: Negative.    Genitourinary: Negative.         Kidney stone   Musculoskeletal: Positive for back pain.   Skin: Negative.    Neurological: Negative.  Negative for seizures.   Hematological: Negative.    Psychiatric/Behavioral: Positive for decreased concentration, positive for hyperactivity and stress. The patient is nervous/anxious.          Physical Exam:   Physical Exam  Constitutional:       Appearance: Normal appearance. He is normal weight.   HENT:      Head: Normocephalic.      Nose: Nose normal.   Pulmonary:      Effort: Pulmonary effort is normal.   Musculoskeletal:         General: Normal range of motion.      Cervical back: Normal range of motion.   Neurological:      General: No focal deficit present.      Mental Status: He is alert and oriented to person, place, and time. Mental status is at baseline.   Psychiatric:         Attention and Perception: Perception normal. He is inattentive.         Mood and Affect: Affect normal. Mood is anxious.         Speech: Speech is rapid and pressured.         Behavior: Behavior is hyperactive. Behavior is cooperative.         Thought Content: Thought content normal.         Cognition and Memory: Cognition and memory normal.         Judgment: Judgment normal.     Vitals:  There were no vitals taken for this visit. There is no height or weight on file to calculate BMI.  Due to extenuating circumstances and possible current health risks associated with the patient being present in  a clinical setting (with current health restrictions in place in regards to possible COVID 19 transmission/exposure), the patient was seen remotely today via a MyChart Video Visit through [facility].  Unable to obtain vital signs due to nature of remote visit.  Height stated at 6ft1 inches.  Weight stated at 183 pounds.    Last 3 Blood Pressure Readings:  BP Readings from Last 3 Encounters:   01/16/23 114/68   01/03/23 104/78   12/19/22 104/70       PHQ-9 Score:   PHQ-9 Total Score:   PHQ-9 Depression Screening  Little interest or pleasure in doing things? (P) 0-->not at all   Feeling down, depressed, or hopeless? (P) 1-->several days   Trouble falling or staying asleep, or sleeping too much? (P) 1-->several days   Feeling tired or having little energy? (P) 0-->not at all   Poor appetite or overeating? (P) 0-->not at all   Feeling bad about yourself - or that you are a failure or have let yourself or your family down? (P) 1-->several days   Trouble concentrating on things, such as reading the newspaper or watching television? (P) 1-->several days   Moving or speaking so slowly that other people could have noticed? Or the opposite - being so fidgety or restless that you have been moving around a lot more than usual? (P) 0-->not at all   Thoughts that you would be better off dead, or of hurting yourself in some way? (P) 1-->several days   PHQ-9 Total Score (P) 5   If you checked off any problems, how difficult have these problems made it for you to do your work, take care of things at home, or get along with other people? (P) somewhat difficult       JACQUELINE-7 Score:   Feeling nervous, anxious or on edge: (P) Not at all  Not being able to stop or control worrying: (P) Not at all  Worrying too much about different things: (P) Not at all  Trouble Relaxing: (P) Not at all  Being so restless that it is hard to sit still: (P) Not at all  Feeling afraid as if something awful might happen: (P) Not at all  Becoming easily annoyed or  irritable: (P) Not at all  JACQUELINE 7 Total Score: (P) 0  If you checked any problems, how difficult have these problems made it for you to do your work, take care of things at home, or get along with other people: (P) Not difficult at all     Mental Status Exam:   Hygiene:   good  Cooperation:  Cooperative  Eye Contact:  Good  Psychomotor Behavior:  Restless  Affect:  Appropriate  Mood: anxious  Hopelessness: Denies  Speech:  Normal  Thought Process:  Goal directed  Thought Content:  Normal  Suicidal:  None  Homicidal:  None  Hallucinations:  None  Delusion:  None  Memory:  Intact  Orientation:  Grossly intact  Reliability:  good  Insight:  Good  Judgement:  Fair  Impulse Control:  Fair  Physical/Medical Issues:  back surgeries, knee surgeries, cardiac ablation 2019       Lab Results:   Lab on 02/01/2023   Component Date Value Ref Range Status   • 25 Hydroxy, Vitamin D 02/01/2023 42.6  30.0 - 100.0 ng/ml Final   Office Visit on 01/16/2023   Component Date Value Ref Range Status   • Color 01/16/2023 Dark Yellow  Yellow, Straw, Dark Yellow, Tali Final   • Clarity, UA 01/16/2023 Slightly Cloudy (A)  Clear Final   • Specific Gravity  01/16/2023 1.025  1.005 - 1.030 Final   • pH, Urine 01/16/2023 6.0  5.0 - 8.0 Final   • Leukocytes 01/16/2023 Negative  Negative Final   • Nitrite, UA 01/16/2023 Negative  Negative Final   • Protein, POC 01/16/2023 Trace (A)  Negative mg/dL Final    15 mg/dL   • Glucose, UA 01/16/2023 Negative  Negative mg/dL Final   • Ketones, UA 01/16/2023 Negative  Negative Final   • Urobilinogen, UA 01/16/2023 Normal  Normal, 0.2 E.U./dL Final    0.2 mg/dL   • Bilirubin 01/16/2023 1 mg/dL (A)  Negative Final    1 mg/dL   • Blood, UA 01/16/2023 3+ (A)  Negative Final    200 Gordon/uL   • Lot Number 01/16/2023 [id number]   Final   • Expiration Date 01/16/2023 3/25/2024   Final         Assessment & Plan   Problems Addressed this Visit    None  Visit Diagnoses     Concentration deficit    -  Primary     Relevant Medications    cloNIDine (CATAPRES) 0.1 MG tablet    Seasonal depression        Medication management        Relevant Medications    cloNIDine (CATAPRES) 0.1 MG tablet    lamoTRIgine (LaMICtal) 25 MG tablet    Bipolar affective disorder, mixed        Relevant Medications    lamoTRIgine (LaMICtal) 25 MG tablet      Diagnoses       Codes Comments    Concentration deficit    -  Primary ICD-10-CM: R41.840  ICD-9-CM: 799.51     Seasonal depression     ICD-10-CM: F33.8  ICD-9-CM: 296.99     Medication management     ICD-10-CM: Z79.899  ICD-9-CM: V58.69     Bipolar affective disorder, mixed     ICD-10-CM: F31.60  ICD-9-CM: 296.60           Visit Diagnoses:    ICD-10-CM ICD-9-CM   1. Concentration deficit  R41.840 799.51   2. Seasonal depression  F33.8 296.99   3. Medication management  Z79.899 V58.69   4. Bipolar affective disorder, mixed  F31.60 296.60     Prozac refilled and Lamictal refilled.  Clonidine 0.1 nightly initiated.Patient is educated upon risks versus benefits as well as side effects and when to seek care in an emergency setting.  Patient verbalized understanding.  Discussed need to text or call [phone] if experiencing suicidal ideations, as well as report any symptoms and increasing depression.  Follow-up in 4 weeks or sooner if needed.    Patient was given ample time for questions and fully participated in treatment planning.  Patient was encouraged to call the clinic with any questions or concerns.  Patient was informed of access to emergency care. If patient were to develop any significant symptomatology, suicidal ideation, homicidal ideation, any concerns, or feel unsafe at any time they are to call the clinic and if unable to get immediate assistance should immediately call 911 or go to the nearest emergency room.  The patient is advised to remove or secure (lock away) all lethal weapons (including guns) and sharps (including razors, scissors, knives, etc.).  All medications (including any  prescribed and any over the counter medications) should be stored in a safe and secured location that is not obtainable by children/adolescents.  Patient was given an opportunity and encouraged to ask questions about their medication, illness, and treatment. Patient contracted verbally for the following: If you are experiencing an emotional crisis or have thoughts of harming yourself or others, please go to your nearest local emergency room or call 911. Will continue to re-assess medication response and side effects frequently to establish efficacy and ensure safety. Risks, any black box warnings, side effects, off label usage, and benefits of medication and treatment discussed with patient, along with potential adverse side effects of current and/or newly prescribed medication, alternative treatment options, and OTC medications.  Patient verbalized understanding of potential risks, any off label use of medication, any black box warnings, and any side effects in their own words. The patient verbalized understanding and agreed to comply with the safety plan discussed in their own words.  Patient given the number to the office. Number also available to the 24- hour suicide hotline 2-036-PKHAAUP      GOALS:  Short Term Goals: Patient will be compliant with medication, and patient will have no significant medication related side effects.  Patient will be engaged in psychotherapy as indicated.  Patient will report subjective improvement of symptoms.  Long term goals: To stabilize mood and treat/improve subjective symptoms, the patient will stay out of the hospital, the patient will be at an optimal level of functioning, and the patient will take all medications as prescribed.  The patient/guardian verbalized understanding and agreement with goals that were mutually set.      TREATMENT PLAN: Continue supportive psychotherapy efforts and medications as indicated.  Pharmacological and Non-Pharmacological treatment options  discussed during today's visit. Patient/Guardian acknowledged and verbally consented with current treatment plan and was educated on the importance of compliance with treatment and follow-up appointments.      MEDICATION ISSUES:  Discussed medication options and treatment plan of prescribed medication as well as the risks, benefits, any black box warnings, and side effects including potential falls, possible impaired driving, and metabolic adversities among others. Patient is agreeable to call the office with any worsening of symptoms or onset of side effects, or if any concerns or questions arise.  The contact information for the office is made available to the patient. Patient is agreeable to call 911 or go to the nearest ER should they begin having any SI/HI, or if any urgent concerns arise. No medication side effects or related complaints today.     MEDS ORDERED DURING VISIT:  New Medications Ordered This Visit   Medications   • cloNIDine (CATAPRES) 0.1 MG tablet     Sig: Take 1 tablet by mouth Every Night.     Dispense:  30 tablet     Refill:  0   • lamoTRIgine (LaMICtal) 25 MG tablet     Sig: TAKE 2 TABLETS BY MOUTH EVERY DAY     Dispense:  180 tablet     Refill:  0       Follow Up Appointment:   Return in about 4 weeks (around 6/8/2023) for Recheck.             This document has been electronically signed by DEVEN Murillo  May 11, 2023 11:59 EDT    Some of the data in this electronic note has been brought forward from a previous encounter, any necessary changes have been made, it has been reviewed by this APRN, and it is accurate.    Dictated Utilizing Dragon Dictation: Part of this note may be an electronic transcription/translation of spoken language to printed text using the Dragon Dictation System.

## 2023-05-12 ENCOUNTER — TELEPHONE (OUTPATIENT)
Dept: PSYCHIATRY | Facility: CLINIC | Age: 48
End: 2023-05-12
Payer: COMMERCIAL

## 2023-05-12 NOTE — TELEPHONE ENCOUNTER
Patient had left a voicemail regarding Medication. Office attempted to reach back out to patient unable to reach left voicemail .

## 2023-06-07 ENCOUNTER — TELEMEDICINE (OUTPATIENT)
Dept: PSYCHIATRY | Facility: CLINIC | Age: 48
End: 2023-06-07
Payer: COMMERCIAL

## 2023-06-07 DIAGNOSIS — F31.60 BIPOLAR AFFECTIVE DISORDER, MIXED: Primary | ICD-10-CM

## 2023-06-07 DIAGNOSIS — Z79.899 MEDICATION MANAGEMENT: ICD-10-CM

## 2023-06-07 DIAGNOSIS — R41.840 ATTENTION OR CONCENTRATION DEFICIT: ICD-10-CM

## 2023-06-07 RX ORDER — LAMOTRIGINE 25 MG/1
TABLET ORAL
Qty: 360 TABLET | Refills: 0 | Status: SHIPPED | OUTPATIENT
Start: 2023-06-07

## 2023-06-07 NOTE — LETTER
June 7, 2023    Ronaldo Cantu  [address] [address]      Psychiatry Referral    Psychiatrists in [place]:    [facility] (including [facility])  [address], [address] [place] [address]  [phone]    [facility]  [address]  [place] [address]  [phone]    [facility]  [address]  [place] [address]  [phone]    Dr. Jerson Brar  [address], [address]  [place] [address]  [phone]    Dr. Tennille Jose  [address]  [place] [address]  [phone]    Dr. Juan Antonio Castro  [address]  [place] [address]  [phone]    Dr. Fidel Martínez  [address]  [place] [address]  [phone]    [facility]  [address], [address]  [place] [address]  [phone]    [facility]  [address]  [place] [address]  [phone]    Dr. Sneha Mtz  [address] [place] [address]  [phone]    Dr. Barbara Kruger  [address] [place] [address]  [phone]    [facility] [place]  [address]  [place] [address]  [phone]    Dr. Chase Archuleta  [address] [place] [address]  [phone]    *Always check and make sure providers are within your network if you wish to use your insurance*                                Ninfa Leiva, APRN
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
## 2023-06-15 ENCOUNTER — TELEMEDICINE (OUTPATIENT)
Dept: PSYCHIATRY | Facility: CLINIC | Age: 48
End: 2023-06-15
Payer: COMMERCIAL

## 2023-06-15 DIAGNOSIS — F31.60 BIPOLAR AFFECTIVE DISORDER, MIXED: Primary | ICD-10-CM

## 2023-06-15 DIAGNOSIS — R41.840 CONCENTRATION DEFICIT: ICD-10-CM

## 2023-06-15 DIAGNOSIS — Z79.899 MEDICATION MANAGEMENT: ICD-10-CM

## 2023-06-15 DIAGNOSIS — F33.8 SEASONAL DEPRESSION: ICD-10-CM

## 2023-06-15 RX ORDER — FLUOXETINE HYDROCHLORIDE 20 MG/1
40 CAPSULE ORAL DAILY
Qty: 180 CAPSULE | Refills: 0 | Status: SHIPPED | OUTPATIENT
Start: 2023-06-15

## 2023-06-15 RX ORDER — CLONIDINE HYDROCHLORIDE 0.1 MG/1
0.1 TABLET ORAL NIGHTLY
Qty: 90 TABLET | Refills: 0 | Status: SHIPPED | OUTPATIENT
Start: 2023-06-15

## 2023-06-15 RX ORDER — LAMOTRIGINE 25 MG/1
TABLET ORAL
Qty: 360 TABLET | Refills: 0 | Status: SHIPPED | OUTPATIENT
Start: 2023-06-15

## 2023-06-15 NOTE — PROGRESS NOTES
"This provider is located at the [facility] (through [facility]), [address], [place], [address] using a secure video visit through [employer]. Patient is being seen remotely via telehealth at their home address in [place], and stated they are in a secure environment for this session.   The patient's condition being consulted/diagnosed/treated is appropriate for telemedicine. The provider identified herself as well as her credentials.   The patient, and/or patients guardian, consent to be seen remotely, and when consent is given they understand that the consent allows for patient identifiable information to be sent to a third party as needed. They may refuse to be seen remotely at any time. The electronic data is encrypted and password protected, and the patient and/or guardian has been advised of the potential risks to privacy not withstanding such measures.   The use of a video visit has been reviewed with the patient and verbal informed consent has been obtained.   Patient identifiers used: Name and .    Subjective   Ronaldo Cantu is a 47 y.o. male who presents today for follow up    Chief Complaint:    Chief Complaint   Patient presents with    Anxiety    Depression    Med Management    Sleeping Problem    concentration deficit        History of Present Illness:   History of Present Illness  Patient is a 47-year-old male presenting for 1 month follow-up related to depression, anxiety, and irritability.  Patient has noticed efficacy with use of clonidine, \"I cannot sleep without it.\"  He states while taking this medication he was receiving approximately 8 hours of sleep nightly however has since ran out of medication.  He states without it \"my brain is scrambled.  I do not have direction and I cannot stay focused.\"  He states his blood pressure has been stable while utilizing clonidine, was appropriate while at urgent care this month.  When asked if he feels " "medications are addressing his symptoms, \"yes I.  I think they do.\"  She denies side effects, denies rashes with use of Lamictal.  Voices compliance with medications.  He denies anxiety or depression, denies irritability aside from feeling irritated at his coworkers this morning.  He denies SI, HI, SIB, or hallucinations currently and is convincing.  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  He states this is continuing to heal.    Patient resides in a home with his wife of 7 years whom he cites as supportive.  He has 1 biological child and 2 stepchildren, all adults.  He has a college degree in biology and works full-time at [employer] as a , thoroughly enjoys his job.  He states he plans to retire in March, this is a newer decision for him but he states he will remain busy with working for himself.  He states physically work has been challenging with his current health conditions and working as many hours as he does.  [employer] preference.  Enjoys golfing.  Denies drug or alcohol use.     The following portions of the patient's history were reviewed and updated as appropriate: allergies, current medications, past family history, past medical history, past social history, past surgical history and problem list.    Past Psychiatric History:  Began Treatment: 7 years ago for anger management  Diagnoses: unsure  Psychiatrist:Denies  Therapist: previously  Admission History:Denies  Medication Trials: effexor straterra and quelbree  Self Harm: Denies  Suicide Attempts:Denies   Psychosis, Anxiety, Depression:  N/A    Past Medical History:  Past Medical History:   Diagnosis Date    Nephrolithiasis        Substance Abuse History:   Types:Denies all, including illicit  Withdrawal Symptoms:Denies  Longest Period Sober:Not Applicable   AA: Not applicable     Social History:  Social " History     Socioeconomic History    Marital status:     Number of children: 3   Tobacco Use    Smoking status: Never    Smokeless tobacco: Never   Substance and Sexual Activity    Alcohol use: Yes     Alcohol/week: 1.0 standard drink     Types: 1 Cans of beer per week     Comment: 1 per day    Drug use: Never       Family History:  Family History   Problem Relation Age of Onset    Heart attack Mother     Suicide Attempts Sister     Cancer Maternal Grandmother     Lung cancer Paternal Grandfather        Past Surgical History:  Past Surgical History:   Procedure Laterality Date    APPENDECTOMY  09/2010    CARDIAC ABLATION  11/2019    CERVICAL FUSION  10/2012    KNEE ARTHROPLASTY, PARTIAL REPLACEMENT  12/2022    KNEE ARTHROSCOPY  05/2021    SHOULDER ARTHROSCOPY         Problem List:  Patient Active Problem List   Diagnosis    Chronic atrial fibrillation    Other hyperlipidemia    Primary osteoarthritis of both knees    Degeneration of lumbar or lumbosacral intervertebral disc    Toenail fungus       Allergy:   Allergies   Allergen Reactions    Viloxazine Hcl Er Mental Status Change     Suicidal thoughts        Current Medications:   Current Outpatient Medications   Medication Sig Dispense Refill    cloNIDine (CATAPRES) 0.1 MG tablet Take 1 tablet by mouth Every Night. 90 tablet 0    FLUoxetine (PROzac) 20 MG capsule Take 2 capsules by mouth Daily. 180 capsule 0    lamoTRIgine (LaMICtal) 25 MG tablet Take 2 tabs by mouth (50mg) twice daily 360 tablet 0    silver sulfadiazine (SILVADENE, SSD) 1 % cream APPLY TOPICALLY TWICE A DAY FOR 5 DAYS      atorvastatin (LIPITOR) 20 MG tablet TAKE 1 TABLET BY MOUTH EVERY DAY IN THE EVENING 90 tablet 3    HYDROcodone-acetaminophen (Norco) 7.5-325 MG per tablet Take 1 tablet by mouth Every 6 (Six) Hours As Needed (stone pain, DO NOT DRIVE ON MEDS). 30 tablet 0    tiZANidine (ZANAFLEX) 4 MG tablet  (Patient not taking: Reported on 6/15/2023)       No current  facility-administered medications for this visit.       Review of Systems:    Review of Systems   Constitutional: Negative.    HENT: Negative.     Eyes: Negative.    Respiratory: Negative.     Cardiovascular: Negative.    Gastrointestinal: Negative.    Endocrine: Negative.    Genitourinary: Negative.         Kidney stone   Musculoskeletal:  Positive for back pain.   Skin: Negative.    Allergic/Immunologic: Negative.    Neurological: Negative.  Negative for seizures.   Hematological: Negative.    Psychiatric/Behavioral:  Positive for decreased concentration, positive for hyperactivity and stress. The patient is nervous/anxious.        Physical Exam:   Physical Exam  Constitutional:       Appearance: Normal appearance. He is normal weight.   HENT:      Head: Normocephalic.      Nose: Nose normal.   Pulmonary:      Effort: Pulmonary effort is normal.   Musculoskeletal:         General: Normal range of motion.      Cervical back: Normal range of motion.   Neurological:      General: No focal deficit present.      Mental Status: He is alert and oriented to person, place, and time. Mental status is at baseline.   Psychiatric:         Attention and Perception: Attention and perception normal.         Mood and Affect: Affect normal. Mood is anxious.         Speech: Speech is rapid and pressured.         Behavior: Behavior normal. Behavior is cooperative.         Thought Content: Thought content normal.         Cognition and Memory: Cognition and memory normal.         Judgment: Judgment normal.   Vitals:  There were no vitals taken for this visit. There is no height or weight on file to calculate BMI.  Due to extenuating circumstances and possible current health risks associated with the patient being present in a clinical setting (with current health restrictions in place in regards to possible COVID 19 transmission/exposure), the patient was seen remotely today via a MyChart Video Visit through [facility].  Unable to obtain  vital signs due to nature of remote visit.  Height stated at 6ft1 inches.  Weight stated at 183 pounds.    Last 3 Blood Pressure Readings:  BP Readings from Last 3 Encounters:   01/16/23 114/68   01/03/23 104/78   12/19/22 104/70       Mental Status Exam:   Hygiene:   good  Cooperation:  Cooperative  Eye Contact:  Good  Psychomotor Behavior:  Appropriate  Affect:  Appropriate  Mood: normal  Hopelessness: Denies  Speech:  Normal  Thought Process:  Goal directed  Thought Content:  Normal  Suicidal:  None  Homicidal:  None  Hallucinations:  None  Delusion:  None  Memory:  Intact  Orientation:  Grossly intact  Reliability:  good  Insight:  Good  Judgement:  Fair  Impulse Control:  Fair  Physical/Medical Issues:   back surgeries, knee surgeries, cardiac ablation 2019        Lab Results:   Lab on 02/01/2023   Component Date Value Ref Range Status    25 Hydroxy, Vitamin D 02/01/2023 42.6  30.0 - 100.0 ng/ml Final   Office Visit on 01/16/2023   Component Date Value Ref Range Status    Color 01/16/2023 Dark Yellow  Yellow, Straw, Dark Yellow, Tali Final    Clarity, UA 01/16/2023 Slightly Cloudy (A)  Clear Final    Specific Gravity  01/16/2023 1.025  1.005 - 1.030 Final    pH, Urine 01/16/2023 6.0  5.0 - 8.0 Final    Leukocytes 01/16/2023 Negative  Negative Final    Nitrite, UA 01/16/2023 Negative  Negative Final    Protein, POC 01/16/2023 Trace (A)  Negative mg/dL Final    15 mg/dL    Glucose, UA 01/16/2023 Negative  Negative mg/dL Final    Ketones, UA 01/16/2023 Negative  Negative Final    Urobilinogen, UA 01/16/2023 Normal  Normal, 0.2 E.U./dL Final    0.2 mg/dL    Bilirubin 01/16/2023 1 mg/dL (A)  Negative Final    1 mg/dL    Blood, UA 01/16/2023 3+ (A)  Negative Final    200 Gordon/uL    Lot Number 01/16/2023 [id number]   Final    Expiration Date 01/16/2023 3/25/2024   Final         Assessment & Plan   Problems Addressed this Visit    None  Visit Diagnoses       Bipolar affective disorder, mixed    -  Primary     Relevant Medications    FLUoxetine (PROzac) 20 MG capsule    lamoTRIgine (LaMICtal) 25 MG tablet    Concentration deficit        Relevant Medications    cloNIDine (CATAPRES) 0.1 MG tablet    Seasonal depression        Relevant Medications    FLUoxetine (PROzac) 20 MG capsule    Medication management        Relevant Medications    cloNIDine (CATAPRES) 0.1 MG tablet    FLUoxetine (PROzac) 20 MG capsule    lamoTRIgine (LaMICtal) 25 MG tablet          Diagnoses         Codes Comments    Bipolar affective disorder, mixed    -  Primary ICD-10-CM: F31.60  ICD-9-CM: 296.60     Concentration deficit     ICD-10-CM: R41.840  ICD-9-CM: 799.51     Seasonal depression     ICD-10-CM: F33.8  ICD-9-CM: 296.99     Medication management     ICD-10-CM: Z79.899  ICD-9-CM: V58.69             Visit Diagnoses:    ICD-10-CM ICD-9-CM   1. Bipolar affective disorder, mixed  F31.60 296.60   2. Concentration deficit  R41.840 799.51   3. Seasonal depression  F33.8 296.99   4. Medication management  Z79.899 V58.69     Prozac, clonidine, and Lamictal refilled at this time. Previously educated upon side effects with use of these medications as well as when to present for emergency services, denies at this time.  Pharmacy contacted to ensure correct orders have been received.  Follow up in 3 months or sooner if needed.     GOALS:  Short Term Goals: Patient will be compliant with medication, and patient will have no significant medication related side effects.  Patient will be engaged in psychotherapy as indicated.  Patient will report subjective improvement of symptoms.  Long term goals: To stabilize mood and treat/improve subjective symptoms, the patient will stay out of the hospital, the patient will be at an optimal level of functioning, and the patient will take all medications as prescribed.  The patient/guardian verbalized understanding and agreement with goals that were mutually set.      TREATMENT PLAN: Continue supportive psychotherapy  efforts and medications as indicated.  Pharmacological and Non-Pharmacological treatment options discussed during today's visit. Patient/Guardian acknowledged and verbally consented with current treatment plan and was educated on the importance of compliance with treatment and follow-up appointments.      MEDICATION ISSUES:  Discussed medication options and treatment plan of prescribed medication as well as the risks, benefits, any black box warnings, and side effects including potential falls, possible impaired driving, and metabolic adversities among others. Patient is agreeable to call the office with any worsening of symptoms or onset of side effects, or if any concerns or questions arise.  The contact information for the office is made available to the patient. Patient is agreeable to call 911 or go to the nearest ER should they begin having any SI/HI, or if any urgent concerns arise. No medication side effects or related complaints today.     MEDS ORDERED DURING VISIT:  New Medications Ordered This Visit   Medications    cloNIDine (CATAPRES) 0.1 MG tablet     Sig: Take 1 tablet by mouth Every Night.     Dispense:  90 tablet     Refill:  0    FLUoxetine (PROzac) 20 MG capsule     Sig: Take 2 capsules by mouth Daily.     Dispense:  180 capsule     Refill:  0    lamoTRIgine (LaMICtal) 25 MG tablet     Sig: Take 2 tabs by mouth (50mg) twice daily     Dispense:  360 tablet     Refill:  0       Follow Up Appointment:   Return in about 3 months (around 9/15/2023) for Recheck.             This document has been electronically signed by DEVEN Murillo  Franny 15, 2023 08:58 EDT    Some of the data in this electronic note has been brought forward from a previous encounter, any necessary changes have been made, it has been reviewed by this APRN, and it is accurate.    Dictated Utilizing Dragon Dictation: Part of this note may be an electronic transcription/translation of spoken language to printed text using the  Dragon Dictation System.

## 2023-06-19 ENCOUNTER — OFFICE VISIT (OUTPATIENT)
Dept: INTERNAL MEDICINE | Facility: CLINIC | Age: 48
End: 2023-06-19
Payer: COMMERCIAL

## 2023-06-19 VITALS
HEIGHT: 73 IN | HEART RATE: 71 BPM | SYSTOLIC BLOOD PRESSURE: 122 MMHG | BODY MASS INDEX: 23.72 KG/M2 | WEIGHT: 179 LBS | DIASTOLIC BLOOD PRESSURE: 68 MMHG | OXYGEN SATURATION: 97 %

## 2023-06-19 DIAGNOSIS — F90.0 ATTENTION DEFICIT HYPERACTIVITY DISORDER (ADHD), PREDOMINANTLY INATTENTIVE TYPE: ICD-10-CM

## 2023-06-19 DIAGNOSIS — F31.75 BIPOLAR DISORDER, IN PARTIAL REMISSION, MOST RECENT EPISODE DEPRESSED: ICD-10-CM

## 2023-06-19 DIAGNOSIS — E78.49 OTHER HYPERLIPIDEMIA: Primary | ICD-10-CM

## 2023-06-19 DIAGNOSIS — I48.20 CHRONIC ATRIAL FIBRILLATION: ICD-10-CM

## 2023-06-19 PROBLEM — F31.9 BIPOLAR AFFECTIVE DISORDER: Status: ACTIVE | Noted: 2023-06-19

## 2023-06-19 PROBLEM — F32.A DEPRESSION: Status: ACTIVE | Noted: 2023-06-19

## 2023-06-19 PROBLEM — F90.9 ATTENTION DEFICIT HYPERACTIVITY DISORDER (ADHD): Status: ACTIVE | Noted: 2023-06-19

## 2023-06-19 LAB
DEPRECATED RDW RBC AUTO: 41 FL (ref 37–54)
ERYTHROCYTE [DISTWIDTH] IN BLOOD BY AUTOMATED COUNT: 13.1 % (ref 12.3–15.4)
HCT VFR BLD AUTO: 42 % (ref 37.5–51)
HGB BLD-MCNC: 14.3 G/DL (ref 13–17.7)
MCH RBC QN AUTO: 29.5 PG (ref 26.6–33)
MCHC RBC AUTO-ENTMCNC: 34 G/DL (ref 31.5–35.7)
MCV RBC AUTO: 86.6 FL (ref 79–97)
PLATELET # BLD AUTO: 183 10*3/MM3 (ref 140–450)
PMV BLD AUTO: 11.1 FL (ref 6–12)
RBC # BLD AUTO: 4.85 10*6/MM3 (ref 4.14–5.8)
WBC NRBC COR # BLD: 7.12 10*3/MM3 (ref 3.4–10.8)

## 2023-06-19 PROCEDURE — 99214 OFFICE O/P EST MOD 30 MIN: CPT | Performed by: INTERNAL MEDICINE

## 2023-06-19 NOTE — PROGRESS NOTES
Patient is a 47 y.o. male who is here for a follow up of hyperlipidemia.  Chief Complaint   Patient presents with    Hyperlipidemia         HPI:    Here for mgmt of hyperlipidemia and afib.  No recurrence.  BP has been good.  No dizziness or lightheadedness.  No HAs.  Energy level is good.  Sleeping well.  Motivation is good.  Concentration is controlled.     History:     Patient Active Problem List   Diagnosis    Chronic atrial fibrillation    Other hyperlipidemia    Primary osteoarthritis of both knees    Degeneration of lumbar or lumbosacral intervertebral disc    Toenail fungus    Bipolar affective disorder    Depression    Attention deficit hyperactivity disorder (ADHD)       Past Medical History:   Diagnosis Date    Nephrolithiasis        Past Surgical History:   Procedure Laterality Date    APPENDECTOMY  09/2010    CARDIAC ABLATION  11/2019    CERVICAL FUSION  10/2012    KNEE ARTHROPLASTY, PARTIAL REPLACEMENT  12/2022    KNEE ARTHROSCOPY  05/2021    SHOULDER ARTHROSCOPY         Current Outpatient Medications on File Prior to Visit   Medication Sig    atorvastatin (LIPITOR) 20 MG tablet TAKE 1 TABLET BY MOUTH EVERY DAY IN THE EVENING    cloNIDine (CATAPRES) 0.1 MG tablet Take 1 tablet by mouth Every Night.    FLUoxetine (PROzac) 20 MG capsule Take 2 capsules by mouth Daily.    lamoTRIgine (LaMICtal) 25 MG tablet Take 2 tabs by mouth (50mg) twice daily    tiZANidine (ZANAFLEX) 4 MG tablet     [DISCONTINUED] HYDROcodone-acetaminophen (Norco) 7.5-325 MG per tablet Take 1 tablet by mouth Every 6 (Six) Hours As Needed (stone pain, DO NOT DRIVE ON MEDS). (Patient not taking: Reported on 6/19/2023)    [DISCONTINUED] silver sulfadiazine (SILVADENE, SSD) 1 % cream APPLY TOPICALLY TWICE A DAY FOR 5 DAYS (Patient not taking: Reported on 6/19/2023)     No current facility-administered medications on file prior to visit.       Family History   Problem Relation Age of Onset    Heart attack Mother     Suicide Attempts Sister  "    Cancer Maternal Grandmother     Lung cancer Paternal Grandfather        Social History     Socioeconomic History    Marital status:     Number of children: 3   Tobacco Use    Smoking status: Never    Smokeless tobacco: Never   Substance and Sexual Activity    Alcohol use: Yes     Alcohol/week: 1.0 standard drink     Types: 1 Cans of beer per week     Comment: 1 per day    Drug use: Never         Review of Systems   Constitutional:  Negative for chills, fatigue, fever and unexpected weight change.   HENT:  Negative for congestion, ear pain, hearing loss, rhinorrhea, sinus pressure, sore throat and trouble swallowing.    Eyes:  Negative for discharge and itching.   Respiratory:  Negative for cough, chest tightness and shortness of breath.    Cardiovascular:  Negative for chest pain, palpitations and leg swelling.   Gastrointestinal:  Negative for abdominal pain, blood in stool, constipation, diarrhea and vomiting.        12/21 without polyps    Endocrine: Negative for polydipsia and polyuria.   Genitourinary:  Negative for difficulty urinating, dysuria, enuresis, frequency, hematuria and urgency.        9/22 psa   Musculoskeletal:  Positive for arthralgias. Negative for back pain, gait problem and joint swelling.   Skin:  Negative for rash and wound.   Allergic/Immunologic: Negative for immunocompromised state.   Neurological:  Negative for dizziness, syncope, weakness, light-headedness and numbness.   Hematological:  Does not bruise/bleed easily.   Psychiatric/Behavioral:  Negative for behavioral problems, dysphoric mood and sleep disturbance. The patient is not nervous/anxious.      /68 (BP Location: Left arm, Patient Position: Sitting)   Pulse 71   Ht 185.4 cm (72.99\")   Wt 81.2 kg (179 lb)   SpO2 97%   BMI 23.62 kg/m²       Physical Exam  Constitutional:       Appearance: Normal appearance. He is well-developed.   HENT:      Head: Normocephalic and atraumatic.      Right Ear: External ear " normal.      Left Ear: External ear normal.      Nose: Nose normal.      Mouth/Throat:      Mouth: Mucous membranes are moist.      Pharynx: Oropharynx is clear.   Eyes:      Extraocular Movements: Extraocular movements intact.      Conjunctiva/sclera: Conjunctivae normal.      Pupils: Pupils are equal, round, and reactive to light.   Cardiovascular:      Rate and Rhythm: Normal rate and regular rhythm.      Pulses: Normal pulses.      Heart sounds: Normal heart sounds.   Pulmonary:      Effort: Pulmonary effort is normal.      Breath sounds: Normal breath sounds.   Abdominal:      General: Bowel sounds are normal.      Palpations: Abdomen is soft.   Musculoskeletal:         General: Normal range of motion.      Cervical back: Normal range of motion and neck supple.   Lymphadenopathy:      Cervical: No cervical adenopathy.   Skin:     General: Skin is warm and dry.   Neurological:      General: No focal deficit present.      Mental Status: He is alert and oriented to person, place, and time.   Psychiatric:         Mood and Affect: Mood normal.         Behavior: Behavior normal.         Thought Content: Thought content normal.       Procedure:      Discussion/Summary:    Hyperlipidemia-counseled on diet, fasting on Lipitor 20 mg at goal  Hx of Afib-no recurrence with the ablation  Knee osteoarthritis-f/u Ortho  Bipolar/ADD/depression-well controlled on current tx, [facility] notes reviewed  High risk meds-labs today unrevealing     6/19 labs noted and dw patient    Current Outpatient Medications:     atorvastatin (LIPITOR) 20 MG tablet, TAKE 1 TABLET BY MOUTH EVERY DAY IN THE EVENING, Disp: 90 tablet, Rfl: 3    cloNIDine (CATAPRES) 0.1 MG tablet, Take 1 tablet by mouth Every Night., Disp: 90 tablet, Rfl: 0    FLUoxetine (PROzac) 20 MG capsule, Take 2 capsules by mouth Daily., Disp: 180 capsule, Rfl: 0    lamoTRIgine (LaMICtal) 25 MG tablet, Take 2 tabs by mouth (50mg) twice daily, Disp: 360 tablet, Rfl: 0    tiZANidine  (ZANAFLEX) 4 MG tablet, , Disp: , Rfl:         Diagnoses and all orders for this visit:    1. Other hyperlipidemia (Primary)    2. Chronic atrial fibrillation  -     CBC (No Diff)  -     Comprehensive Metabolic Panel  -     TSH  -     Vitamin B12    3. Bipolar disorder, in partial remission, most recent episode depressed  -     CBC (No Diff)  -     Comprehensive Metabolic Panel  -     TSH  -     Vitamin B12    4. Attention deficit hyperactivity disorder (ADHD), predominantly inattentive type      Answers submitted by the patient for this visit:  Primary Reason for Visit (Submitted on 6/18/2023)  What is the primary reason for your visit?: Other  Other (Submitted on 6/18/2023)  Please describe your symptoms.: None; regular check up  Have you had these symptoms before?: No  How long have you been having these symptoms?: 1-4 days  Please list any medications you are currently taking for this condition.: None  Please describe any probable cause for these symptoms. : None

## 2023-06-20 LAB
ALBUMIN SERPL-MCNC: 4.8 G/DL (ref 3.5–5.2)
ALBUMIN/GLOB SERPL: 1.9 G/DL
ALP SERPL-CCNC: 79 U/L (ref 39–117)
ALT SERPL W P-5'-P-CCNC: 27 U/L (ref 1–41)
ANION GAP SERPL CALCULATED.3IONS-SCNC: 10 MMOL/L (ref 5–15)
AST SERPL-CCNC: 24 U/L (ref 1–40)
BILIRUB SERPL-MCNC: 0.3 MG/DL (ref 0–1.2)
BUN SERPL-MCNC: 16 MG/DL (ref 6–20)
BUN/CREAT SERPL: 13.7 (ref 7–25)
CALCIUM SPEC-SCNC: 10.2 MG/DL (ref 8.6–10.5)
CHLORIDE SERPL-SCNC: 105 MMOL/L (ref 98–107)
CO2 SERPL-SCNC: 28 MMOL/L (ref 22–29)
CREAT SERPL-MCNC: 1.17 MG/DL (ref 0.76–1.27)
EGFRCR SERPLBLD CKD-EPI 2021: 76.4 ML/MIN/1.73
GLOBULIN UR ELPH-MCNC: 2.5 GM/DL
GLUCOSE SERPL-MCNC: 91 MG/DL (ref 65–99)
POTASSIUM SERPL-SCNC: 4.3 MMOL/L (ref 3.5–5.2)
PROT SERPL-MCNC: 7.3 G/DL (ref 6–8.5)
SODIUM SERPL-SCNC: 143 MMOL/L (ref 136–145)
TSH SERPL DL<=0.05 MIU/L-ACNC: 3.43 UIU/ML (ref 0.27–4.2)
VIT B12 BLD-MCNC: 403 PG/ML (ref 211–946)

## 2023-09-07 ENCOUNTER — TELEMEDICINE (OUTPATIENT)
Dept: PSYCHIATRY | Facility: CLINIC | Age: 48
End: 2023-09-07
Payer: COMMERCIAL

## 2023-09-07 DIAGNOSIS — F31.60 BIPOLAR AFFECTIVE DISORDER, MIXED: Primary | ICD-10-CM

## 2023-09-07 DIAGNOSIS — Z79.899 MEDICATION MANAGEMENT: ICD-10-CM

## 2023-09-07 DIAGNOSIS — R41.840 CONCENTRATION DEFICIT: ICD-10-CM

## 2023-09-07 DIAGNOSIS — F33.8 SEASONAL DEPRESSION: ICD-10-CM

## 2023-09-07 RX ORDER — FLUOXETINE HYDROCHLORIDE 40 MG/1
40 CAPSULE ORAL DAILY
Qty: 90 CAPSULE | Refills: 0 | Status: SHIPPED | OUTPATIENT
Start: 2023-09-07

## 2023-09-07 RX ORDER — LAMOTRIGINE 25 MG/1
TABLET ORAL
Qty: 360 TABLET | Refills: 0 | Status: SHIPPED | OUTPATIENT
Start: 2023-09-07

## 2023-09-07 RX ORDER — CLONIDINE HYDROCHLORIDE 0.2 MG/1
0.2 TABLET ORAL NIGHTLY
Qty: 90 TABLET | Refills: 0 | Status: SHIPPED | OUTPATIENT
Start: 2023-09-07

## 2023-09-07 NOTE — PROGRESS NOTES
This provider is located at the [facility] (through [facility]), [address], [facility], [address] using a secure video visit through [employer]. Patient is being seen remotely via telehealth at their home address in [place], and stated they are in a secure environment for this session.   The patient's condition being consulted/diagnosed/treated is appropriate for telemedicine. The provider identified herself as well as her credentials.   The patient, and/or patients guardian, consent to be seen remotely, and when consent is given they understand that the consent allows for patient identifiable information to be sent to a third party as needed. They may refuse to be seen remotely at any time. The electronic data is encrypted and password protected, and the patient and/or guardian has been advised of the potential risks to privacy not withstanding such measures.   The use of a video visit has been reviewed with the patient and verbal informed consent has been obtained.   Patient identifiers used: Name and .    Subjective   Ronaldo Cantu is a 47 y.o. male who presents today for follow up    Chief Complaint:    Chief Complaint   Patient presents with    Anxiety    Depression    Med Management    Irritable        History of Present Illness:   History of Present Illness  Patient is a 47-year-old male presenting for 3 month follow-up related to depression, anxiety, and irritability.  Patient states he has been doing well, has experienced some heightened episodes of anxiety and irritability, he attributes environmental stressors to these conditions to include not receiving checks for payment from Worker's Compensation as well as a knee injury that occurred back in July.  Patient denies both depression and anxiety currently.  JACQUELINE score of 5 indicates mild anxiety.  PHQ score of 1 negative for depression.  Patient denies SI, HI, SIB, or hallucinations currently and is convincing.   "He continues to state \"I feel like I am over eating\" but denies unintentional weight gain.  Continues to struggle with concentration deficits which is concerning to him today, difficulty in tasks at work, also \"I cannot find where I put stuff.  Clonidine is not doing anything anymore.\"  Clonidine previously help with sleep, patient states he is able to initiate and stay asleep on his own now, sleeping approximately 7 to 8 hours nightly.  He believes his medications continue to be effective in addressing mood status, \"medicine has been pushed to the limits\" due to environmental factors. States he is late on a car payment and mortgage due to missing checks.     Patient resides in a home with his wife of 7 years whom he cites as supportive.  He has 1 biological child and 2 stepchildren, all adults.  He has a college degree in biology and works full-time at [employer] as a , thoroughly enjoys his job.  He states he plans to retire in March, this is a newer decision for him but he states he will remain busy with working for himself.  He states physically work has been challenging with his current health conditions and working as many hours as he does.  [employer] preference.  Enjoys golfing.  Denies drug or alcohol use. Denies situational changes since previous session. Currently working late today and was called in, apologizes for missing appointment.      The following portions of the patient's history were reviewed and updated as appropriate: allergies, current medications, past family history, past medical history, past social history, past surgical history and problem list.    Past Psychiatric History:  Began Treatment: 7 years ago for anger management  Diagnoses: unsure  Psychiatrist:Denies  Therapist: previously  Admission History:Denies  Medication Trials: effexor straterra (more irritable) and quelbree( suicidal)  Self Harm: Denies  Suicide Attempts:Denies   Psychosis, Anxiety, Depression:  " N/A    Past Medical History:  Past Medical History:   Diagnosis Date    Nephrolithiasis        Substance Abuse History:   Types:Denies all, including illicit  Withdrawal Symptoms:Denies  Longest Period Sober:Not Applicable   AA: Not applicable     Social History:  Social History     Socioeconomic History    Marital status:     Number of children: 3   Tobacco Use    Smoking status: Never    Smokeless tobacco: Never   Substance and Sexual Activity    Alcohol use: Yes     Alcohol/week: 1.0 standard drink     Types: 1 Cans of beer per week     Comment: 1 per day    Drug use: Never       Family History:  Family History   Problem Relation Age of Onset    Heart attack Mother     Suicide Attempts Sister     Cancer Maternal Grandmother     Lung cancer Paternal Grandfather        Past Surgical History:  Past Surgical History:   Procedure Laterality Date    APPENDECTOMY  09/2010    CARDIAC ABLATION  11/2019    CERVICAL FUSION  10/2012    KNEE ARTHROPLASTY, PARTIAL REPLACEMENT  12/2022    KNEE ARTHROSCOPY  05/2021    SHOULDER ARTHROSCOPY         Problem List:  Patient Active Problem List   Diagnosis    Chronic atrial fibrillation    Other hyperlipidemia    Primary osteoarthritis of both knees    Degeneration of lumbar or lumbosacral intervertebral disc    Toenail fungus    Bipolar affective disorder    Depression    Attention deficit hyperactivity disorder (ADHD)       Allergy:   Allergies   Allergen Reactions    Viloxazine Hcl Er Mental Status Change     Suicidal thoughts        Current Medications:   Current Outpatient Medications   Medication Sig Dispense Refill    cloNIDine (CATAPRES) 0.2 MG tablet Take 1 tablet by mouth Every Night. 90 tablet 0    FLUoxetine (PROzac) 40 MG capsule Take 1 capsule by mouth Daily. 90 capsule 0    lamoTRIgine (LaMICtal) 25 MG tablet Take 2 tabs by mouth (50mg) twice daily 360 tablet 0    atorvastatin (LIPITOR) 20 MG tablet TAKE 1 TABLET BY MOUTH EVERY DAY IN THE EVENING 90 tablet 3     tiZANidine (ZANAFLEX) 4 MG tablet        No current facility-administered medications for this visit.       Review of Systems:    Review of Systems   Constitutional: Negative.    HENT: Negative.     Eyes: Negative.    Respiratory: Negative.     Cardiovascular: Negative.    Gastrointestinal: Negative.    Endocrine: Negative.    Genitourinary: Negative.         Kidney stone   Musculoskeletal:  Positive for back pain.   Skin: Negative.    Allergic/Immunologic: Negative.    Neurological: Negative.  Negative for seizures.   Hematological: Negative.    Psychiatric/Behavioral:  Positive for decreased concentration, positive for hyperactivity and stress. The patient is nervous/anxious.        Physical Exam:   Physical Exam  Constitutional:       Appearance: Normal appearance. He is normal weight.   HENT:      Head: Normocephalic.      Nose: Nose normal.   Pulmonary:      Effort: Pulmonary effort is normal.   Musculoskeletal:         General: Normal range of motion.      Cervical back: Normal range of motion.   Neurological:      General: No focal deficit present.      Mental Status: He is alert and oriented to person, place, and time. Mental status is at baseline.   Psychiatric:         Attention and Perception: Attention and perception normal.         Mood and Affect: Affect normal. Mood is anxious.         Speech: Speech is rapid and pressured.         Behavior: Behavior normal. Behavior is cooperative.         Thought Content: Thought content normal.         Cognition and Memory: Cognition and memory normal.         Judgment: Judgment normal.   Vitals:  There were no vitals taken for this visit. There is no height or weight on file to calculate BMI.  Due to extenuating circumstances and possible current health risks associated with the patient being present in a clinical setting (with current health restrictions in place in regards to possible COVID 19 transmission/exposure), the patient was seen remotely today via a  [employer] Video Visit through [employer].  Unable to obtain vital signs due to nature of remote visit.  Height stated at 6ft1 inches.  Weight stated at 183 pounds.    Last 3 Blood Pressure Readings:  BP Readings from Last 3 Encounters:   06/19/23 122/68   01/16/23 114/68   01/03/23 104/78       Mental Status Exam:   Hygiene:   good  Cooperation:  Cooperative  Eye Contact:  Good  Psychomotor Behavior:  Appropriate  Affect:  Appropriate  Mood: normal  Hopelessness: Denies  Speech:  Normal  Thought Process:  Goal directed  Thought Content:  Normal  Suicidal:  None  Homicidal:  None  Hallucinations:  None  Delusion:  None  Memory:  Intact  Orientation:  Grossly intact  Reliability:  good  Insight:  Good  Judgement:  Fair  Impulse Control:  Fair  Physical/Medical Issues:   back surgeries, knee surgeries, cardiac ablation 2019        Lab Results:   Office Visit on 06/19/2023   Component Date Value Ref Range Status    WBC 06/19/2023 7.12  3.40 - 10.80 10*3/mm3 Final    RBC 06/19/2023 4.85  4.14 - 5.80 10*6/mm3 Final    Hemoglobin 06/19/2023 14.3  13.0 - 17.7 g/dL Final    Hematocrit 06/19/2023 42.0  37.5 - 51.0 % Final    MCV 06/19/2023 86.6  79.0 - 97.0 fL Final    MCH 06/19/2023 29.5  26.6 - 33.0 pg Final    MCHC 06/19/2023 34.0  31.5 - 35.7 g/dL Final    RDW 06/19/2023 13.1  12.3 - 15.4 % Final    RDW-SD 06/19/2023 41.0  37.0 - 54.0 fl Final    MPV 06/19/2023 11.1  6.0 - 12.0 fL Final    Platelets 06/19/2023 183  140 - 450 10*3/mm3 Final    Glucose 06/19/2023 91  65 - 99 mg/dL Final    BUN 06/19/2023 16  6 - 20 mg/dL Final    Creatinine 06/19/2023 1.17  0.76 - 1.27 mg/dL Final    Sodium 06/19/2023 143  136 - 145 mmol/L Final    Potassium 06/19/2023 4.3  3.5 - 5.2 mmol/L Final    Chloride 06/19/2023 105  98 - 107 mmol/L Final    CO2 06/19/2023 28.0  22.0 - 29.0 mmol/L Final    Calcium 06/19/2023 10.2  8.6 - 10.5 mg/dL Final    Total Protein 06/19/2023 7.3  6.0 - 8.5 g/dL Final    Albumin 06/19/2023 4.8  3.5 - 5.2 g/dL Final     ALT (SGPT) 06/19/2023 27  1 - 41 U/L Final    AST (SGOT) 06/19/2023 24  1 - 40 U/L Final    Alkaline Phosphatase 06/19/2023 79  39 - 117 U/L Final    Total Bilirubin 06/19/2023 0.3  0.0 - 1.2 mg/dL Final    Globulin 06/19/2023 2.5  gm/dL Final    A/G Ratio 06/19/2023 1.9  g/dL Final    BUN/Creatinine Ratio 06/19/2023 13.7  7.0 - 25.0 Final    Anion Gap 06/19/2023 10.0  5.0 - 15.0 mmol/L Final    eGFR 06/19/2023 76.4  >60.0 mL/min/1.73 Final    TSH 06/19/2023 3.430  0.270 - 4.200 uIU/mL Final    Vitamin B-12 06/19/2023 403  211 - 946 pg/mL Final         Assessment & Plan   Problems Addressed this Visit    None  Visit Diagnoses       Bipolar affective disorder, mixed    -  Primary    Relevant Medications    FLUoxetine (PROzac) 40 MG capsule    lamoTRIgine (LaMICtal) 25 MG tablet    Concentration deficit        Relevant Medications    cloNIDine (CATAPRES) 0.2 MG tablet    Seasonal depression        Relevant Medications    FLUoxetine (PROzac) 40 MG capsule    Medication management        Relevant Medications    cloNIDine (CATAPRES) 0.2 MG tablet    FLUoxetine (PROzac) 40 MG capsule    lamoTRIgine (LaMICtal) 25 MG tablet          Diagnoses         Codes Comments    Bipolar affective disorder, mixed    -  Primary ICD-10-CM: F31.60  ICD-9-CM: 296.60     Concentration deficit     ICD-10-CM: R41.840  ICD-9-CM: 799.51     Seasonal depression     ICD-10-CM: F33.8  ICD-9-CM: 296.99     Medication management     ICD-10-CM: Z79.899  ICD-9-CM: V58.69             Visit Diagnoses:    ICD-10-CM ICD-9-CM   1. Bipolar affective disorder, mixed  F31.60 296.60   2. Concentration deficit  R41.840 799.51   3. Seasonal depression  F33.8 296.99   4. Medication management  Z79.899 V58.69     Prozac and Lamictal refilled at this time.  Clonidine increased to 0.2 nightly.  Previously educated upon side effects with use of these medications as well as when to present for emergency services, denies at this time.  Follow up in 1 month or sooner if  needed.      GOALS:  Short Term Goals: Patient will be compliant with medication, and patient will have no significant medication related side effects.  Patient will be engaged in psychotherapy as indicated.  Patient will report subjective improvement of symptoms.  Long term goals: To stabilize mood and treat/improve subjective symptoms, the patient will stay out of the hospital, the patient will be at an optimal level of functioning, and the patient will take all medications as prescribed.  The patient/guardian verbalized understanding and agreement with goals that were mutually set.      TREATMENT PLAN: Continue supportive psychotherapy efforts and medications as indicated.  Pharmacological and Non-Pharmacological treatment options discussed during today's visit. Patient/Guardian acknowledged and verbally consented with current treatment plan and was educated on the importance of compliance with treatment and follow-up appointments.      MEDICATION ISSUES:  Discussed medication options and treatment plan of prescribed medication as well as the risks, benefits, any black box warnings, and side effects including potential falls, possible impaired driving, and metabolic adversities among others. Patient is agreeable to call the office with any worsening of symptoms or onset of side effects, or if any concerns or questions arise.  The contact information for the office is made available to the patient. Patient is agreeable to call 911 or go to the nearest ER should they begin having any SI/HI, or if any urgent concerns arise. No medication side effects or related complaints today.     MEDS ORDERED DURING VISIT:  New Medications Ordered This Visit   Medications    cloNIDine (CATAPRES) 0.2 MG tablet     Sig: Take 1 tablet by mouth Every Night.     Dispense:  90 tablet     Refill:  0    FLUoxetine (PROzac) 40 MG capsule     Sig: Take 1 capsule by mouth Daily.     Dispense:  90 capsule     Refill:  0    lamoTRIgine  (LaMICtal) 25 MG tablet     Sig: Take 2 tabs by mouth (50mg) twice daily     Dispense:  360 tablet     Refill:  0       Follow Up Appointment:   Return in about 4 weeks (around 10/5/2023) for Recheck.             This document has been electronically signed by DEVEN Murillo  September 7, 2023 14:56 EDT    Some of the data in this electronic note has been brought forward from a previous encounter, any necessary changes have been made, it has been reviewed by this APRN, and it is accurate.    Dictated Utilizing Dragon Dictation: Part of this note may be an electronic transcription/translation of spoken language to printed text using the Dragon Dictation System.

## 2023-10-09 ENCOUNTER — TELEMEDICINE (OUTPATIENT)
Dept: PSYCHIATRY | Facility: CLINIC | Age: 48
End: 2023-10-09
Payer: COMMERCIAL

## 2023-10-09 DIAGNOSIS — Z79.899 MEDICATION MANAGEMENT: ICD-10-CM

## 2023-10-09 DIAGNOSIS — F31.60 BIPOLAR AFFECTIVE DISORDER, MIXED: Primary | ICD-10-CM

## 2023-10-09 DIAGNOSIS — F33.8 SEASONAL DEPRESSION: ICD-10-CM

## 2023-10-09 DIAGNOSIS — R41.840 CONCENTRATION DEFICIT: ICD-10-CM

## 2023-10-09 PROCEDURE — 99214 OFFICE O/P EST MOD 30 MIN: CPT

## 2023-10-09 RX ORDER — BUPROPION HYDROCHLORIDE 75 MG/1
75 TABLET ORAL 2 TIMES DAILY
Qty: 60 TABLET | Refills: 0 | Status: SHIPPED | OUTPATIENT
Start: 2023-10-09

## 2023-10-09 NOTE — PROGRESS NOTES
"This provider is located at the [facility] (through [facility]), [address], [place], [address] using a secure video visit through [employer]. Patient is being seen remotely via telehealth at their home address in [place], and stated they are in a secure environment for this session.   The patient's condition being consulted/diagnosed/treated is appropriate for telemedicine. The provider identified herself as well as her credentials.   The patient, and/or patients guardian, consent to be seen remotely, and when consent is given they understand that the consent allows for patient identifiable information to be sent to a third party as needed. They may refuse to be seen remotely at any time. The electronic data is encrypted and password protected, and the patient and/or guardian has been advised of the potential risks to privacy not withstanding such measures.   The use of a video visit has been reviewed with the patient and verbal informed consent has been obtained.   Patient identifiers used: Name and .    Subjective   Ronaldo Cantu is a 47 y.o. male who presents today for follow up    Chief Complaint:    Chief Complaint   Patient presents with    Anxiety    Depression    Med Management    concentration        History of Present Illness:   History of Present Illness  Patient is a 47-year-old male presenting for 1 month follow-up related to depression, anxiety, irritability and concentration deficits.  Patient continues to voice compliance with medications, only side effect experienced is \"dry mouth extremely bad\" in regards to increase to clonidine.  He states this is excessive, wakes him up and now the night and makes it difficult to reinitiate sleep.  Previously helped with sleep but does not appear to currently be doing so.  Has not noticed any improvement in concentration with use of this medication.  This remains his largest concern today as he acknowledges " "struggling at [employer] and difficulty holding conversation.  He states \"my mind will not slow down, asking over things.  I was in [employer] trying to pray and my mind was somewhere else.\"  When asked about his mood \"it is fantastic.\"  He denies SI, HI, SIB, or hallucinations currently and is convincing JACQUELINE performed at this time and reduced from 5-0, negative for anxiety which patient currently rates a 0/10.  PHQ performed as well and increased from 1 to 3, patient rates depression a 0/10. Most of this score was related to concentration deficits.  Patient states he continues to feel he is overeating but spouse states he is not.    Patient resides in a home with his wife of 7 years whom he cites as supportive.  He has 1 biological child and 2 stepchildren, all adults.  He has a college degree in biology and works full-time at [employer] as a , thoroughly enjoys his job.  He states he plans to retire in March, this is a newer decision for him but he states he will remain busy with working for himself.  He states physically work has been challenging with his current health conditions and working as many hours as he does.  [employer] preference.  Enjoys golfing.  Denies drug or alcohol use. Denies situational changes since previous session. Denies situational or medical status changes.       The following portions of the patient's history were reviewed and updated as appropriate: allergies, current medications, past family history, past medical history, past social history, past surgical history and problem list.    Past Psychiatric History:  Began Treatment: 7 years ago for anger management  Diagnoses: unsure  Psychiatrist:Denies  Therapist: previously  Admission History:Denies  Medication Trials: effexor straterra (more irritable) and quelbree( suicidal), clonidine (increased thirst)  Self Harm: Denies  Suicide Attempts:Denies   Psychosis, Anxiety, Depression:  N/A    Past Medical History:  Past Medical " History:   Diagnosis Date    Nephrolithiasis        Substance Abuse History:   Types:Denies all, including illicit  Withdrawal Symptoms:Denies  Longest Period Sober:Not Applicable   AA: Not applicable     Social History:  Social History     Socioeconomic History    Marital status:     Number of children: 3   Tobacco Use    Smoking status: Never    Smokeless tobacco: Never   Substance and Sexual Activity    Alcohol use: Yes     Alcohol/week: 1.0 standard drink of alcohol     Types: 1 Cans of beer per week     Comment: 1 per day    Drug use: Never       Family History:  Family History   Problem Relation Age of Onset    Heart attack Mother     Suicide Attempts Sister     Cancer Maternal Grandmother     Lung cancer Paternal Grandfather        Past Surgical History:  Past Surgical History:   Procedure Laterality Date    APPENDECTOMY  09/2010    CARDIAC ABLATION  11/2019    CERVICAL FUSION  10/2012    KNEE ARTHROPLASTY, PARTIAL REPLACEMENT  12/2022    KNEE ARTHROSCOPY  05/2021    SHOULDER ARTHROSCOPY         Problem List:  Patient Active Problem List   Diagnosis    Chronic atrial fibrillation    Other hyperlipidemia    Primary osteoarthritis of both knees    Degeneration of lumbar or lumbosacral intervertebral disc    Toenail fungus    Bipolar affective disorder    Depression    Attention deficit hyperactivity disorder (ADHD)       Allergy:   Allergies   Allergen Reactions    Viloxazine Hcl Er Mental Status Change     Suicidal thoughts        Current Medications:   Current Outpatient Medications   Medication Sig Dispense Refill    atorvastatin (LIPITOR) 20 MG tablet TAKE 1 TABLET BY MOUTH EVERY DAY IN THE EVENING 90 tablet 3    buPROPion (WELLBUTRIN) 75 MG tablet Take 1 tablet by mouth 2 (Two) Times a Day. 60 tablet 0    FLUoxetine (PROzac) 40 MG capsule Take 1 capsule by mouth Daily. 90 capsule 0    lamoTRIgine (LaMICtal) 25 MG tablet Take 2 tabs by mouth (50mg) twice daily 360 tablet 0    tiZANidine (ZANAFLEX) 4  MG tablet        No current facility-administered medications for this visit.       Review of Systems:    Review of Systems   Constitutional: Negative.    HENT: Negative.     Eyes: Negative.    Respiratory: Negative.     Cardiovascular: Negative.    Gastrointestinal: Negative.    Endocrine: Negative.    Genitourinary: Negative.         Kidney stone   Musculoskeletal:  Positive for back pain.   Skin: Negative.    Allergic/Immunologic: Negative.    Neurological: Negative.  Negative for seizures.   Hematological: Negative.    Psychiatric/Behavioral:  Positive for decreased concentration and positive for hyperactivity. The patient is nervous/anxious.          Physical Exam:   Physical Exam  Constitutional:       Appearance: Normal appearance. He is normal weight.   HENT:      Head: Normocephalic.      Nose: Nose normal.   Pulmonary:      Effort: Pulmonary effort is normal.   Musculoskeletal:         General: Normal range of motion.      Cervical back: Normal range of motion.   Neurological:      General: No focal deficit present.      Mental Status: He is alert and oriented to person, place, and time. Mental status is at baseline.   Psychiatric:         Attention and Perception: Attention and perception normal.         Mood and Affect: Affect normal. Mood is anxious.         Speech: Speech is rapid and pressured.         Behavior: Behavior normal. Behavior is cooperative.         Thought Content: Thought content normal.         Cognition and Memory: Cognition and memory normal.         Judgment: Judgment normal.     Vitals:  There were no vitals taken for this visit. There is no height or weight on file to calculate BMI.  Due to extenuating circumstances and possible current health risks associated with the patient being present in a clinical setting (with current health restrictions in place in regards to possible COVID 19 transmission/exposure), the patient was seen remotely today [facility] through  [employer].  Unable to obtain vital signs due to nature of remote visit.  Height stated at 6ft1 inches.  Weight stated at 183 pounds.    Last 3 Blood Pressure Readings:  BP Readings from Last 3 Encounters:   06/19/23 122/68   01/16/23 114/68   01/03/23 104/78     PHQ-9 Depression Screening  Little interest or pleasure in doing things? 0-->not at all   Feeling down, depressed, or hopeless? 0-->not at all   Trouble falling or staying asleep, or sleeping too much? 0-->not at all   Feeling tired or having little energy? 0-->not at all   Poor appetite or overeating? 0-->not at all   Feeling bad about yourself - or that you are a failure or have let yourself or your family down? 0-->not at all   Trouble concentrating on things, such as reading the newspaper or watching television? 3-->nearly every day   Moving or speaking so slowly that other people could have noticed? Or the opposite - being so fidgety or restless that you have been moving around a lot more than usual? 0-->not at all   Thoughts that you would be better off dead, or of hurting yourself in some way? 0-->not at all   PHQ-9 Total Score 3   If you checked off any problems, how difficult have these problems made it for you to do your work, take care of things at home, or get along with other people? not difficult at all           Mental Status Exam:   Hygiene:   good  Cooperation:  Cooperative  Eye Contact:  Good  Psychomotor Behavior:  Appropriate  Affect:  Appropriate  Mood: normal  Hopelessness: Denies  Speech:  Normal  Thought Process:  Goal directed  Thought Content:  Normal  Suicidal:  None  Homicidal:  None  Hallucinations:  None  Delusion:  None  Memory:  Intact  Orientation:  Grossly intact  Reliability:  good  Insight:  Good  Judgement:  Fair  Impulse Control:  Fair  Physical/Medical Issues:   back surgeries, knee surgeries, cardiac ablation 2019        Lab Results:   Office Visit on 06/19/2023   Component Date Value Ref Range Status    WBC 06/19/2023 7.12   3.40 - 10.80 10*3/mm3 Final    RBC 06/19/2023 4.85  4.14 - 5.80 10*6/mm3 Final    Hemoglobin 06/19/2023 14.3  13.0 - 17.7 g/dL Final    Hematocrit 06/19/2023 42.0  37.5 - 51.0 % Final    MCV 06/19/2023 86.6  79.0 - 97.0 fL Final    MCH 06/19/2023 29.5  26.6 - 33.0 pg Final    MCHC 06/19/2023 34.0  31.5 - 35.7 g/dL Final    RDW 06/19/2023 13.1  12.3 - 15.4 % Final    RDW-SD 06/19/2023 41.0  37.0 - 54.0 fl Final    MPV 06/19/2023 11.1  6.0 - 12.0 fL Final    Platelets 06/19/2023 183  140 - 450 10*3/mm3 Final    Glucose 06/19/2023 91  65 - 99 mg/dL Final    BUN 06/19/2023 16  6 - 20 mg/dL Final    Creatinine 06/19/2023 1.17  0.76 - 1.27 mg/dL Final    Sodium 06/19/2023 143  136 - 145 mmol/L Final    Potassium 06/19/2023 4.3  3.5 - 5.2 mmol/L Final    Chloride 06/19/2023 105  98 - 107 mmol/L Final    CO2 06/19/2023 28.0  22.0 - 29.0 mmol/L Final    Calcium 06/19/2023 10.2  8.6 - 10.5 mg/dL Final    Total Protein 06/19/2023 7.3  6.0 - 8.5 g/dL Final    Albumin 06/19/2023 4.8  3.5 - 5.2 g/dL Final    ALT (SGPT) 06/19/2023 27  1 - 41 U/L Final    AST (SGOT) 06/19/2023 24  1 - 40 U/L Final    Alkaline Phosphatase 06/19/2023 79  39 - 117 U/L Final    Total Bilirubin 06/19/2023 0.3  0.0 - 1.2 mg/dL Final    Globulin 06/19/2023 2.5  gm/dL Final    A/G Ratio 06/19/2023 1.9  g/dL Final    BUN/Creatinine Ratio 06/19/2023 13.7  7.0 - 25.0 Final    Anion Gap 06/19/2023 10.0  5.0 - 15.0 mmol/L Final    eGFR 06/19/2023 76.4  >60.0 mL/min/1.73 Final    TSH 06/19/2023 3.430  0.270 - 4.200 uIU/mL Final    Vitamin B-12 06/19/2023 403  211 - 946 pg/mL Final         Assessment & Plan   Problems Addressed this Visit    None  Visit Diagnoses       Bipolar affective disorder, mixed    -  Primary    Relevant Medications    buPROPion (WELLBUTRIN) 75 MG tablet    Seasonal depression        Relevant Medications    buPROPion (WELLBUTRIN) 75 MG tablet    Concentration deficit        Relevant Medications    buPROPion (WELLBUTRIN) 75 MG tablet     Medication management        Relevant Medications    buPROPion (WELLBUTRIN) 75 MG tablet          Diagnoses         Codes Comments    Bipolar affective disorder, mixed    -  Primary ICD-10-CM: F31.60  ICD-9-CM: 296.60     Seasonal depression     ICD-10-CM: F33.8  ICD-9-CM: 296.99     Concentration deficit     ICD-10-CM: R41.840  ICD-9-CM: 799.51     Medication management     ICD-10-CM: Z79.899  ICD-9-CM: V58.69             Visit Diagnoses:    ICD-10-CM ICD-9-CM   1. Bipolar affective disorder, mixed  F31.60 296.60   2. Seasonal depression  F33.8 296.99   3. Concentration deficit  R41.840 799.51   4. Medication management  Z79.899 V58.69       Prozac and Lamictal do not need refills at this time.  Clonidine discontinued due to side effects. Wellbutrin 75 mg BID initiated. Patient is educated upon risks versus benefits as well as side effects and when to seek care in an emergency setting.  Patient verbalized understanding.  Discussed monitoring for mood and irritability changes, verbalized understanding.  Follow up in 4 weeks or sooner if needed.    GOALS:  Short Term Goals: Patient will be compliant with medication, and patient will have no significant medication related side effects.  Patient will be engaged in psychotherapy as indicated.  Patient will report subjective improvement of symptoms.  Long term goals: To stabilize mood and treat/improve subjective symptoms, the patient will stay out of the hospital, the patient will be at an optimal level of functioning, and the patient will take all medications as prescribed.  The patient/guardian verbalized understanding and agreement with goals that were mutually set.      TREATMENT PLAN: Continue supportive psychotherapy efforts and medications as indicated.  Pharmacological and Non-Pharmacological treatment options discussed during today's visit. Patient/Guardian acknowledged and verbally consented with current treatment plan and was educated on the importance of  compliance with treatment and follow-up appointments.      MEDICATION ISSUES:  Discussed medication options and treatment plan of prescribed medication as well as the risks, benefits, any black box warnings, and side effects including potential falls, possible impaired driving, and metabolic adversities among others. Patient is agreeable to call the office with any worsening of symptoms or onset of side effects, or if any concerns or questions arise.  The contact information for the office is made available to the patient. Patient is agreeable to call 911 or go to the nearest ER should they begin having any SI/HI, or if any urgent concerns arise. No medication side effects or related complaints today.     MEDS ORDERED DURING VISIT:  New Medications Ordered This Visit   Medications    buPROPion (WELLBUTRIN) 75 MG tablet     Sig: Take 1 tablet by mouth 2 (Two) Times a Day.     Dispense:  60 tablet     Refill:  0       Follow Up Appointment:   Return in about 4 weeks (around 11/6/2023) for Recheck.             This document has been electronically signed by DEVEN Murillo  October 9, 2023 10:58 EDT    Some of the data in this electronic note has been brought forward from a previous encounter, any necessary changes have been made, it has been reviewed by this APRN, and it is accurate.    Dictated Utilizing Dragon Dictation: Part of this note may be an electronic transcription/translation of spoken language to printed text using the Dragon Dictation System.

## 2023-10-09 NOTE — TREATMENT PLAN
Multi-Disciplinary Problems (from Behavioral Health Treatment Plan)      Active Problems       Problem: Anxiety  Start Date: 10/09/23      Problem Details: The patient self-scales this problem as a 0 with 10 being the worst.          Goal Priority Start Date Expected End Date End Date    Patient will develop and implement behavioral and cognitive strategies to reduce anxiety and irrational fears. -- 10/09/23 -- --    Goal Details: Progress toward goal:  Not appropriate to rate progress toward goal since this is the initial treatment plan.          Goal Intervention Frequency Start Date End Date    Help patient explore past emotional issues in relation to present anxiety. PRN 10/09/23 --    Intervention Details: Duration of treatment until until discharged.          Goal Intervention Frequency Start Date End Date    Help patient develop an awareness of their cognitive and physical responses to anxiety. PRN 10/09/23 --    Intervention Details: Duration of treatment until until discharged.                  Problem: Depression  Start Date: 10/09/23      Problem Details: The patient self-scales this problem as a 0 with 10 being the worst.          Goal Priority Start Date Expected End Date End Date    Patient will demonstrate the ability to initiate new constructive life skills outside of sessions on a consistent basis. -- 10/09/23 -- --    Goal Details: Progress toward goal:  Not appropriate to rate progress toward goal since this is the initial treatment plan.          Goal Intervention Frequency Start Date End Date    Assist patient in setting attainable activities of daily living goals. PRN 10/09/23 --      Goal Intervention Frequency Start Date End Date    Provide education about depression PRN 10/09/23 --    Intervention Details: Duration of treatment until until discharged.          Goal Intervention Frequency Start Date End Date    Assist patient in developing healthy coping strategies. PRN 10/09/23 --     Intervention Details: Duration of treatment until until discharged.                  Problem: Mood Instability  Start Date: 10/09/23      Problem Details: The patient self-scales this problem as a 0 with 10 being the worst.          Goal Priority Start Date Expected End Date End Date    Patient will achieve mood stability as evidenced by controlled behavior and a more deliberate thought process -- 10/09/23 -- --    Goal Details: Progress toward goal:  Not appropriate to rate progress toward goal since this is the initial treatment plan.          Goal Intervention Frequency Start Date End Date    Provide structure and focus to patient's thoughts and actions by establishing plans and routine. PRN 10/09/23 --    Intervention Details: Duration of treatment until until discharged.          Goal Intervention Frequency Start Date End Date    Assist patient in setting responsible goals and limits in behavior. PRN 10/09/23 --    Intervention Details: Duration of treatment until until discharged.                                 I have discussed and reviewed this treatment plan with the patient and/or guardian.  The patient has verbally agreed with this treatment plan (no signatures are obtained at today's visit as the patient is a telehealth patient and is unable to print and sign this document, therefore verbal agreement is obtained).

## 2023-10-09 NOTE — PROGRESS NOTES
"This provider is located at the [facility] (through [facility]), [address], [facility], [address] using a secure video visit through [employer]. Patient is being seen remotely via telehealth at their home address in [place], and stated they are in a secure environment for this session.   The patient's condition being consulted/diagnosed/treated is appropriate for telemedicine. The provider identified herself as well as her credentials.   The patient, and/or patients guardian, consent to be seen remotely, and when consent is given they understand that the consent allows for patient identifiable information to be sent to a third party as needed. They may refuse to be seen remotely at any time. The electronic data is encrypted and password protected, and the patient and/or guardian has been advised of the potential risks to privacy not withstanding such measures.   The use of a video visit has been reviewed with the patient and verbal informed consent has been obtained.   Patient identifiers used: Name and .    Subjective   Ronaldo Cantu is a 47 y.o. male who presents today for follow up    Chief Complaint:    Chief Complaint   Patient presents with    Anxiety    Depression    Med Management    concentration        History of Present Illness:   History of Present Illness  Patient is a 47-year-old male presenting for 1 month follow-up related to depression, anxiety, irritability and concentration deficits.  Patient continues to voice compliance with medications, only side effect experienced is \"dry mouth extremely bad\" in regards to increase to clonidine.  He states this is excessive, wakes him up and now the night and makes it difficult to reinitiate sleep.  Previously helped with sleep but does not appear to currently be doing so.  Has not noticed any improvement in concentration with use of this medication.  This remains his largest concern today as he acknowledges " "struggling at [employer] and difficulty holding conversation.  He states \"my mind will not slow down, asking over things.  I was in [employer] trying to pray and my mind was somewhere else.\"  When asked about his mood \"it is fantastic.\"  He denies SI, HI, SIB, or hallucinations currently and is convincing JACQUELINE performed at this time and reduced from 5-0, negative for anxiety which patient currently rates a 0/10.  PHQ performed as well and increased from 1 to 3, patient rates depression a 0/10. Most of this score was related to concentration deficits.  Patient states he continues to feel he is overeating but spouse states he is not.    Patient resides in a home with his wife of 7 years whom he cites as supportive.  He has 1 biological child and 2 stepchildren, all adults.  He has a college degree in biology and works full-time at [employer] as a , thoroughly enjoys his job.  He states he plans to retire in March, this is a newer decision for him but he states he will remain busy with working for himself.  He states physically work has been challenging with his current health conditions and working as many hours as he does.  Jehov[employer] preference.  Enjoys golfing.  Denies drug or alcohol use. Denies situational changes since previous session. Denies situational or medical status changes.       The following portions of the patient's history were reviewed and updated as appropriate: allergies, current medications, past family history, past medical history, past social history, past surgical history and problem list.    Past Psychiatric History:  Began Treatment: 7 years ago for anger management  Diagnoses: unsure  Psychiatrist:Denies  Therapist: previously  Admission History:Denies  Medication Trials: effexor straterra (more irritable) and quelbree( suicidal), clonidine (increased thirst)  Self Harm: Denies  Suicide Attempts:Denies   Psychosis, Anxiety, Depression:  N/A    Past Medical History:  Past Medical " History:   Diagnosis Date    Nephrolithiasis        Substance Abuse History:   Types:Denies all, including illicit  Withdrawal Symptoms:Denies  Longest Period Sober:Not Applicable   AA: Not applicable     Social History:  Social History     Socioeconomic History    Marital status:     Number of children: 3   Tobacco Use    Smoking status: Never    Smokeless tobacco: Never   Substance and Sexual Activity    Alcohol use: Yes     Alcohol/week: 1.0 standard drink of alcohol     Types: 1 Cans of beer per week     Comment: 1 per day    Drug use: Never       Family History:  Family History   Problem Relation Age of Onset    Heart attack Mother     Suicide Attempts Sister     Cancer Maternal Grandmother     Lung cancer Paternal Grandfather        Past Surgical History:  Past Surgical History:   Procedure Laterality Date    APPENDECTOMY  09/2010    CARDIAC ABLATION  11/2019    CERVICAL FUSION  10/2012    KNEE ARTHROPLASTY, PARTIAL REPLACEMENT  12/2022    KNEE ARTHROSCOPY  05/2021    SHOULDER ARTHROSCOPY         Problem List:  Patient Active Problem List   Diagnosis    Chronic atrial fibrillation    Other hyperlipidemia    Primary osteoarthritis of both knees    Degeneration of lumbar or lumbosacral intervertebral disc    Toenail fungus    Bipolar affective disorder    Depression    Attention deficit hyperactivity disorder (ADHD)       Allergy:   Allergies   Allergen Reactions    Viloxazine Hcl Er Mental Status Change     Suicidal thoughts        Current Medications:   Current Outpatient Medications   Medication Sig Dispense Refill    atorvastatin (LIPITOR) 20 MG tablet TAKE 1 TABLET BY MOUTH EVERY DAY IN THE EVENING 90 tablet 3    buPROPion (WELLBUTRIN) 75 MG tablet Take 1 tablet by mouth 2 (Two) Times a Day. 60 tablet 0    FLUoxetine (PROzac) 40 MG capsule Take 1 capsule by mouth Daily. 90 capsule 0    lamoTRIgine (LaMICtal) 25 MG tablet Take 2 tabs by mouth (50mg) twice daily 360 tablet 0    tiZANidine (ZANAFLEX) 4  MG tablet        No current facility-administered medications for this visit.       Review of Systems:    Review of Systems   Constitutional: Negative.    HENT: Negative.     Eyes: Negative.    Respiratory: Negative.     Cardiovascular: Negative.    Gastrointestinal: Negative.    Endocrine: Negative.    Genitourinary: Negative.         Kidney stone   Musculoskeletal:  Positive for back pain.   Skin: Negative.    Allergic/Immunologic: Negative.    Neurological: Negative.  Negative for seizures.   Hematological: Negative.    Psychiatric/Behavioral:  Positive for decreased concentration and positive for hyperactivity. The patient is nervous/anxious.          Physical Exam:   Physical Exam  Constitutional:       Appearance: Normal appearance. He is normal weight.   HENT:      Head: Normocephalic.      Nose: Nose normal.   Pulmonary:      Effort: Pulmonary effort is normal.   Musculoskeletal:         General: Normal range of motion.      Cervical back: Normal range of motion.   Neurological:      General: No focal deficit present.      Mental Status: He is alert and oriented to person, place, and time. Mental status is at baseline.   Psychiatric:         Attention and Perception: Attention and perception normal.         Mood and Affect: Affect normal. Mood is anxious.         Speech: Speech is rapid and pressured.         Behavior: Behavior normal. Behavior is cooperative.         Thought Content: Thought content normal.         Cognition and Memory: Cognition and memory normal.         Judgment: Judgment normal.     Vitals:  There were no vitals taken for this visit. There is no height or weight on file to calculate BMI.  Due to extenuating circumstances and possible current health risks associated with the patient being present in a clinical setting (with current health restrictions in place in regards to possible COVID 19 transmission/exposure), the patient was seen remotely today [facility]  [employer].  Unable to obtain vital signs due to nature of remote visit.  Height stated at 6ft1 inches.  Weight stated at 183 pounds.    Last 3 Blood Pressure Readings:  BP Readings from Last 3 Encounters:   06/19/23 122/68   01/16/23 114/68   01/03/23 104/78     PHQ-9 Depression Screening  Little interest or pleasure in doing things? 0-->not at all   Feeling down, depressed, or hopeless? 0-->not at all   Trouble falling or staying asleep, or sleeping too much? 0-->not at all   Feeling tired or having little energy? 0-->not at all   Poor appetite or overeating? 0-->not at all   Feeling bad about yourself - or that you are a failure or have let yourself or your family down? 0-->not at all   Trouble concentrating on things, such as reading the newspaper or watching television? 3-->nearly every day   Moving or speaking so slowly that other people could have noticed? Or the opposite - being so fidgety or restless that you have been moving around a lot more than usual? 0-->not at all   Thoughts that you would be better off dead, or of hurting yourself in some way? 0-->not at all   PHQ-9 Total Score 3   If you checked off any problems, how difficult have these problems made it for you to do your work, take care of things at home, or get along with other people? not difficult at all           Mental Status Exam:   Hygiene:   good  Cooperation:  Cooperative  Eye Contact:  Good  Psychomotor Behavior:  Appropriate  Affect:  Appropriate  Mood: normal  Hopelessness: Denies  Speech:  Normal  Thought Process:  Goal directed  Thought Content:  Normal  Suicidal:  None  Homicidal:  None  Hallucinations:  None  Delusion:  None  Memory:  Intact  Orientation:  Grossly intact  Reliability:  good  Insight:  Good  Judgement:  Fair  Impulse Control:  Fair  Physical/Medical Issues:   back surgeries, knee surgeries, cardiac ablation 2019        Lab Results:   Office Visit on 06/19/2023   Component Date Value Ref Range Status    WBC 06/19/2023 7.12   3.40 - 10.80 10*3/mm3 Final    RBC 06/19/2023 4.85  4.14 - 5.80 10*6/mm3 Final    Hemoglobin 06/19/2023 14.3  13.0 - 17.7 g/dL Final    Hematocrit 06/19/2023 42.0  37.5 - 51.0 % Final    MCV 06/19/2023 86.6  79.0 - 97.0 fL Final    MCH 06/19/2023 29.5  26.6 - 33.0 pg Final    MCHC 06/19/2023 34.0  31.5 - 35.7 g/dL Final    RDW 06/19/2023 13.1  12.3 - 15.4 % Final    RDW-SD 06/19/2023 41.0  37.0 - 54.0 fl Final    MPV 06/19/2023 11.1  6.0 - 12.0 fL Final    Platelets 06/19/2023 183  140 - 450 10*3/mm3 Final    Glucose 06/19/2023 91  65 - 99 mg/dL Final    BUN 06/19/2023 16  6 - 20 mg/dL Final    Creatinine 06/19/2023 1.17  0.76 - 1.27 mg/dL Final    Sodium 06/19/2023 143  136 - 145 mmol/L Final    Potassium 06/19/2023 4.3  3.5 - 5.2 mmol/L Final    Chloride 06/19/2023 105  98 - 107 mmol/L Final    CO2 06/19/2023 28.0  22.0 - 29.0 mmol/L Final    Calcium 06/19/2023 10.2  8.6 - 10.5 mg/dL Final    Total Protein 06/19/2023 7.3  6.0 - 8.5 g/dL Final    Albumin 06/19/2023 4.8  3.5 - 5.2 g/dL Final    ALT (SGPT) 06/19/2023 27  1 - 41 U/L Final    AST (SGOT) 06/19/2023 24  1 - 40 U/L Final    Alkaline Phosphatase 06/19/2023 79  39 - 117 U/L Final    Total Bilirubin 06/19/2023 0.3  0.0 - 1.2 mg/dL Final    Globulin 06/19/2023 2.5  gm/dL Final    A/G Ratio 06/19/2023 1.9  g/dL Final    BUN/Creatinine Ratio 06/19/2023 13.7  7.0 - 25.0 Final    Anion Gap 06/19/2023 10.0  5.0 - 15.0 mmol/L Final    eGFR 06/19/2023 76.4  >60.0 mL/min/1.73 Final    TSH 06/19/2023 3.430  0.270 - 4.200 uIU/mL Final    Vitamin B-12 06/19/2023 403  211 - 946 pg/mL Final         Assessment & Plan   Problems Addressed this Visit    None  Visit Diagnoses       Bipolar affective disorder, mixed    -  Primary    Relevant Medications    buPROPion (WELLBUTRIN) 75 MG tablet    Seasonal depression        Relevant Medications    buPROPion (WELLBUTRIN) 75 MG tablet    Concentration deficit        Relevant Medications    buPROPion (WELLBUTRIN) 75 MG tablet     Medication management        Relevant Medications    buPROPion (WELLBUTRIN) 75 MG tablet          Diagnoses         Codes Comments    Bipolar affective disorder, mixed    -  Primary ICD-10-CM: F31.60  ICD-9-CM: 296.60     Seasonal depression     ICD-10-CM: F33.8  ICD-9-CM: 296.99     Concentration deficit     ICD-10-CM: R41.840  ICD-9-CM: 799.51     Medication management     ICD-10-CM: Z79.899  ICD-9-CM: V58.69             Visit Diagnoses:    ICD-10-CM ICD-9-CM   1. Bipolar affective disorder, mixed  F31.60 296.60   2. Seasonal depression  F33.8 296.99   3. Concentration deficit  R41.840 799.51   4. Medication management  Z79.899 V58.69       Prozac and Lamictal do not need refills at this time.  Clonidine discontinued due to side effects. Wellbutrin 75 mg BID initiated. Patient is educated upon risks versus benefits as well as side effects and when to seek care in an emergency setting.  Patient verbalized understanding.  Discussed monitoring for mood and irritability changes, verbalized understanding.  Follow up in 4 weeks or sooner if needed.    GOALS:  Short Term Goals: Patient will be compliant with medication, and patient will have no significant medication related side effects.  Patient will be engaged in psychotherapy as indicated.  Patient will report subjective improvement of symptoms.  Long term goals: To stabilize mood and treat/improve subjective symptoms, the patient will stay out of the hospital, the patient will be at an optimal level of functioning, and the patient will take all medications as prescribed.  The patient/guardian verbalized understanding and agreement with goals that were mutually set.      TREATMENT PLAN: Continue supportive psychotherapy efforts and medications as indicated.  Pharmacological and Non-Pharmacological treatment options discussed during today's visit. Patient/Guardian acknowledged and verbally consented with current treatment plan and was educated on the importance of  compliance with treatment and follow-up appointments.      MEDICATION ISSUES:  Discussed medication options and treatment plan of prescribed medication as well as the risks, benefits, any black box warnings, and side effects including potential falls, possible impaired driving, and metabolic adversities among others. Patient is agreeable to call the office with any worsening of symptoms or onset of side effects, or if any concerns or questions arise.  The contact information for the office is made available to the patient. Patient is agreeable to call 911 or go to the nearest ER should they begin having any SI/HI, or if any urgent concerns arise. No medication side effects or related complaints today.     MEDS ORDERED DURING VISIT:  New Medications Ordered This Visit   Medications    buPROPion (WELLBUTRIN) 75 MG tablet     Sig: Take 1 tablet by mouth 2 (Two) Times a Day.     Dispense:  60 tablet     Refill:  0       Follow Up Appointment:   Return in about 4 weeks (around 11/6/2023) for Recheck.             This document has been electronically signed by DEVEN Murillo  October 9, 2023 10:57 EDT    Some of the data in this electronic note has been brought forward from a previous encounter, any necessary changes have been made, it has been reviewed by this APRN, and it is accurate.    Dictated Utilizing Dragon Dictation: Part of this note may be an electronic transcription/translation of spoken language to printed text using the Dragon Dictation System.

## 2023-10-23 RX ORDER — ATORVASTATIN CALCIUM 20 MG/1
TABLET, FILM COATED ORAL
Qty: 90 TABLET | Refills: 3 | Status: SHIPPED | OUTPATIENT
Start: 2023-10-23

## 2023-11-04 DIAGNOSIS — R41.840 CONCENTRATION DEFICIT: ICD-10-CM

## 2023-11-04 DIAGNOSIS — F33.8 SEASONAL DEPRESSION: ICD-10-CM

## 2023-11-04 DIAGNOSIS — Z79.899 MEDICATION MANAGEMENT: ICD-10-CM

## 2023-11-05 RX ORDER — BUPROPION HYDROCHLORIDE 75 MG/1
75 TABLET ORAL 2 TIMES DAILY
Qty: 60 TABLET | Refills: 0 | OUTPATIENT
Start: 2023-11-05

## 2023-11-06 ENCOUNTER — TELEMEDICINE (OUTPATIENT)
Dept: PSYCHIATRY | Facility: CLINIC | Age: 48
End: 2023-11-06
Payer: COMMERCIAL

## 2023-11-06 DIAGNOSIS — F31.60 BIPOLAR AFFECTIVE DISORDER, MIXED: Primary | ICD-10-CM

## 2023-11-06 DIAGNOSIS — F33.8 SEASONAL DEPRESSION: ICD-10-CM

## 2023-11-06 DIAGNOSIS — F90.2 ATTENTION DEFICIT HYPERACTIVITY DISORDER (ADHD), COMBINED TYPE: ICD-10-CM

## 2023-11-06 DIAGNOSIS — Z79.899 MEDICATION MANAGEMENT: ICD-10-CM

## 2023-11-06 PROCEDURE — 99214 OFFICE O/P EST MOD 30 MIN: CPT

## 2023-11-06 NOTE — PROGRESS NOTES
"This provider is located at the [facility] (formerly [facility]) (through [facility]), [address], [facility], [address] using a secure video visit through [employer]. Patient is being seen remotely via telehealth at their home address in [place], and stated they are in a secure environment for this session.   The patient's condition being consulted/diagnosed/treated is appropriate for telemedicine. The provider identified herself as well as her credentials.   The patient, and/or patients guardian, consent to be seen remotely, and when consent is given they understand that the consent allows for patient identifiable information to be sent to a third party as needed. They may refuse to be seen remotely at any time. The electronic data is encrypted and password protected, and the patient and/or guardian has been advised of the potential risks to privacy not withstanding such measures.   The use of a video visit has been reviewed with the patient and verbal informed consent has been obtained.   Patient identifiers used: Name and .    Subjective   Ronaldo Cantu is a 47 y.o. male who presents today for follow up    Chief Complaint:    Chief Complaint   Patient presents with    Anxiety    Depression    Med Management    Irritable        History of Present Illness:   History of Present Illness  Patient is a 47-year-old male presenting for 1 month follow-up related to depression, anxiety, irritability and concentration deficits.  Patient continues to voice compliance with medications.  Unfortunately he has sustained some side effects with use of Wellbutrin \"night sweats really bad, fatigue.  By 1:00 I cannot hold my eyes open.  Angeliq do anything.  Ringing in my ears, that has happened about 5 or 6 times.  More irritable at work especially.\"  He states coworker and wife have both noticed declining mood status with use of Wellbutrin.  Prior to initiation of Wellbutrin, irritability, depression and " "agitation seemed to be controlled.  PHQ performed at this time and increased from 3-10, indicating moderate depression \"I will feel depressed just irritated.\"  JACQUELINE performed as well and increased from 0-5, indicating mild anxiety.  He denies SI, HI, SIB, or hallucinations currently and is convincing.  Regarding his appetite, he has experienced some weight gain, feels he is overeating.  Sleep is better since clonidine has been discontinued.  He states he utilizes [employer] at times and \"sleeps like a rock. I'm good.\"  He also voices fear of another knee surgery, believing previous surgery is what \"finally put me over the edge.  I do not want to go through that again.\"  Continues to acknowledge concentration deficits with use of Wellbutrin \"30 seconds after touching a trailer could not find it and spent 15 minutes looking.  Cannot stay focused on tasks, constantly thinking three steps ahead.\"    Patient resides in a home with his wife of 7 years whom he cites as supportive.  He has 1 biological child and 2 stepchildren, all adults.  He has a college degree in biology and works full-time at [employer] as a , thoroughly enjoys his job.  He states he plans to retire in March, this is a newer decision for him but he states he will remain busy with working for himself.  He states physically work has been challenging with his current health conditions and working as many hours as he does, also experiencing some knee pain.  Yazidism Qu[employer] preference.  Enjoys golfing.  Denies drug or alcohol use. Denies situational changes since previous session. Denies situational status changes.       The following portions of the patient's history were reviewed and updated as appropriate: allergies, current medications, past family history, past medical history, past social history, past surgical history and problem list.    Past Psychiatric History:  Began Treatment: 7 years ago for anger management  Diagnoses: " unsure  Psychiatrist:Denies  Therapist: previously  Admission History:Denies  Medication Trials: effexor straterra (more irritable) and quelbree( suicidal), clonidine (increased thirst), wellbutrin (made worse)  Self Harm: Denies  Suicide Attempts:Denies   Psychosis, Anxiety, Depression:  N/A    Past Medical History:  Past Medical History:   Diagnosis Date    Nephrolithiasis        Substance Abuse History:   Types:Denies all, including illicit  Withdrawal Symptoms:Denies  Longest Period Sober:Not Applicable   AA: Not applicable     Social History:  Social History     Socioeconomic History    Marital status:     Number of children: 3   Tobacco Use    Smoking status: Never    Smokeless tobacco: Never   Substance and Sexual Activity    Alcohol use: Yes     Alcohol/week: 1.0 standard drink of alcohol     Types: 1 Cans of beer per week     Comment: 1 per day    Drug use: Never       Family History:  Family History   Problem Relation Age of Onset    Heart attack Mother     Suicide Attempts Sister     Cancer Maternal Grandmother     Lung cancer Paternal Grandfather        Past Surgical History:  Past Surgical History:   Procedure Laterality Date    APPENDECTOMY  2010    CARDIAC ABLATION  2019    CERVICAL FUSION  10/2012    KNEE ARTHROPLASTY, PARTIAL REPLACEMENT  2022    KNEE ARTHROSCOPY  2021    SHOULDER ARTHROSCOPY         Problem List:  Patient Active Problem List   Diagnosis    Chronic atrial fibrillation    Other hyperlipidemia    Primary osteoarthritis of both knees    Degeneration of lumbar or lumbosacral intervertebral disc    Toenail fungus    Bipolar affective disorder    Depression    Attention deficit hyperactivity disorder (ADHD)       Allergy:   Allergies   Allergen Reactions    Viloxazine Hcl Er Mental Status Change     Suicidal thoughts        Current Medications:   Current Outpatient Medications   Medication Sig Dispense Refill    atorvastatin (LIPITOR) 20 MG tablet TAKE 1 TABLET  BY MOUTH EVERY DAY IN THE EVENING 90 tablet 3    FLUoxetine (PROzac) 40 MG capsule Take 1 capsule by mouth Daily. 90 capsule 0    lamoTRIgine (LaMICtal) 25 MG tablet Take 2 tabs by mouth (50mg) twice daily 360 tablet 0    tiZANidine (ZANAFLEX) 4 MG tablet        No current facility-administered medications for this visit.       Review of Systems:    Review of Systems   Constitutional: Negative.    HENT: Negative.     Eyes: Negative.    Respiratory: Negative.     Cardiovascular: Negative.    Gastrointestinal: Negative.    Endocrine: Negative.    Genitourinary: Negative.         Kidney stone   Musculoskeletal:  Positive for back pain.   Skin: Negative.    Allergic/Immunologic: Negative.    Neurological: Negative.  Negative for seizures.   Hematological: Negative.    Psychiatric/Behavioral:  Positive for decreased concentration and positive for hyperactivity. The patient is nervous/anxious.          Physical Exam:   Physical Exam  Constitutional:       Appearance: Normal appearance. He is normal weight.   HENT:      Head: Normocephalic.      Nose: Nose normal.   Pulmonary:      Effort: Pulmonary effort is normal.   Musculoskeletal:         General: Normal range of motion.      Cervical back: Normal range of motion.   Neurological:      General: No focal deficit present.      Mental Status: He is alert and oriented to person, place, and time. Mental status is at baseline.   Psychiatric:         Attention and Perception: Attention and perception normal.         Mood and Affect: Affect normal. Mood is anxious.         Speech: Speech is rapid and pressured.         Behavior: Behavior normal. Behavior is cooperative.         Thought Content: Thought content normal.         Cognition and Memory: Cognition and memory normal.         Judgment: Judgment normal.     Vitals:  There were no vitals taken for this visit. There is no height or weight on file to calculate BMI.  Due to extenuating circumstances and possible current  health risks associated with the patient being present in a clinical setting (with current health restrictions in place in regards to possible COVID 19 transmission/exposure), the patient was seen remotely today via a MyChart Video Visit through [facility].  Unable to obtain vital signs due to nature of remote visit.  Height stated at 6ft1 inches.  Weight stated at 183 pounds.    Last 3 Blood Pressure Readings:  BP Readings from Last 3 Encounters:   06/19/23 122/68   01/16/23 114/68   01/03/23 104/78     PHQ-9 Depression Screening  Little interest or pleasure in doing things? 0-->not at all   Feeling down, depressed, or hopeless? 2-->more than half the days   Trouble falling or staying asleep, or sleeping too much? 0-->not at all   Feeling tired or having little energy? 3-->nearly every day   Poor appetite or overeating? 1-->several days   Feeling bad about yourself - or that you are a failure or have let yourself or your family down? 0-->not at all   Trouble concentrating on things, such as reading the newspaper or watching television? 3-->nearly every day   Moving or speaking so slowly that other people could have noticed? Or the opposite - being so fidgety or restless that you have been moving around a lot more than usual? 1-->several days   Thoughts that you would be better off dead, or of hurting yourself in some way? 0-->not at all   PHQ-9 Total Score 10   If you checked off any problems, how difficult have these problems made it for you to do your work, take care of things at home, or get along with other people? not difficult at all           Mental Status Exam:   Hygiene:   good  Cooperation:  Cooperative  Eye Contact:  Good  Psychomotor Behavior:  Appropriate  Affect:  Appropriate  Mood: normal  Hopelessness: Denies  Speech:  Normal  Thought Process:  Goal directed  Thought Content:  Normal  Suicidal:  None  Homicidal:  None  Hallucinations:  None  Delusion:  None  Memory:  Intact  Orientation:  Grossly  intact  Reliability:  good  Insight:  Good  Judgement:  Fair  Impulse Control:  Fair  Physical/Medical Issues:   back surgeries, knee surgeries, cardiac ablation 2019        Lab Results:   Office Visit on 06/19/2023   Component Date Value Ref Range Status    WBC 06/19/2023 7.12  3.40 - 10.80 10*3/mm3 Final    RBC 06/19/2023 4.85  4.14 - 5.80 10*6/mm3 Final    Hemoglobin 06/19/2023 14.3  13.0 - 17.7 g/dL Final    Hematocrit 06/19/2023 42.0  37.5 - 51.0 % Final    MCV 06/19/2023 86.6  79.0 - 97.0 fL Final    MCH 06/19/2023 29.5  26.6 - 33.0 pg Final    MCHC 06/19/2023 34.0  31.5 - 35.7 g/dL Final    RDW 06/19/2023 13.1  12.3 - 15.4 % Final    RDW-SD 06/19/2023 41.0  37.0 - 54.0 fl Final    MPV 06/19/2023 11.1  6.0 - 12.0 fL Final    Platelets 06/19/2023 183  140 - 450 10*3/mm3 Final    Glucose 06/19/2023 91  65 - 99 mg/dL Final    BUN 06/19/2023 16  6 - 20 mg/dL Final    Creatinine 06/19/2023 1.17  0.76 - 1.27 mg/dL Final    Sodium 06/19/2023 143  136 - 145 mmol/L Final    Potassium 06/19/2023 4.3  3.5 - 5.2 mmol/L Final    Chloride 06/19/2023 105  98 - 107 mmol/L Final    CO2 06/19/2023 28.0  22.0 - 29.0 mmol/L Final    Calcium 06/19/2023 10.2  8.6 - 10.5 mg/dL Final    Total Protein 06/19/2023 7.3  6.0 - 8.5 g/dL Final    Albumin 06/19/2023 4.8  3.5 - 5.2 g/dL Final    ALT (SGPT) 06/19/2023 27  1 - 41 U/L Final    AST (SGOT) 06/19/2023 24  1 - 40 U/L Final    Alkaline Phosphatase 06/19/2023 79  39 - 117 U/L Final    Total Bilirubin 06/19/2023 0.3  0.0 - 1.2 mg/dL Final    Globulin 06/19/2023 2.5  gm/dL Final    A/G Ratio 06/19/2023 1.9  g/dL Final    BUN/Creatinine Ratio 06/19/2023 13.7  7.0 - 25.0 Final    Anion Gap 06/19/2023 10.0  5.0 - 15.0 mmol/L Final    eGFR 06/19/2023 76.4  >60.0 mL/min/1.73 Final    TSH 06/19/2023 3.430  0.270 - 4.200 uIU/mL Final    Vitamin B-12 06/19/2023 403  211 - 946 pg/mL Final         Assessment & Plan   Problems Addressed this Visit          Mental Health    Attention deficit  hyperactivity disorder (ADHD)     Other Visit Diagnoses       Bipolar affective disorder, mixed    -  Primary    Seasonal depression        Medication management        Relevant Orders    Urine Drug Screen - Urine, Clean Catch          Diagnoses         Codes Comments    Bipolar affective disorder, mixed    -  Primary ICD-10-CM: F31.60  ICD-9-CM: 296.60     Seasonal depression     ICD-10-CM: F33.8  ICD-9-CM: 296.99     Attention deficit hyperactivity disorder (ADHD), combined type     ICD-10-CM: F90.2  ICD-9-CM: 314.01     Medication management     ICD-10-CM: Z79.899  ICD-9-CM: V58.69             Visit Diagnoses:    ICD-10-CM ICD-9-CM   1. Bipolar affective disorder, mixed  F31.60 296.60   2. Seasonal depression  F33.8 296.99   3. Attention deficit hyperactivity disorder (ADHD), combined type  F90.2 314.01   4. Medication management  Z79.899 V58.69     Wellbutrin discontinued due to side effects experienced. Prozac and lamictal refilled.  Vyvanse 30 mg daily initiated. Patient is educated upon risks versus benefits as well as side effects and when to seek care in an emergency setting.  Patient verbalized understanding.  Controlled substance agreement sent to patient at this time, UDS ordered.  Upon completion of both items will send Vyvanse to pharmacy.  Patient instructed to call office for follow-up appointment.  Follow up in 4 weeks.    GOALS:  Short Term Goals: Patient will be compliant with medication, and patient will have no significant medication related side effects.  Patient will be engaged in psychotherapy as indicated.  Patient will report subjective improvement of symptoms.  Long term goals: To stabilize mood and treat/improve subjective symptoms, the patient will stay out of the hospital, the patient will be at an optimal level of functioning, and the patient will take all medications as prescribed.  The patient/guardian verbalized understanding and agreement with goals that were mutually  set.      TREATMENT PLAN: Continue supportive psychotherapy efforts and medications as indicated.  Pharmacological and Non-Pharmacological treatment options discussed during today's visit. Patient/Guardian acknowledged and verbally consented with current treatment plan and was educated on the importance of compliance with treatment and follow-up appointments.      MEDICATION ISSUES:  Discussed medication options and treatment plan of prescribed medication as well as the risks, benefits, any black box warnings, and side effects including potential falls, possible impaired driving, and metabolic adversities among others. Patient is agreeable to call the office with any worsening of symptoms or onset of side effects, or if any concerns or questions arise.  The contact information for the office is made available to the patient. Patient is agreeable to call 911 or go to the nearest ER should they begin having any SI/HI, or if any urgent concerns arise. No medication side effects or related complaints today.     MEDS ORDERED DURING VISIT:  No orders of the defined types were placed in this encounter.      Follow Up Appointment:   Return in about 4 weeks (around 12/4/2023) for Recheck.             This document has been electronically signed by DEVEN Murillo  November 6, 2023 17:01 EST    Some of the data in this electronic note has been brought forward from a previous encounter, any necessary changes have been made, it has been reviewed by this APRN, and it is accurate.    Dictated Utilizing Dragon Dictation: Part of this note may be an electronic transcription/translation of spoken language to printed text using the Dragon Dictation System.

## 2023-11-07 ENCOUNTER — TELEPHONE (OUTPATIENT)
Dept: PSYCHIATRY | Facility: CLINIC | Age: 48
End: 2023-11-07
Payer: COMMERCIAL

## 2023-11-07 NOTE — TELEPHONE ENCOUNTER
----- Message from DEVEN Murillo sent at 11/6/2023  4:59 PM EST -----  Regarding: Vyvanse  Called patient, no answer left voicemail. Informed of controlled substance agreement will be sent, UDS ordered. Upon completion of both will send Vyvanse to pharmacy. Can we call to follow up to make sure he was able to access the agreement? Thank you!

## 2023-11-08 ENCOUNTER — LAB (OUTPATIENT)
Dept: LAB | Facility: HOSPITAL | Age: 48
End: 2023-11-08
Payer: COMMERCIAL

## 2023-11-08 DIAGNOSIS — Z79.899 MEDICATION MANAGEMENT: ICD-10-CM

## 2023-11-08 LAB
AMPHET+METHAMPHET UR QL: NEGATIVE
AMPHETAMINES UR QL: NEGATIVE
BARBITURATES UR QL SCN: NEGATIVE
BENZODIAZ UR QL SCN: NEGATIVE
BUPRENORPHINE SERPL-MCNC: NEGATIVE NG/ML
CANNABINOIDS SERPL QL: NEGATIVE
COCAINE UR QL: NEGATIVE
FENTANYL UR-MCNC: NEGATIVE NG/ML
METHADONE UR QL SCN: NEGATIVE
OPIATES UR QL: NEGATIVE
OXYCODONE UR QL SCN: NEGATIVE
PCP UR QL SCN: NEGATIVE
TRICYCLICS UR QL SCN: NEGATIVE

## 2023-11-08 PROCEDURE — 80307 DRUG TEST PRSMV CHEM ANLYZR: CPT

## 2023-11-09 DIAGNOSIS — F90.2 ATTENTION DEFICIT HYPERACTIVITY DISORDER (ADHD), COMBINED TYPE: Primary | ICD-10-CM

## 2023-11-09 RX ORDER — LISDEXAMFETAMINE DIMESYLATE CAPSULES 30 MG/1
30 CAPSULE ORAL EVERY MORNING
Qty: 30 CAPSULE | Refills: 0 | Status: SHIPPED | OUTPATIENT
Start: 2023-11-09

## 2023-11-09 NOTE — PROGRESS NOTES
TRISTIN negative-Vyvanse called to pharmacy. VM left regarding education. Information also sent via [employer] at this time.

## 2023-11-11 DIAGNOSIS — F33.8 SEASONAL DEPRESSION: ICD-10-CM

## 2023-11-13 RX ORDER — FLUOXETINE HYDROCHLORIDE 20 MG/1
20 CAPSULE ORAL DAILY
Qty: 90 CAPSULE | Refills: 1 | OUTPATIENT
Start: 2023-11-13

## 2023-11-28 ENCOUNTER — TELEMEDICINE (OUTPATIENT)
Dept: PSYCHIATRY | Facility: CLINIC | Age: 48
End: 2023-11-28
Payer: COMMERCIAL

## 2023-11-28 DIAGNOSIS — F90.2 ATTENTION DEFICIT HYPERACTIVITY DISORDER (ADHD), COMBINED TYPE: Primary | ICD-10-CM

## 2023-11-28 DIAGNOSIS — F31.60 BIPOLAR AFFECTIVE DISORDER, MIXED: ICD-10-CM

## 2023-11-28 DIAGNOSIS — F33.8 SEASONAL DEPRESSION: ICD-10-CM

## 2023-11-28 DIAGNOSIS — Z79.899 MEDICATION MANAGEMENT: ICD-10-CM

## 2023-11-28 RX ORDER — LISDEXAMFETAMINE DIMESYLATE CAPSULES 50 MG/1
50 CAPSULE ORAL EVERY MORNING
Qty: 30 CAPSULE | Refills: 0 | Status: SHIPPED | OUTPATIENT
Start: 2023-11-28

## 2023-11-28 RX ORDER — FLUOXETINE HYDROCHLORIDE 40 MG/1
40 CAPSULE ORAL DAILY
Qty: 90 CAPSULE | Refills: 0 | Status: SHIPPED | OUTPATIENT
Start: 2023-11-28

## 2023-11-28 RX ORDER — LAMOTRIGINE 25 MG/1
TABLET ORAL
Qty: 360 TABLET | Refills: 0 | Status: SHIPPED | OUTPATIENT
Start: 2023-11-28

## 2023-11-28 NOTE — PROGRESS NOTES
"This provider is located at the [facility] (through [facility]), [address], [place], [address] using a secure video visit through [employer]. Patient is being seen remotely via telehealth at their home address in [place], and stated they are in a secure environment for this session.   The patient's condition being consulted/diagnosed/treated is appropriate for telemedicine. The provider identified herself as well as her credentials.   The patient, and/or patients guardian, consent to be seen remotely, and when consent is given they understand that the consent allows for patient identifiable information to be sent to a third party as needed. They may refuse to be seen remotely at any time. The electronic data is encrypted and password protected, and the patient and/or guardian has been advised of the potential risks to privacy not withstanding such measures.   The use of a video visit has been reviewed with the patient and verbal informed consent has been obtained.   Patient identifiers used: Name and .    Subjective   Ronaldo Cantu is a 48 y.o. male who presents today for follow up    Chief Complaint:    Chief Complaint   Patient presents with    ADHD    Depression    Med Management    Anxiety        History of Present Illness:   History of Present Illness  Patient is a 47-year-old male presenting for 1 month follow-up related to depression, anxiety, irritability and concentration deficits.  Patient continues to voice compliance with medications.  Denies side effects aside from some jaw clenching.  Patient states he would Quinja Gel before, supposed to wear nightguard at night.  Has seemed to notice this more but is unsure if it is medication related.  When asked about concentration \"I am trying to see if it is helping me concentrate.\"  Patient states he will have conversations with his wife and \"I had never hurt her.\"  Regarding mood stability \"this is the best I " "have felt mentally.\"  He states he previously suffered sedation when going to work prior to Vyvanse, now staying awake while driving and his commute to work.  He also states \"I have always had noise in the background, why sleep comfortably without that.\"  Sleep is now improved.  Somewhat of a curb appetite with use.  This is concerning to him.  Jaw clenching also not concerning to him.  He denies SI, HI, SIB, or hallucinations currently and is convincing.  Denies irritability, depression, or anxiety.  PHQ score of 2 minimal for depression, JACQUELINE score of 0 negative for anxiety. Depression controlled despite winter months.    Patient resides in a home with his wife of 7 years whom he cites as supportive.  He has 1 biological child and 2 stepchildren, all adults.  He has a college degree in biology and works full-time at [employer] as a , thoroughly enjoys his job.  He states he plans to retire in March, this is a newer decision for him but he states he will remain busy with working for himself.  He states physically work has been challenging with his current health conditions and working as many hours as he does, also experiencing some knee pain.  [employer] preference.  Enjoys golfing.  Denies drug or alcohol use. Denies situational changes since previous session.        The following portions of the patient's history were reviewed and updated as appropriate: allergies, current medications, past family history, past medical history, past social history, past surgical history and problem list.    Past Psychiatric History:  Began Treatment: 7 years ago for anger management  Diagnoses: unsure  Psychiatrist:Denies  Therapist: previously  Admission History:Denies  Medication Trials: effexor straterra (more irritable) and quelbree( suicidal), clonidine (increased thirst), wellbutrin (made worse)  Self Harm: Denies  Suicide Attempts:Denies   Psychosis, Anxiety, Depression:  N/A    Past Medical " History:  Past Medical History:   Diagnosis Date    Nephrolithiasis        Substance Abuse History:   Types:Denies all, including illicit  Withdrawal Symptoms:Denies  Longest Period Sober:Not Applicable   AA: Not applicable     Social History:  Social History     Socioeconomic History    Marital status:     Number of children: 3   Tobacco Use    Smoking status: Never    Smokeless tobacco: Never   Substance and Sexual Activity    Alcohol use: Yes     Alcohol/week: 1.0 standard drink of alcohol     Types: 1 Cans of beer per week     Comment: 1 per day    Drug use: Never       Family History:  Family History   Problem Relation Age of Onset    Heart attack Mother     Suicide Attempts Sister     Cancer Maternal Grandmother     Lung cancer Paternal Grandfather        Past Surgical History:  Past Surgical History:   Procedure Laterality Date    APPENDECTOMY  09/2010    CARDIAC ABLATION  11/2019    CERVICAL FUSION  10/2012    KNEE ARTHROPLASTY, PARTIAL REPLACEMENT  12/2022    KNEE ARTHROSCOPY  05/2021    SHOULDER ARTHROSCOPY         Problem List:  Patient Active Problem List   Diagnosis    Chronic atrial fibrillation    Other hyperlipidemia    Primary osteoarthritis of both knees    Degeneration of lumbar or lumbosacral intervertebral disc    Toenail fungus    Bipolar affective disorder    Depression    Attention deficit hyperactivity disorder (ADHD)       Allergy:   Allergies   Allergen Reactions    Viloxazine Hcl Er Mental Status Change     Suicidal thoughts        Current Medications:   Current Outpatient Medications   Medication Sig Dispense Refill    FLUoxetine (PROzac) 40 MG capsule Take 1 capsule by mouth Daily. 90 capsule 0    lamoTRIgine (LaMICtal) 25 MG tablet Take 2 tabs by mouth (50mg) twice daily 360 tablet 0    lisdexamfetamine (Vyvanse) 50 MG capsule Take 1 capsule by mouth Every Morning 30 capsule 0    atorvastatin (LIPITOR) 20 MG tablet TAKE 1 TABLET BY MOUTH EVERY DAY IN THE EVENING 90 tablet 3     tiZANidine (ZANAFLEX) 4 MG tablet        No current facility-administered medications for this visit.       Review of Systems:    Review of Systems   Constitutional: Negative.    HENT: Negative.     Eyes: Negative.    Respiratory: Negative.     Cardiovascular: Negative.    Gastrointestinal: Negative.    Endocrine: Negative.    Genitourinary: Negative.         Kidney stone   Musculoskeletal:  Positive for back pain.   Skin: Negative.    Allergic/Immunologic: Negative.    Neurological: Negative.  Negative for seizures.   Hematological: Negative.    Psychiatric/Behavioral:  Positive for decreased concentration and positive for hyperactivity. The patient is nervous/anxious.          Physical Exam:   Physical Exam  Constitutional:       Appearance: Normal appearance. He is normal weight.   HENT:      Head: Normocephalic.      Nose: Nose normal.   Pulmonary:      Effort: Pulmonary effort is normal.   Musculoskeletal:         General: Normal range of motion.      Cervical back: Normal range of motion.   Neurological:      General: No focal deficit present.      Mental Status: He is alert and oriented to person, place, and time. Mental status is at baseline.   Psychiatric:         Attention and Perception: Attention and perception normal.         Mood and Affect: Affect normal. Mood is anxious.         Speech: Speech is rapid and pressured.         Behavior: Behavior normal. Behavior is cooperative.         Thought Content: Thought content normal.         Cognition and Memory: Cognition and memory normal.         Judgment: Judgment normal.     Vitals:  There were no vitals taken for this visit. There is no height or weight on file to calculate BMI.  Due to extenuating circumstances and possible current health risks associated with the patient being present in a clinical setting (with current health restrictions in place in regards to possible COVID 19 transmission/exposure), the patient was seen remotely today via a [facility]  Video Visit through [employer].  Unable to obtain vital signs due to nature of remote visit.  Height stated at 6ft1 inches.  Weight stated at 183 pounds.    Last 3 Blood Pressure Readings:  BP Readings from Last 3 Encounters:   06/19/23 122/68   01/16/23 114/68   01/03/23 104/78     PHQ-9 Depression Screening  Little interest or pleasure in doing things? (P) 0-->not at all   Feeling down, depressed, or hopeless? (P) 0-->not at all   Trouble falling or staying asleep, or sleeping too much? (P) 0-->not at all   Feeling tired or having little energy? (P) 1-->several days   Poor appetite or overeating? (P) 0-->not at all   Feeling bad about yourself - or that you are a failure or have let yourself or your family down? (P) 0-->not at all   Trouble concentrating on things, such as reading the newspaper or watching television? (P) 1-->several days   Moving or speaking so slowly that other people could have noticed? Or the opposite - being so fidgety or restless that you have been moving around a lot more than usual? (P) 0-->not at all   Thoughts that you would be better off dead, or of hurting yourself in some way? (P) 0-->not at all   PHQ-9 Total Score (P) 2   If you checked off any problems, how difficult have these problems made it for you to do your work, take care of things at home, or get along with other people? (P) not difficult at all           Mental Status Exam:   Hygiene:   good  Cooperation:  Cooperative  Eye Contact:  Good  Psychomotor Behavior:  Appropriate  Affect:  Appropriate  Mood: normal  Hopelessness: Denies  Speech:  Normal  Thought Process:  Goal directed  Thought Content:  Normal  Suicidal:  None  Homicidal:  None  Hallucinations:  None  Delusion:  None  Memory:  Intact  Orientation:  Grossly intact  Reliability:  good  Insight:  Good  Judgement:  Fair  Impulse Control:  Fair  Physical/Medical Issues:   back surgeries, knee surgeries, cardiac ablation 2019        Lab Results:   Lab on 11/08/2023    Component Date Value Ref Range Status    THC, Screen, Urine 11/08/2023 Negative  Negative Final    Phencyclidine (PCP), Urine 11/08/2023 Negative  Negative Final    Cocaine Screen, Urine 11/08/2023 Negative  Negative Final    Methamphetamine, Ur 11/08/2023 Negative  Negative Final    Opiate Screen 11/08/2023 Negative  Negative Final    Amphetamine Screen, Urine 11/08/2023 Negative  Negative Final    Benzodiazepine Screen, Urine 11/08/2023 Negative  Negative Final    Tricyclic Antidepressants Screen 11/08/2023 Negative  Negative Final    Methadone Screen, Urine 11/08/2023 Negative  Negative Final    Barbiturates Screen, Urine 11/08/2023 Negative  Negative Final    Oxycodone Screen, Urine 11/08/2023 Negative  Negative Final    Buprenorphine, Screen, Urine 11/08/2023 Negative  Negative Final    Fentanyl, Urine 11/08/2023 Negative  Negative Final   Office Visit on 06/19/2023   Component Date Value Ref Range Status    WBC 06/19/2023 7.12  3.40 - 10.80 10*3/mm3 Final    RBC 06/19/2023 4.85  4.14 - 5.80 10*6/mm3 Final    Hemoglobin 06/19/2023 14.3  13.0 - 17.7 g/dL Final    Hematocrit 06/19/2023 42.0  37.5 - 51.0 % Final    MCV 06/19/2023 86.6  79.0 - 97.0 fL Final    MCH 06/19/2023 29.5  26.6 - 33.0 pg Final    MCHC 06/19/2023 34.0  31.5 - 35.7 g/dL Final    RDW 06/19/2023 13.1  12.3 - 15.4 % Final    RDW-SD 06/19/2023 41.0  37.0 - 54.0 fl Final    MPV 06/19/2023 11.1  6.0 - 12.0 fL Final    Platelets 06/19/2023 183  140 - 450 10*3/mm3 Final    Glucose 06/19/2023 91  65 - 99 mg/dL Final    BUN 06/19/2023 16  6 - 20 mg/dL Final    Creatinine 06/19/2023 1.17  0.76 - 1.27 mg/dL Final    Sodium 06/19/2023 143  136 - 145 mmol/L Final    Potassium 06/19/2023 4.3  3.5 - 5.2 mmol/L Final    Chloride 06/19/2023 105  98 - 107 mmol/L Final    CO2 06/19/2023 28.0  22.0 - 29.0 mmol/L Final    Calcium 06/19/2023 10.2  8.6 - 10.5 mg/dL Final    Total Protein 06/19/2023 7.3  6.0 - 8.5 g/dL Final    Albumin 06/19/2023 4.8  3.5 - 5.2  g/dL Final    ALT (SGPT) 06/19/2023 27  1 - 41 U/L Final    AST (SGOT) 06/19/2023 24  1 - 40 U/L Final    Alkaline Phosphatase 06/19/2023 79  39 - 117 U/L Final    Total Bilirubin 06/19/2023 0.3  0.0 - 1.2 mg/dL Final    Globulin 06/19/2023 2.5  gm/dL Final    A/G Ratio 06/19/2023 1.9  g/dL Final    BUN/Creatinine Ratio 06/19/2023 13.7  7.0 - 25.0 Final    Anion Gap 06/19/2023 10.0  5.0 - 15.0 mmol/L Final    eGFR 06/19/2023 76.4  >60.0 mL/min/1.73 Final    TSH 06/19/2023 3.430  0.270 - 4.200 uIU/mL Final    Vitamin B-12 06/19/2023 403  211 - 946 pg/mL Final         Assessment & Plan   Problems Addressed this Visit          Mental Health    Attention deficit hyperactivity disorder (ADHD) - Primary    Relevant Medications    FLUoxetine (PROzac) 40 MG capsule    lisdexamfetamine (Vyvanse) 50 MG capsule     Other Visit Diagnoses       Seasonal depression        Relevant Medications    FLUoxetine (PROzac) 40 MG capsule    lisdexamfetamine (Vyvanse) 50 MG capsule    Bipolar affective disorder, mixed        Relevant Medications    FLUoxetine (PROzac) 40 MG capsule    lamoTRIgine (LaMICtal) 25 MG tablet    lisdexamfetamine (Vyvanse) 50 MG capsule    Medication management        Relevant Medications    FLUoxetine (PROzac) 40 MG capsule    lamoTRIgine (LaMICtal) 25 MG tablet          Diagnoses         Codes Comments    Attention deficit hyperactivity disorder (ADHD), combined type    -  Primary ICD-10-CM: F90.2  ICD-9-CM: 314.01     Seasonal depression     ICD-10-CM: F33.8  ICD-9-CM: 296.99     Bipolar affective disorder, mixed     ICD-10-CM: F31.60  ICD-9-CM: 296.60     Medication management     ICD-10-CM: Z79.899  ICD-9-CM: V58.69             Visit Diagnoses:    ICD-10-CM ICD-9-CM   1. Attention deficit hyperactivity disorder (ADHD), combined type  F90.2 314.01   2. Seasonal depression  F33.8 296.99   3. Bipolar affective disorder, mixed  F31.60 296.60   4. Medication management  Z79.899 V58.69       Vyvanse increased to  50 mg daily.  Prozac and Lamictal refilled. Previously educated upon side effects with use of these medications as well as when to present for emergency services, denies at this time. Discussed if increased jaw clenching inform this provider. Follow up in 4 weeks or sooner if needed.    GOALS:  Short Term Goals: Patient will be compliant with medication, and patient will have no significant medication related side effects.  Patient will be engaged in psychotherapy as indicated.  Patient will report subjective improvement of symptoms.  Long term goals: To stabilize mood and treat/improve subjective symptoms, the patient will stay out of the hospital, the patient will be at an optimal level of functioning, and the patient will take all medications as prescribed.  The patient/guardian verbalized understanding and agreement with goals that were mutually set.      TREATMENT PLAN: Continue supportive psychotherapy efforts and medications as indicated.  Pharmacological and Non-Pharmacological treatment options discussed during today's visit. Patient/Guardian acknowledged and verbally consented with current treatment plan and was educated on the importance of compliance with treatment and follow-up appointments.      MEDICATION ISSUES:  Discussed medication options and treatment plan of prescribed medication as well as the risks, benefits, any black box warnings, and side effects including potential falls, possible impaired driving, and metabolic adversities among others. Patient is agreeable to call the office with any worsening of symptoms or onset of side effects, or if any concerns or questions arise.  The contact information for the office is made available to the patient. Patient is agreeable to call 911 or go to the nearest ER should they begin having any SI/HI, or if any urgent concerns arise. No medication side effects or related complaints today.     MEDS ORDERED DURING VISIT:  New Medications Ordered This Visit    Medications    FLUoxetine (PROzac) 40 MG capsule     Sig: Take 1 capsule by mouth Daily.     Dispense:  90 capsule     Refill:  0    lamoTRIgine (LaMICtal) 25 MG tablet     Sig: Take 2 tabs by mouth (50mg) twice daily     Dispense:  360 tablet     Refill:  0    lisdexamfetamine (Vyvanse) 50 MG capsule     Sig: Take 1 capsule by mouth Every Morning     Dispense:  30 capsule     Refill:  0       Follow Up Appointment:   Return in about 4 weeks (around 12/26/2023) for Recheck.             This document has been electronically signed by DEVEN Murillo  November 28, 2023 13:59 EST    Some of the data in this electronic note has been brought forward from a previous encounter, any necessary changes have been made, it has been reviewed by this APRN, and it is accurate.    Dictated Utilizing Dragon Dictation: Part of this note may be an electronic transcription/translation of spoken language to printed text using the Dragon Dictation System.

## 2023-12-06 ENCOUNTER — TELEPHONE (OUTPATIENT)
Dept: PSYCHIATRY | Facility: CLINIC | Age: 48
End: 2023-12-06
Payer: COMMERCIAL

## 2023-12-06 NOTE — TELEPHONE ENCOUNTER
Pt called and stated they need their Vyvanse sent to [facility] located at [address] [place] [address]. Due to other pharmacy not having med in stock

## 2023-12-09 DIAGNOSIS — Z79.899 MEDICATION MANAGEMENT: ICD-10-CM

## 2023-12-09 DIAGNOSIS — R41.840 CONCENTRATION DEFICIT: ICD-10-CM

## 2023-12-09 RX ORDER — CLONIDINE HYDROCHLORIDE 0.2 MG/1
0.2 TABLET ORAL
Qty: 90 TABLET | Refills: 0 | OUTPATIENT
Start: 2023-12-09

## 2023-12-21 ENCOUNTER — TELEMEDICINE (OUTPATIENT)
Dept: PSYCHIATRY | Facility: CLINIC | Age: 48
End: 2023-12-21
Payer: COMMERCIAL

## 2023-12-21 DIAGNOSIS — F33.8 SEASONAL DEPRESSION: ICD-10-CM

## 2023-12-21 DIAGNOSIS — F31.60 BIPOLAR AFFECTIVE DISORDER, MIXED: ICD-10-CM

## 2023-12-21 DIAGNOSIS — F90.2 ATTENTION DEFICIT HYPERACTIVITY DISORDER (ADHD), COMBINED TYPE: Primary | ICD-10-CM

## 2023-12-21 DIAGNOSIS — Z79.899 MEDICATION MANAGEMENT: ICD-10-CM

## 2023-12-21 RX ORDER — LISDEXAMFETAMINE DIMESYLATE CAPSULES 60 MG/1
60 CAPSULE ORAL EVERY MORNING
Qty: 30 CAPSULE | Refills: 0 | Status: SHIPPED | OUTPATIENT
Start: 2023-12-21

## 2023-12-21 RX ORDER — LAMOTRIGINE 25 MG/1
TABLET ORAL
Qty: 360 TABLET | Refills: 0 | Status: CANCELLED | OUTPATIENT
Start: 2023-12-21

## 2023-12-21 RX ORDER — FLUOXETINE HYDROCHLORIDE 40 MG/1
40 CAPSULE ORAL DAILY
Qty: 90 CAPSULE | Refills: 0 | Status: CANCELLED | OUTPATIENT
Start: 2023-12-21

## 2023-12-21 NOTE — PROGRESS NOTES
"This provider is located at the [facility] (through [facility]), [address], [place], [address] using a secure video visit through [employer]. Patient is being seen remotely via telehealth at their home address in [place], and stated they are in a secure environment for this session.   The patient's condition being consulted/diagnosed/treated is appropriate for telemedicine. The provider identified herself as well as her credentials.   The patient, and/or patients guardian, consent to be seen remotely, and when consent is given they understand that the consent allows for patient identifiable information to be sent to a third party as needed. They may refuse to be seen remotely at any time. The electronic data is encrypted and password protected, and the patient and/or guardian has been advised of the potential risks to privacy not withstanding such measures.   The use of a video visit has been reviewed with the patient and verbal informed consent has been obtained.   Patient identifiers used: Name and .    Subjective   Ronaldo Cantu is a 48 y.o. male who presents today for follow up    Chief Complaint:    Chief Complaint   Patient presents with    Anxiety    Depression    Med Management    ADHD        History of Present Illness:   History of Present Illness  Patient is a 47-year-old male presenting for 1 month follow-up related to depression, anxiety, irritability and concentration deficits.  Patient continues to voice compliance with medications.  Denies side effects aside from some jaw clenching, which he states has lessened since previous visit despite increase to Vyvanse.  Regarding efficacy of Vyvanse \"seems like I am able to recall things have never required before. regarding efficacy of Vyvanse and \"seems like I am able to recall things  I have never required before.\"  He also has Regarding mood lability, he voices this is controlled, denies irritability.  " "States his mood is \"fine, good.\"  Sleep is \"fine.\"  Some situational stressors, see notes below.  PHQ reduced from 201, negative for depression \"none.\"  JACQUELINE remains somewhat score of 0 negative for anxiety patient rates a 0/10. Somewhat of a curbed appetite admits states this is not problematic.  Denies SI, HI, SIB, or hallucinations currently and is convincing.  Does not seem to be struggling with seasonal depression currently.    Patient resides in a home with his wife of 7 years whom he cites as supportive.  He has 1 biological child and 2 stepchildren, all adults.  He has a college degree in biology and works full-time at [employer] as a , thoroughly enjoys his job.  He states he plans to retire in March, this is a newer decision for him but he states he will remain busy with working for himself.  He states physically work has been challenging with his current health conditions and working as many hours as he does, also experiencing some knee pain.  [employer] preference.  Enjoys golfing.  Denies drug or alcohol use.  Work has been a large stressor, recently told they would have to start pulling weekend shifts which was infuriating for him.       The following portions of the patient's history were reviewed and updated as appropriate: allergies, current medications, past family history, past medical history, past social history, past surgical history and problem list.    Past Psychiatric History:  Began Treatment: 7 years ago for anger management  Diagnoses: unsure  Psychiatrist:Denies  Therapist: previously  Admission History:Denies  Medication Trials: effexor straterra (more irritable) and quelbree( suicidal), clonidine (increased thirst), wellbutrin (made worse)  Self Harm: Denies  Suicide Attempts:Denies   Psychosis, Anxiety, Depression:  N/A    Past Medical History:  Past Medical History:   Diagnosis Date    Nephrolithiasis        Substance Abuse History:   Types:Denies all, including " illicit  Withdrawal Symptoms:Denies  Longest Period Sober:Not Applicable   AA: Not applicable     Social History:  Social History     Socioeconomic History    Marital status:     Number of children: 3   Tobacco Use    Smoking status: Never    Smokeless tobacco: Never   Substance and Sexual Activity    Alcohol use: Yes     Alcohol/week: 1.0 standard drink of alcohol     Types: 1 Cans of beer per week     Comment: 1 per day    Drug use: Never       Family History:  Family History   Problem Relation Age of Onset    Heart attack Mother     Suicide Attempts Sister     Cancer Maternal Grandmother     Lung cancer Paternal Grandfather        Past Surgical History:  Past Surgical History:   Procedure Laterality Date    APPENDECTOMY  09/2010    CARDIAC ABLATION  11/2019    CERVICAL FUSION  10/2012    KNEE ARTHROPLASTY, PARTIAL REPLACEMENT  12/2022    KNEE ARTHROSCOPY  05/2021    SHOULDER ARTHROSCOPY         Problem List:  Patient Active Problem List   Diagnosis    Chronic atrial fibrillation    Other hyperlipidemia    Primary osteoarthritis of both knees    Degeneration of lumbar or lumbosacral intervertebral disc    Toenail fungus    Bipolar affective disorder    Depression    Attention deficit hyperactivity disorder (ADHD)       Allergy:   Allergies   Allergen Reactions    Viloxazine Hcl Er Mental Status Change     Suicidal thoughts        Current Medications:   Current Outpatient Medications   Medication Sig Dispense Refill    lisdexamfetamine (VYVANSE) 60 MG capsule Take 1 capsule by mouth Every Morning 30 capsule 0    atorvastatin (LIPITOR) 20 MG tablet TAKE 1 TABLET BY MOUTH EVERY DAY IN THE EVENING 90 tablet 3    FLUoxetine (PROzac) 40 MG capsule Take 1 capsule by mouth Daily. 90 capsule 0    lamoTRIgine (LaMICtal) 25 MG tablet Take 2 tabs by mouth (50mg) twice daily 360 tablet 0    tiZANidine (ZANAFLEX) 4 MG tablet        No current facility-administered medications for this visit.       Review of Systems:     Review of Systems   Constitutional: Negative.    HENT: Negative.     Eyes: Negative.    Respiratory: Negative.     Cardiovascular: Negative.    Gastrointestinal: Negative.    Endocrine: Negative.    Genitourinary: Negative.         Kidney stone   Musculoskeletal:  Positive for back pain.   Skin: Negative.    Allergic/Immunologic: Negative.    Neurological: Negative.  Negative for seizures.   Hematological: Negative.    Psychiatric/Behavioral:  Positive for positive for hyperactivity. The patient is nervous/anxious.          Physical Exam:   Physical Exam  Constitutional:       Appearance: Normal appearance. He is normal weight.   HENT:      Head: Normocephalic.      Nose: Nose normal.   Pulmonary:      Effort: Pulmonary effort is normal.   Musculoskeletal:         General: Normal range of motion.      Cervical back: Normal range of motion.   Neurological:      General: No focal deficit present.      Mental Status: He is alert and oriented to person, place, and time. Mental status is at baseline.   Psychiatric:         Attention and Perception: Attention and perception normal.         Mood and Affect: Affect normal. Mood is anxious.         Speech: Speech normal.         Behavior: Behavior normal. Behavior is cooperative.         Thought Content: Thought content normal.         Cognition and Memory: Cognition and memory normal.         Judgment: Judgment normal.     Vitals:  There were no vitals taken for this visit. There is no height or weight on file to calculate BMI.  Due to extenuating circumstances and possible current health risks associated with the patient being present in a clinical setting (with current health restrictions in place in regards to possible COVID 19 transmission/exposure), the patient was seen remotely today via a MyChart Video Visit through [facility].  Unable to obtain vital signs due to nature of remote visit.  Height stated at 6ft1 inches.  Weight stated at 183 pounds.    Last 3 Blood  Pressure Readings:  BP Readings from Last 3 Encounters:   06/19/23 122/68   01/16/23 114/68   01/03/23 104/78     PHQ-9 Depression Screening  Little interest or pleasure in doing things? (P) 0-->not at all   Feeling down, depressed, or hopeless? (P) 0-->not at all   Trouble falling or staying asleep, or sleeping too much? (P) 0-->not at all   Feeling tired or having little energy? (P) 0-->not at all   Poor appetite or overeating? (P) 0-->not at all   Feeling bad about yourself - or that you are a failure or have let yourself or your family down? (P) 0-->not at all   Trouble concentrating on things, such as reading the newspaper or watching television? (P) 0-->not at all   Moving or speaking so slowly that other people could have noticed? Or the opposite - being so fidgety or restless that you have been moving around a lot more than usual? (P) 0-->not at all   Thoughts that you would be better off dead, or of hurting yourself in some way? (P) 0-->not at all   PHQ-9 Total Score (P) 0   If you checked off any problems, how difficult have these problems made it for you to do your work, take care of things at home, or get along with other people? (P) not difficult at all           Mental Status Exam:   Hygiene:   good  Cooperation:  Cooperative  Eye Contact:  Good  Psychomotor Behavior:  Appropriate  Affect:  Appropriate  Mood: normal  Hopelessness: Denies  Speech:  Normal  Thought Process:  Goal directed  Thought Content:  Normal  Suicidal:  None  Homicidal:  None  Hallucinations:  None  Delusion:  None  Memory:  Intact  Orientation:  Grossly intact  Reliability:  good  Insight:  Good  Judgement:  Fair  Impulse Control:  Fair  Physical/Medical Issues:   back surgeries, knee surgeries, cardiac ablation 2019        Lab Results:   Lab on 11/08/2023   Component Date Value Ref Range Status    THC, Screen, Urine 11/08/2023 Negative  Negative Final    Phencyclidine (PCP), Urine 11/08/2023 Negative  Negative Final    Cocaine  Screen, Urine 11/08/2023 Negative  Negative Final    Methamphetamine, Ur 11/08/2023 Negative  Negative Final    Opiate Screen 11/08/2023 Negative  Negative Final    Amphetamine Screen, Urine 11/08/2023 Negative  Negative Final    Benzodiazepine Screen, Urine 11/08/2023 Negative  Negative Final    Tricyclic Antidepressants Screen 11/08/2023 Negative  Negative Final    Methadone Screen, Urine 11/08/2023 Negative  Negative Final    Barbiturates Screen, Urine 11/08/2023 Negative  Negative Final    Oxycodone Screen, Urine 11/08/2023 Negative  Negative Final    Buprenorphine, Screen, Urine 11/08/2023 Negative  Negative Final    Fentanyl, Urine 11/08/2023 Negative  Negative Final         Assessment & Plan   Problems Addressed this Visit          Mental Health    Attention deficit hyperactivity disorder (ADHD) - Primary    Relevant Medications    lisdexamfetamine (VYVANSE) 60 MG capsule     Other Visit Diagnoses       Bipolar affective disorder, mixed        Relevant Medications    lisdexamfetamine (VYVANSE) 60 MG capsule    Seasonal depression        Relevant Medications    lisdexamfetamine (VYVANSE) 60 MG capsule    Medication management              Diagnoses         Codes Comments    Attention deficit hyperactivity disorder (ADHD), combined type    -  Primary ICD-10-CM: F90.2  ICD-9-CM: 314.01     Bipolar affective disorder, mixed     ICD-10-CM: F31.60  ICD-9-CM: 296.60     Seasonal depression     ICD-10-CM: F33.8  ICD-9-CM: 296.99     Medication management     ICD-10-CM: Z79.899  ICD-9-CM: V58.69             Visit Diagnoses:    ICD-10-CM ICD-9-CM   1. Attention deficit hyperactivity disorder (ADHD), combined type  F90.2 314.01   2. Bipolar affective disorder, mixed  F31.60 296.60   3. Seasonal depression  F33.8 296.99   4. Medication management  Z79.899 V58.69         Vyvanse increased to 60 mg daily.  Prozac and Lamictal refilled. Previously educated upon side effects with use of these medications as well as when to  present for emergency services, denies at this time. Discussed if increased jaw clenching inform this provider. Follow up in 4 weeks or sooner if needed.    GOALS:  Short Term Goals: Patient will be compliant with medication, and patient will have no significant medication related side effects.  Patient will be engaged in psychotherapy as indicated.  Patient will report subjective improvement of symptoms.  Long term goals: To stabilize mood and treat/improve subjective symptoms, the patient will stay out of the hospital, the patient will be at an optimal level of functioning, and the patient will take all medications as prescribed.  The patient/guardian verbalized understanding and agreement with goals that were mutually set.      TREATMENT PLAN: Continue supportive psychotherapy efforts and medications as indicated.  Pharmacological and Non-Pharmacological treatment options discussed during today's visit. Patient/Guardian acknowledged and verbally consented with current treatment plan and was educated on the importance of compliance with treatment and follow-up appointments.      MEDICATION ISSUES:  Discussed medication options and treatment plan of prescribed medication as well as the risks, benefits, any black box warnings, and side effects including potential falls, possible impaired driving, and metabolic adversities among others. Patient is agreeable to call the office with any worsening of symptoms or onset of side effects, or if any concerns or questions arise.  The contact information for the office is made available to the patient. Patient is agreeable to call 911 or go to the nearest ER should they begin having any SI/HI, or if any urgent concerns arise. No medication side effects or related complaints today.     MEDS ORDERED DURING VISIT:  New Medications Ordered This Visit   Medications    lisdexamfetamine (VYVANSE) 60 MG capsule     Sig: Take 1 capsule by mouth Every Morning     Dispense:  30 capsule      Refill:  0       Follow Up Appointment:   Return in about 4 weeks (around 1/18/2024) for Recheck.             This document has been electronically signed by DEVEN Murillo  December 21, 2023 14:42 EST    Some of the data in this electronic note has been brought forward from a previous encounter, any necessary changes have been made, it has been reviewed by this APRN, and it is accurate.    Dictated Utilizing Dragon Dictation: Part of this note may be an electronic transcription/translation of spoken language to printed text using the Dragon Dictation System.

## 2024-01-18 ENCOUNTER — TELEMEDICINE (OUTPATIENT)
Dept: PSYCHIATRY | Facility: CLINIC | Age: 49
End: 2024-01-18
Payer: COMMERCIAL

## 2024-01-18 DIAGNOSIS — F31.60 BIPOLAR AFFECTIVE DISORDER, MIXED: ICD-10-CM

## 2024-01-18 DIAGNOSIS — Z79.899 MEDICATION MANAGEMENT: ICD-10-CM

## 2024-01-18 DIAGNOSIS — F90.2 ATTENTION DEFICIT HYPERACTIVITY DISORDER (ADHD), COMBINED TYPE: Primary | ICD-10-CM

## 2024-01-18 RX ORDER — LISDEXAMFETAMINE DIMESYLATE CAPSULES 60 MG/1
60 CAPSULE ORAL EVERY MORNING
Qty: 30 CAPSULE | Refills: 0 | Status: SHIPPED | OUTPATIENT
Start: 2024-01-18

## 2024-01-18 RX ORDER — LAMOTRIGINE 25 MG/1
TABLET ORAL
Qty: 360 TABLET | Refills: 0 | Status: SHIPPED | OUTPATIENT
Start: 2024-01-18

## 2024-01-18 NOTE — PROGRESS NOTES
"This provider is located at the [facility] (through [facility]), [address], [facility], [address] using a secure video visit through [employer]. Patient is being seen remotely via telehealth at their home address in [place], and stated they are in a secure environment for this session.   The patient's condition being consulted/diagnosed/treated is appropriate for telemedicine. The provider identified herself as well as her credentials.   The patient, and/or patients guardian, consent to be seen remotely, and when consent is given they understand that the consent allows for patient identifiable information to be sent to a third party as needed. They may refuse to be seen remotely at any time. The electronic data is encrypted and password protected, and the patient and/or guardian has been advised of the potential risks to privacy not withstanding such measures.   The use of a video visit has been reviewed with the patient and verbal informed consent has been obtained.   Patient identifiers used: Name and .    Subjective   Ronaldo Cantu is a 48 y.o. male who presents today for follow up    Chief Complaint:    Chief Complaint   Patient presents with    Anxiety    Depression    Med Management        History of Present Illness:   History of Present Illness  Patient is a 48-year-old male presenting for 1 month follow-up related to depression, anxiety, irritability and concentration deficits.  Continues with use of Vyvanse, Prozac, Lamictal.  Voices compliance.  Denies most side effects although states he continues to struggle with some intermittent jaw clenching that is not concerning to him at this time.  He has benefited from Vyvanse, \"I can recall stuff I was not able to remember before.\"  Regarding concentration \"everything is fine now in that realm.\"  He denies any difficulty now and states he has been getting to work early.  He also states \"before Vyvanse I struggled to " "stay awake.\"  She was approximately 6-1/2 hours of sleep nightly.  Denies appetite changes since previous session.  Denies both depression and anxiety, patient rating both conditions a 0/10.  Denies mood lability or irritability currently.  PHQ reduced from 1-0, minimal for depression.  JACQUELINE remains the same with a score of 0 negative for anxiety.  He denies SI, HI, SIB, or hallucinations currently and is convincing.  Denies medical status changes.    Patient resides in a home with his wife of 8 years whom he cites as supportive.  He has 1 biological child and 2 stepchildren, all adults.  He has a college degree in biology and works full-time at [employer] as a , thoroughly enjoys his job although admits harsh temperatures can make work stressful. He states he plans to retire in March, this is a newer decision for him but he states he will remain busy with working for himself.  Has a job to complete in [place] where he will have to reside over the next 10 weeks. [employer] preference.  Enjoys golfing.  Denies drug or alcohol use.  Work continues to be a large stressor but he is looking forward to alf.        The following portions of the patient's history were reviewed and updated as appropriate: allergies, current medications, past family history, past medical history, past social history, past surgical history and problem list.    Past Psychiatric History:  Began Treatment: 7 years ago for anger management  Diagnoses: unsure  Psychiatrist:Denies  Therapist: previously  Admission History:Denies  Medication Trials: effexor straterra (more irritable) and quelbree( suicidal), clonidine (increased thirst), wellbutrin (made worse)  Self Harm: Denies  Suicide Attempts:Denies   Psychosis, Anxiety, Depression:  N/A    Past Medical History:  Past Medical History:   Diagnosis Date    Nephrolithiasis        Substance Abuse History:   Types:Denies all, including illicit  Withdrawal " Symptoms:Denies  Longest Period Sober:Not Applicable   AA: Not applicable     Social History:  Social History     Socioeconomic History    Marital status:     Number of children: 3   Tobacco Use    Smoking status: Never    Smokeless tobacco: Never   Substance and Sexual Activity    Alcohol use: Yes     Alcohol/week: 1.0 standard drink of alcohol     Types: 1 Cans of beer per week     Comment: 1 per day    Drug use: Never       Family History:  Family History   Problem Relation Age of Onset    Heart attack Mother     Suicide Attempts Sister     Cancer Maternal Grandmother     Lung cancer Paternal Grandfather        Past Surgical History:  Past Surgical History:   Procedure Laterality Date    APPENDECTOMY  09/2010    CARDIAC ABLATION  11/2019    CERVICAL FUSION  10/2012    KNEE ARTHROPLASTY, PARTIAL REPLACEMENT  12/2022    KNEE ARTHROSCOPY  05/2021    SHOULDER ARTHROSCOPY         Problem List:  Patient Active Problem List   Diagnosis    Chronic atrial fibrillation    Other hyperlipidemia    Primary osteoarthritis of both knees    Degeneration of lumbar or lumbosacral intervertebral disc    Toenail fungus    Bipolar affective disorder    Depression    Attention deficit hyperactivity disorder (ADHD)       Allergy:   Allergies   Allergen Reactions    Viloxazine Hcl Er Mental Status Change     Suicidal thoughts        Current Medications:   Current Outpatient Medications   Medication Sig Dispense Refill    lamoTRIgine (LaMICtal) 25 MG tablet Take 2 tabs by mouth (50mg) twice daily 360 tablet 0    lisdexamfetamine (VYVANSE) 60 MG capsule Take 1 capsule by mouth Every Morning 30 capsule 0    atorvastatin (LIPITOR) 20 MG tablet TAKE 1 TABLET BY MOUTH EVERY DAY IN THE EVENING 90 tablet 3    FLUoxetine (PROzac) 40 MG capsule Take 1 capsule by mouth Daily. 90 capsule 0    tiZANidine (ZANAFLEX) 4 MG tablet        No current facility-administered medications for this visit.       Review of Systems:    Review of Systems    Constitutional: Negative.    HENT: Negative.     Eyes: Negative.    Respiratory: Negative.     Cardiovascular: Negative.    Gastrointestinal: Negative.    Endocrine: Negative.    Genitourinary: Negative.         Kidney stone   Musculoskeletal:  Positive for back pain.   Skin: Negative.    Allergic/Immunologic: Negative.    Neurological: Negative.  Negative for seizures.   Hematological: Negative.    Psychiatric/Behavioral:  Positive for positive for hyperactivity. The patient is nervous/anxious.          Physical Exam:   Physical Exam  Constitutional:       Appearance: Normal appearance. He is normal weight.   HENT:      Head: Normocephalic.      Nose: Nose normal.   Pulmonary:      Effort: Pulmonary effort is normal.   Musculoskeletal:         General: Normal range of motion.      Cervical back: Normal range of motion.   Neurological:      General: No focal deficit present.      Mental Status: He is alert and oriented to person, place, and time. Mental status is at baseline.   Psychiatric:         Attention and Perception: Attention and perception normal.         Mood and Affect: Affect normal. Mood is anxious.         Speech: Speech normal.         Behavior: Behavior normal. Behavior is cooperative.         Thought Content: Thought content normal.         Cognition and Memory: Cognition and memory normal.         Judgment: Judgment normal.     Vitals:  There were no vitals taken for this visit. There is no height or weight on file to calculate BMI.  Due to extenuating circumstances and possible current health risks associated with the patient being present in a clinical setting (with current health restrictions in place in regards to possible COVID 19 transmission/exposure), the patient was seen remotely today via a MyChart Video Visit through [facility].  Unable to obtain vital signs due to nature of remote visit.  Height stated at 6ft1 inches.  Weight stated at 183 pounds.    Last 3 Blood Pressure Readings:  BP  Readings from Last 3 Encounters:   06/19/23 122/68   01/16/23 114/68   01/03/23 104/78     PHQ-9 Depression Screening  Little interest or pleasure in doing things? (P) 0-->not at all   Feeling down, depressed, or hopeless? (P) 0-->not at all   Trouble falling or staying asleep, or sleeping too much? (P) 0-->not at all   Feeling tired or having little energy? (P) 0-->not at all   Poor appetite or overeating? (P) 0-->not at all   Feeling bad about yourself - or that you are a failure or have let yourself or your family down? (P) 0-->not at all   Trouble concentrating on things, such as reading the newspaper or watching television? (P) 0-->not at all   Moving or speaking so slowly that other people could have noticed? Or the opposite - being so fidgety or restless that you have been moving around a lot more than usual? (P) 0-->not at all   Thoughts that you would be better off dead, or of hurting yourself in some way? (P) 0-->not at all   PHQ-9 Total Score (P) 0   If you checked off any problems, how difficult have these problems made it for you to do your work, take care of things at home, or get along with other people? (P) not difficult at all           Mental Status Exam:   Hygiene:   good  Cooperation:  Cooperative  Eye Contact:  Good  Psychomotor Behavior:  Appropriate  Affect:  Appropriate  Mood: normal  Hopelessness: Denies  Speech:  Normal  Thought Process:  Goal directed  Thought Content:  Normal  Suicidal:  None  Homicidal:  None  Hallucinations:  None  Delusion:  None  Memory:  Intact  Orientation:  Grossly intact  Reliability:  good  Insight:  Good  Judgement:  Fair  Impulse Control:  Fair  Physical/Medical Issues:   back surgeries, knee surgeries, cardiac ablation 2019        Lab Results:   Lab on 11/08/2023   Component Date Value Ref Range Status    THC, Screen, Urine 11/08/2023 Negative  Negative Final    Phencyclidine (PCP), Urine 11/08/2023 Negative  Negative Final    Cocaine Screen, Urine 11/08/2023  Negative  Negative Final    Methamphetamine, Ur 11/08/2023 Negative  Negative Final    Opiate Screen 11/08/2023 Negative  Negative Final    Amphetamine Screen, Urine 11/08/2023 Negative  Negative Final    Benzodiazepine Screen, Urine 11/08/2023 Negative  Negative Final    Tricyclic Antidepressants Screen 11/08/2023 Negative  Negative Final    Methadone Screen, Urine 11/08/2023 Negative  Negative Final    Barbiturates Screen, Urine 11/08/2023 Negative  Negative Final    Oxycodone Screen, Urine 11/08/2023 Negative  Negative Final    Buprenorphine, Screen, Urine 11/08/2023 Negative  Negative Final    Fentanyl, Urine 11/08/2023 Negative  Negative Final         Assessment & Plan   Problems Addressed this Visit          Mental Health    Attention deficit hyperactivity disorder (ADHD) - Primary    Relevant Medications    lisdexamfetamine (VYVANSE) 60 MG capsule     Other Visit Diagnoses       Bipolar affective disorder, mixed        Relevant Medications    lamoTRIgine (LaMICtal) 25 MG tablet    lisdexamfetamine (VYVANSE) 60 MG capsule    Medication management        Relevant Medications    lamoTRIgine (LaMICtal) 25 MG tablet          Diagnoses         Codes Comments    Attention deficit hyperactivity disorder (ADHD), combined type    -  Primary ICD-10-CM: F90.2  ICD-9-CM: 314.01     Bipolar affective disorder, mixed     ICD-10-CM: F31.60  ICD-9-CM: 296.60     Medication management     ICD-10-CM: Z79.899  ICD-9-CM: V58.69             Visit Diagnoses:    ICD-10-CM ICD-9-CM   1. Attention deficit hyperactivity disorder (ADHD), combined type  F90.2 314.01   2. Bipolar affective disorder, mixed  F31.60 296.60   3. Medication management  Z79.899 V58.69       Lamictal and Vyvanse refilled.  Patient states he does not need Prozac refilled at this time.  We discussed risk versus benefit with use of Vyvanse related to current side effects, provided patient option of switching medications and the off-chance this may help also  informed he may continue to experience jaw clenching with alternate medications.  Discussed if this worsens in severity or frequency to inform this provider.  Also discussed if he would like to stay on Vyvanse may need to be followed by dentist due to jaw clenching.  He would like to continue Vyvanse for now.  Previously educated upon side effects with use of these medications as well as when to present for emergency services, denies at this time. Follow up in 4 weeks or sooner if needed.    GOALS:  Short Term Goals: Patient will be compliant with medication, and patient will have no significant medication related side effects.  Patient will be engaged in psychotherapy as indicated.  Patient will report subjective improvement of symptoms.  Long term goals: To stabilize mood and treat/improve subjective symptoms, the patient will stay out of the hospital, the patient will be at an optimal level of functioning, and the patient will take all medications as prescribed.  The patient/guardian verbalized understanding and agreement with goals that were mutually set.      TREATMENT PLAN: Continue supportive psychotherapy efforts and medications as indicated.  Pharmacological and Non-Pharmacological treatment options discussed during today's visit. Patient/Guardian acknowledged and verbally consented with current treatment plan and was educated on the importance of compliance with treatment and follow-up appointments.      MEDICATION ISSUES:  Discussed medication options and treatment plan of prescribed medication as well as the risks, benefits, any black box warnings, and side effects including potential falls, possible impaired driving, and metabolic adversities among others. Patient is agreeable to call the office with any worsening of symptoms or onset of side effects, or if any concerns or questions arise.  The contact information for the office is made available to the patient. Patient is agreeable to call 911 or go to  the nearest ER should they begin having any SI/HI, or if any urgent concerns arise. No medication side effects or related complaints today.     MEDS ORDERED DURING VISIT:  New Medications Ordered This Visit   Medications    lamoTRIgine (LaMICtal) 25 MG tablet     Sig: Take 2 tabs by mouth (50mg) twice daily     Dispense:  360 tablet     Refill:  0    lisdexamfetamine (VYVANSE) 60 MG capsule     Sig: Take 1 capsule by mouth Every Morning     Dispense:  30 capsule     Refill:  0       Follow Up Appointment:   Return in about 4 weeks (around 2/15/2024) for Recheck.             This document has been electronically signed by DEVEN Murillo  January 18, 2024 14:00 EST    Some of the data in this electronic note has been brought forward from a previous encounter, any necessary changes have been made, it has been reviewed by this APRN, and it is accurate.    Dictated Utilizing Dragon Dictation: Part of this note may be an electronic transcription/translation of spoken language to printed text using the Dragon Dictation System.

## 2024-01-20 ENCOUNTER — OFFICE VISIT (OUTPATIENT)
Dept: INTERNAL MEDICINE | Facility: CLINIC | Age: 49
End: 2024-01-20
Payer: COMMERCIAL

## 2024-01-20 VITALS
DIASTOLIC BLOOD PRESSURE: 68 MMHG | HEIGHT: 73 IN | HEART RATE: 97 BPM | TEMPERATURE: 96.6 F | OXYGEN SATURATION: 96 % | SYSTOLIC BLOOD PRESSURE: 120 MMHG | BODY MASS INDEX: 24.89 KG/M2 | WEIGHT: 187.8 LBS

## 2024-01-20 DIAGNOSIS — F33.42 RECURRENT MAJOR DEPRESSIVE DISORDER, IN FULL REMISSION: ICD-10-CM

## 2024-01-20 DIAGNOSIS — F90.0 ATTENTION DEFICIT HYPERACTIVITY DISORDER (ADHD), PREDOMINANTLY INATTENTIVE TYPE: ICD-10-CM

## 2024-01-20 DIAGNOSIS — F31.75 BIPOLAR DISORDER, IN PARTIAL REMISSION, MOST RECENT EPISODE DEPRESSED: ICD-10-CM

## 2024-01-20 DIAGNOSIS — I48.20 CHRONIC ATRIAL FIBRILLATION: Primary | ICD-10-CM

## 2024-01-20 DIAGNOSIS — M17.0 PRIMARY OSTEOARTHRITIS OF BOTH KNEES: ICD-10-CM

## 2024-01-20 DIAGNOSIS — Z00.00 ROUTINE GENERAL MEDICAL EXAMINATION AT A HEALTH CARE FACILITY: ICD-10-CM

## 2024-01-20 DIAGNOSIS — Z12.5 SCREENING FOR PROSTATE CANCER: ICD-10-CM

## 2024-01-20 DIAGNOSIS — E78.2 MIXED HYPERLIPIDEMIA: ICD-10-CM

## 2024-01-20 DIAGNOSIS — M51.37 DEGENERATION OF LUMBAR OR LUMBOSACRAL INTERVERTEBRAL DISC: ICD-10-CM

## 2024-01-20 NOTE — PROGRESS NOTES
"Patient is a 48 y.o. male who is here for a follow up of   Chief Complaint   Patient presents with    Annual Exam     Patient is here for an annual physical exam. Open sore in right nostril that comes and goes randomly, it very sore touch, happens about 4-5 times a year. Discuss possible prostate issues. Back, knees, and overall body pain.          HPI:    Here for CPE.  Has hx of chronic orthopedic complaints.  Followed by orthopedics, Dr Laughlin , and pain mgmt, Dr Dumont.  Does not feel he can safely climb ladders with high risk of fall.  Difficulty caring over 10 pounds without risk of injury to his back.  Lives in constant pain.  After standing over 10 mins his back begins to bother him.  Sitting for prolonged times , ie 10 mins, makes it difficult to get to a standing position.  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    History:     Patient Active Problem List   Diagnosis    Chronic atrial fibrillation    Mixed hyperlipidemia    Primary osteoarthritis of both knees    Degeneration of lumbar or lumbosacral intervertebral disc    Toenail fungus    Bipolar affective disorder    Depression    Attention deficit hyperactivity disorder (ADHD)       Past Medical History:   Diagnosis Date    Nephrolithiasis        Past Surgical History:   Procedure Laterality Date    APPENDECTOMY  09/2010    CARDIAC ABLATION  11/2019    CERVICAL FUSION  10/2012    KNEE ARTHROPLASTY, PARTIAL REPLACEMENT  12/2022    KNEE ARTHROSCOPY  05/2021    SHOULDER ARTHROSCOPY         Current Outpatient Medications on File Prior to Visit   Medication Sig    atorvastatin (LIPITOR) 20 MG tablet TAKE 1 TABLET BY MOUTH EVERY DAY IN THE EVENING    FLUoxetine (PROzac) 40 MG capsule Take 1 capsule by mouth Daily.    lamoTRIgine (LaMICtal) 25 MG tablet Take 2 tabs by mouth (50mg) twice daily    lisdexamfetamine (VYVANSE) 60 MG capsule Take 1 capsule by mouth Every Morning    tiZANidine (ZANAFLEX) 4 MG tablet      No current facility-administered medications on " "file prior to visit.       Family History   Problem Relation Age of Onset    Heart attack Mother     Suicide Attempts Sister     Cancer Maternal Grandmother     Lung cancer Paternal Grandfather        Social History     Socioeconomic History    Marital status:     Number of children: 3   Tobacco Use    Smoking status: Never    Smokeless tobacco: Never   Substance and Sexual Activity    Alcohol use: Yes     Alcohol/week: 1.0 standard drink of alcohol     Types: 1 Cans of beer per week     Comment: 1 per day    Drug use: Never         Review of Systems   Constitutional:  Negative for chills, fatigue, fever and unexpected weight change.   HENT:  Negative for congestion, ear pain, hearing loss, rhinorrhea, sinus pressure, sore throat and trouble swallowing.    Eyes:  Negative for discharge and itching.   Respiratory:  Negative for cough, chest tightness and shortness of breath.    Cardiovascular:  Negative for chest pain, palpitations and leg swelling.   Gastrointestinal:  Negative for abdominal pain, blood in stool, constipation, diarrhea and vomiting.        12/21 without polyps    Endocrine: Negative for polydipsia and polyuria.   Genitourinary:  Negative for difficulty urinating, dysuria, enuresis, frequency, hematuria and urgency.        9/22 psa   Musculoskeletal:  Positive for arthralgias. Negative for back pain, gait problem and joint swelling.   Skin:  Negative for rash and wound.   Allergic/Immunologic: Negative for immunocompromised state.   Neurological:  Negative for dizziness, syncope, weakness, light-headedness and numbness.   Hematological:  Does not bruise/bleed easily.   Psychiatric/Behavioral:  Negative for behavioral problems, dysphoric mood and sleep disturbance. The patient is not nervous/anxious.        /68   Pulse 97   Temp 96.6 °F (35.9 °C) (Temporal)   Ht 185.4 cm (72.99\")   Wt 85.2 kg (187 lb 12.8 oz)   SpO2 96%   BMI 24.78 kg/m²       Physical Exam  Constitutional:       " Appearance: Normal appearance. He is well-developed.   HENT:      Head: Normocephalic and atraumatic.      Right Ear: External ear normal.      Left Ear: External ear normal.      Nose: Nose normal.      Mouth/Throat:      Mouth: Mucous membranes are moist.      Pharynx: Oropharynx is clear.   Eyes:      Extraocular Movements: Extraocular movements intact.      Conjunctiva/sclera: Conjunctivae normal.      Pupils: Pupils are equal, round, and reactive to light.   Cardiovascular:      Rate and Rhythm: Normal rate and regular rhythm.      Pulses: Normal pulses.      Heart sounds: Normal heart sounds.   Pulmonary:      Effort: Pulmonary effort is normal.      Breath sounds: Normal breath sounds.   Abdominal:      General: Bowel sounds are normal.      Palpations: Abdomen is soft.   Genitourinary:     Comments: 12/21 colonoscopy noted  Musculoskeletal:      Cervical back: Normal range of motion and neck supple.      Comments: Pain on flexion past 45 degrees  Bilateral knee crepitus   Lymphadenopathy:      Cervical: No cervical adenopathy.   Skin:     General: Skin is warm and dry.   Neurological:      General: No focal deficit present.      Mental Status: He is alert and oriented to person, place, and time.   Psychiatric:         Mood and Affect: Mood normal.         Behavior: Behavior normal.         Thought Content: Thought content normal.         Procedure:      Discussion/Summary:    HME-counseled on diet and exercise  Hyperlipidemia-counseled on diet, fasting labs on Lipitor 20mg  Hx of Afib-no recurrence with the ablation  Knee osteoarthritis-f/u Ortho  Bipolar/ADD/depression-well controlled on current tx, [facility] notes reviewed  Chronic back pain-f/u Dr Dumont  High risk meds-labs soon     prior labs noted and dw patient    Will review records from ortho and pain mgmt and determine work restrictions needed    Reviewed the following with the patient: advised patient to avoid alcoholic beverages, encouraged patient to  exercise 5-7 days per week for 30 minutes at a time, and ideal body weight discussed with patient.        Current Outpatient Medications:     atorvastatin (LIPITOR) 20 MG tablet, TAKE 1 TABLET BY MOUTH EVERY DAY IN THE EVENING, Disp: 90 tablet, Rfl: 3    FLUoxetine (PROzac) 40 MG capsule, Take 1 capsule by mouth Daily., Disp: 90 capsule, Rfl: 0    lamoTRIgine (LaMICtal) 25 MG tablet, Take 2 tabs by mouth (50mg) twice daily, Disp: 360 tablet, Rfl: 0    lisdexamfetamine (VYVANSE) 60 MG capsule, Take 1 capsule by mouth Every Morning, Disp: 30 capsule, Rfl: 0    tiZANidine (ZANAFLEX) 4 MG tablet, , Disp: , Rfl:         Diagnoses and all orders for this visit:    1. Chronic atrial fibrillation (Primary)    2. Mixed hyperlipidemia    3. Attention deficit hyperactivity disorder (ADHD), predominantly inattentive type    4. Bipolar disorder, in partial remission, most recent episode depressed    5. Recurrent major depressive disorder, in full remission    6. Primary osteoarthritis of both knees    7. Degeneration of lumbar or lumbosacral intervertebral disc    8. Routine general medical examination at a health care facility  -     CBC (No Diff)  -     Comprehensive Metabolic Panel  -     Lipid Panel  -     TSH  -     PSA Screen  -     Vitamin B12  -     Urinalysis With Microscopic If Indicated (No Culture) - Urine, Clean Catch    9. Screening for prostate cancer  -     PSA Screen

## 2024-01-22 ENCOUNTER — LAB (OUTPATIENT)
Dept: INTERNAL MEDICINE | Facility: CLINIC | Age: 49
End: 2024-01-22
Payer: COMMERCIAL

## 2024-01-22 DIAGNOSIS — R73.09 ABNORMAL GLUCOSE: Primary | ICD-10-CM

## 2024-01-22 DIAGNOSIS — E78.2 MIXED HYPERLIPIDEMIA: Primary | ICD-10-CM

## 2024-01-22 LAB
ALBUMIN SERPL-MCNC: 4.5 G/DL (ref 3.5–5.2)
ALBUMIN/GLOB SERPL: 1.7 G/DL
ALP SERPL-CCNC: 80 U/L (ref 39–117)
ALT SERPL W P-5'-P-CCNC: 25 U/L (ref 1–41)
ANION GAP SERPL CALCULATED.3IONS-SCNC: 12 MMOL/L (ref 5–15)
AST SERPL-CCNC: 20 U/L (ref 1–40)
BILIRUB SERPL-MCNC: 0.2 MG/DL (ref 0–1.2)
BILIRUB UR QL STRIP: NEGATIVE
BUN SERPL-MCNC: 15 MG/DL (ref 6–20)
BUN/CREAT SERPL: 13.6 (ref 7–25)
CALCIUM SPEC-SCNC: 9.4 MG/DL (ref 8.6–10.5)
CHLORIDE SERPL-SCNC: 104 MMOL/L (ref 98–107)
CHOLEST SERPL-MCNC: 133 MG/DL (ref 0–200)
CLARITY UR: CLEAR
CO2 SERPL-SCNC: 27 MMOL/L (ref 22–29)
COLOR UR: YELLOW
CREAT SERPL-MCNC: 1.1 MG/DL (ref 0.76–1.27)
DEPRECATED RDW RBC AUTO: 41.7 FL (ref 37–54)
EGFRCR SERPLBLD CKD-EPI 2021: 81.8 ML/MIN/1.73
ERYTHROCYTE [DISTWIDTH] IN BLOOD BY AUTOMATED COUNT: 13.1 % (ref 12.3–15.4)
GLOBULIN UR ELPH-MCNC: 2.6 GM/DL
GLUCOSE SERPL-MCNC: 107 MG/DL (ref 65–99)
GLUCOSE UR STRIP-MCNC: NEGATIVE MG/DL
HBA1C MFR BLD: 5.7 % (ref 4.8–5.6)
HCT VFR BLD AUTO: 43 % (ref 37.5–51)
HDLC SERPL-MCNC: 52 MG/DL (ref 40–60)
HGB BLD-MCNC: 14.6 G/DL (ref 13–17.7)
HGB UR QL STRIP.AUTO: NEGATIVE
KETONES UR QL STRIP: NEGATIVE
LDLC SERPL CALC-MCNC: 68 MG/DL (ref 0–100)
LDLC/HDLC SERPL: 1.32 {RATIO}
LEUKOCYTE ESTERASE UR QL STRIP.AUTO: NEGATIVE
MCH RBC QN AUTO: 29.7 PG (ref 26.6–33)
MCHC RBC AUTO-ENTMCNC: 34 G/DL (ref 31.5–35.7)
MCV RBC AUTO: 87.6 FL (ref 79–97)
NITRITE UR QL STRIP: NEGATIVE
PH UR STRIP.AUTO: 7.5 [PH] (ref 5–8)
PLATELET # BLD AUTO: 174 10*3/MM3 (ref 140–450)
PMV BLD AUTO: 11.2 FL (ref 6–12)
POTASSIUM SERPL-SCNC: 3.7 MMOL/L (ref 3.5–5.2)
PROT SERPL-MCNC: 7.1 G/DL (ref 6–8.5)
PROT UR QL STRIP: NEGATIVE
PSA SERPL-MCNC: 0.86 NG/ML (ref 0–4)
RBC # BLD AUTO: 4.91 10*6/MM3 (ref 4.14–5.8)
SODIUM SERPL-SCNC: 143 MMOL/L (ref 136–145)
SP GR UR STRIP: 1.01 (ref 1–1.03)
TRIGL SERPL-MCNC: 63 MG/DL (ref 0–150)
TSH SERPL DL<=0.05 MIU/L-ACNC: 3.93 UIU/ML (ref 0.27–4.2)
UROBILINOGEN UR QL STRIP: NORMAL
VIT B12 BLD-MCNC: 550 PG/ML (ref 211–946)
VLDLC SERPL-MCNC: 13 MG/DL (ref 5–40)
WBC NRBC COR # BLD AUTO: 6.01 10*3/MM3 (ref 3.4–10.8)

## 2024-01-22 PROCEDURE — 36415 COLL VENOUS BLD VENIPUNCTURE: CPT | Performed by: INTERNAL MEDICINE

## 2024-01-22 PROCEDURE — G0103 PSA SCREENING: HCPCS | Performed by: INTERNAL MEDICINE

## 2024-01-22 PROCEDURE — 82607 VITAMIN B-12: CPT | Performed by: INTERNAL MEDICINE

## 2024-01-22 PROCEDURE — 83036 HEMOGLOBIN GLYCOSYLATED A1C: CPT | Performed by: INTERNAL MEDICINE

## 2024-01-22 PROCEDURE — 80061 LIPID PANEL: CPT | Performed by: INTERNAL MEDICINE

## 2024-01-22 PROCEDURE — 80050 GENERAL HEALTH PANEL: CPT | Performed by: INTERNAL MEDICINE

## 2024-01-22 PROCEDURE — 81003 URINALYSIS AUTO W/O SCOPE: CPT | Performed by: INTERNAL MEDICINE

## 2024-02-06 DIAGNOSIS — F90.2 ATTENTION DEFICIT HYPERACTIVITY DISORDER (ADHD), COMBINED TYPE: ICD-10-CM

## 2024-02-06 RX ORDER — LISDEXAMFETAMINE DIMESYLATE CAPSULES 60 MG/1
60 CAPSULE ORAL EVERY MORNING
Qty: 30 CAPSULE | Refills: 0 | Status: SHIPPED | OUTPATIENT
Start: 2024-02-06

## 2024-02-06 NOTE — TELEPHONE ENCOUNTER
Patient Comment: I am currently working out of town for the next month or so and need to have this prescription filled at a pharmacy in [facility]. The pharmacy is called [facility] in [place], [place]. Their address is: [address] [place], [place] [id number]. Telephone # [phone]     Patient requests refills. Please advise.

## 2024-02-15 ENCOUNTER — TELEMEDICINE (OUTPATIENT)
Dept: PSYCHIATRY | Facility: CLINIC | Age: 49
End: 2024-02-15
Payer: COMMERCIAL

## 2024-02-15 DIAGNOSIS — F31.60 BIPOLAR AFFECTIVE DISORDER, MIXED: ICD-10-CM

## 2024-02-15 DIAGNOSIS — F90.2 ATTENTION DEFICIT HYPERACTIVITY DISORDER (ADHD), COMBINED TYPE: Primary | ICD-10-CM

## 2024-02-15 DIAGNOSIS — Z79.899 MEDICATION MANAGEMENT: ICD-10-CM

## 2024-02-15 DIAGNOSIS — F33.8 SEASONAL DEPRESSION: ICD-10-CM

## 2024-03-13 ENCOUNTER — TELEMEDICINE (OUTPATIENT)
Dept: PSYCHIATRY | Facility: CLINIC | Age: 49
End: 2024-03-13
Payer: COMMERCIAL

## 2024-03-13 DIAGNOSIS — Z79.899 MEDICATION MANAGEMENT: ICD-10-CM

## 2024-03-13 DIAGNOSIS — F33.8 SEASONAL DEPRESSION: ICD-10-CM

## 2024-03-13 DIAGNOSIS — F90.2 ATTENTION DEFICIT HYPERACTIVITY DISORDER (ADHD), COMBINED TYPE: ICD-10-CM

## 2024-03-13 DIAGNOSIS — F31.60 BIPOLAR AFFECTIVE DISORDER, MIXED: Primary | ICD-10-CM

## 2024-03-13 RX ORDER — FLUOXETINE HYDROCHLORIDE 20 MG/1
20 CAPSULE ORAL DAILY
Qty: 30 CAPSULE | Refills: 0 | Status: SHIPPED | OUTPATIENT
Start: 2024-03-13

## 2024-03-13 NOTE — PROGRESS NOTES
"This provider is located at the [facility] (through [facility]), [address], [place], [address] using a secure video visit through [employer]. Patient is being seen remotely via telehealth at their home address in [place], and stated they are in a secure environment for this session.   The patient's condition being consulted/diagnosed/treated is appropriate for telemedicine. The provider identified herself as well as her credentials.   The patient, and/or patients guardian, consent to be seen remotely, and when consent is given they understand that the consent allows for patient identifiable information to be sent to a third party as needed. They may refuse to be seen remotely at any time. The electronic data is encrypted and password protected, and the patient and/or guardian has been advised of the potential risks to privacy not withstanding such measures.   The use of a video visit has been reviewed with the patient and verbal informed consent has been obtained.   Patient identifiers used: Name and .    Subjective   Ronaldo Cantu is a 48 y.o. male who presents today for follow up    Chief Complaint:    Chief Complaint   Patient presents with    Anxiety    Depression    Med Management    ADHD        History of Present Illness:   History of Present Illness  Patient is a 48-year-old male presenting for 1 month follow-up related to depression, anxiety, irritability and ADHD.  Voices compliance with medications.  Continues to experience some tightness in her jaw which has not worsened.  He also states he has been \"forcing air through my teeth\" and also some behavior surrounding smelling breath.  He states these began with use of Vyvanse and he would like to discuss alternative options.  Also has been suffering from a decreased libido which she would also like to discuss.  PHQ reduced from 7-0, negative for depression which patient rates a 0/10.  JACQUELINE score of 0 indicates " negative anxiety which patient also rates a 0/10.  He denies SI, HI, SIB, or hallucinations currently and is convincing.  Denies mood lability, sleep or appetite disturbances. Despite side effects, patient states he does believe medications have been beneficial in addressing symptoms.  Medically he recently received injections to his back, states this did help somewhat and he did not feel he needed to utilize the steroid.  Is having paperwork filled out due to disability with work status.  We discussed alternative medications, patient mentions cardiac ablation that was done in 2019, no complications since that time.  Chart reveals this was performed due to chronic A-fib.  Patient states he was cleared by cardiology.    Patient resides in a home with his wife of 8 years whom he cites as supportive.  He has 1 biological child and 2 stepchildren, all adults.  He has a college degree in biology and works full-time at [employer] as a , thoroughly enjoys his job although admits harsh temperatures can make work stressful. He states he plans to retire in March, this is a newer decision for him but he states he will remain busy with working for himself.  Has a job to complete in [place] where he will have to reside over the next 10 weeks. [employer]ism [employer] preference.  Enjoys golfing.  Denies drug or alcohol use.  Work continues to be a large stressor but he is looking forward to care home.        The following portions of the patient's history were reviewed and updated as appropriate: allergies, current medications, past family history, past medical history, past social history, past surgical history and problem list.    Past Psychiatric History:  Began Treatment: 7 years ago for anger management  Diagnoses: unsure  Psychiatrist:Denies  Therapist: previously  Admission History:Denies  Medication Trials: effexor straterra (more irritable) and quelbree( suicidal), clonidine (increased thirst), wellbutrin (made worse),  vyvanse (side effects-jaw clenching)  Self Harm: Denies  Suicide Attempts:Denies   Psychosis, Anxiety, Depression:  N/A    Past Medical History:  Past Medical History:   Diagnosis Date    Nephrolithiasis        Substance Abuse History:   Types:Denies all, including illicit  Withdrawal Symptoms:Denies  Longest Period Sober:Not Applicable   AA: Not applicable     Social History:  Social History     Socioeconomic History    Marital status:     Number of children: 3   Tobacco Use    Smoking status: Never    Smokeless tobacco: Never   Substance and Sexual Activity    Alcohol use: Yes     Alcohol/week: 1.0 standard drink of alcohol     Types: 1 Cans of beer per week     Comment: 1 per day    Drug use: Never       Family History:  Family History   Problem Relation Age of Onset    Heart attack Mother     Suicide Attempts Sister     Cancer Maternal Grandmother     Lung cancer Paternal Grandfather        Past Surgical History:  Past Surgical History:   Procedure Laterality Date    APPENDECTOMY  2010    CARDIAC ABLATION  2019    CERVICAL FUSION  10/2012    KNEE ARTHROPLASTY, PARTIAL REPLACEMENT  2022    KNEE ARTHROSCOPY  2021    SHOULDER ARTHROSCOPY         Problem List:  Patient Active Problem List   Diagnosis    Chronic atrial fibrillation    Mixed hyperlipidemia    Primary osteoarthritis of both knees    Degeneration of lumbar or lumbosacral intervertebral disc    Toenail fungus    Bipolar affective disorder    Depression    Attention deficit hyperactivity disorder (ADHD)       Allergy:   Allergies   Allergen Reactions    Viloxazine Hcl Er Mental Status Change     Suicidal thoughts        Current Medications:   Current Outpatient Medications   Medication Sig Dispense Refill    FLUoxetine (PROzac) 20 MG capsule Take 1 capsule by mouth Daily. 30 capsule 0    atorvastatin (LIPITOR) 20 MG tablet TAKE 1 TABLET BY MOUTH EVERY DAY IN THE EVENING 90 tablet 3    lamoTRIgine (LaMICtal) 25 MG tablet Take 2  tabs by mouth (50mg) twice daily 360 tablet 0    tiZANidine (ZANAFLEX) 4 MG tablet        No current facility-administered medications for this visit.       Review of Systems:    Review of Systems   Constitutional: Negative.    HENT: Negative.     Eyes: Negative.    Respiratory: Negative.     Cardiovascular: Negative.    Gastrointestinal: Negative.    Endocrine: Negative.    Genitourinary: Negative.         Kidney stone   Musculoskeletal:  Positive for back pain.   Skin: Negative.    Allergic/Immunologic: Negative.    Neurological: Negative.  Negative for seizures.   Hematological: Negative.    Psychiatric/Behavioral:  Positive for positive for hyperactivity and stress. The patient is nervous/anxious.          Physical Exam:   Physical Exam  Constitutional:       Appearance: Normal appearance. He is normal weight.   HENT:      Head: Normocephalic.      Nose: Nose normal.   Pulmonary:      Effort: Pulmonary effort is normal.   Musculoskeletal:         General: Normal range of motion.      Cervical back: Normal range of motion.   Neurological:      General: No focal deficit present.      Mental Status: He is alert and oriented to person, place, and time. Mental status is at baseline.   Psychiatric:         Attention and Perception: Attention and perception normal.         Mood and Affect: Affect normal. Mood is anxious.         Speech: Speech normal.         Behavior: Behavior normal. Behavior is cooperative.         Thought Content: Thought content normal.         Cognition and Memory: Cognition and memory normal.         Judgment: Judgment normal.     Vitals:  There were no vitals taken for this visit. There is no height or weight on file to calculate BMI.  Due to extenuating circumstances and possible current health risks associated with the patient being present in a clinical setting (with current health restrictions in place in regards to possible COVID 19 transmission/exposure), the patient was seen remotely  today via a [employer] Video Visit through [employer].  Unable to obtain vital signs due to nature of remote visit.  Height stated at 6ft1 inches.  Weight stated at 183 pounds.    Last 3 Blood Pressure Readings:  BP Readings from Last 3 Encounters:   01/20/24 120/68   06/19/23 122/68   01/16/23 114/68     PHQ-9 Depression Screening  Little interest or pleasure in doing things? (P) 0-->not at all   Feeling down, depressed, or hopeless? (P) 0-->not at all   Trouble falling or staying asleep, or sleeping too much? (P) 0-->not at all   Feeling tired or having little energy? (P) 0-->not at all   Poor appetite or overeating? (P) 0-->not at all   Feeling bad about yourself - or that you are a failure or have let yourself or your family down? (P) 0-->not at all   Trouble concentrating on things, such as reading the newspaper or watching television? (P) 0-->not at all   Moving or speaking so slowly that other people could have noticed? Or the opposite - being so fidgety or restless that you have been moving around a lot more than usual? (P) 0-->not at all   Thoughts that you would be better off dead, or of hurting yourself in some way? (P) 0-->not at all   PHQ-9 Total Score (P) 0   If you checked off any problems, how difficult have these problems made it for you to do your work, take care of things at home, or get along with other people? (P) not difficult at all       Mental Status Exam:   Hygiene:   good  Cooperation:  Cooperative  Eye Contact:  Good  Psychomotor Behavior:  Appropriate  Affect:  Appropriate  Mood: normal  Hopelessness: Denies  Speech:  Normal  Thought Process:  Goal directed  Thought Content:  Normal  Suicidal:  None  Homicidal:  None  Hallucinations:  None  Delusion:  None  Memory:  Intact  Orientation:  Grossly intact  Reliability:  good  Insight:  Good  Judgement:  Fair  Impulse Control:  Fair  Physical/Medical Issues:   back surgeries, knee surgeries, cardiac ablation 2019        Lab Results:   Orders Only on  01/22/2024   Component Date Value Ref Range Status    Hemoglobin A1C 01/22/2024 5.70 (H)  4.80 - 5.60 % Final   Office Visit on 01/20/2024   Component Date Value Ref Range Status    WBC 01/22/2024 6.01  3.40 - 10.80 10*3/mm3 Final    RBC 01/22/2024 4.91  4.14 - 5.80 10*6/mm3 Final    Hemoglobin 01/22/2024 14.6  13.0 - 17.7 g/dL Final    Hematocrit 01/22/2024 43.0  37.5 - 51.0 % Final    MCV 01/22/2024 87.6  79.0 - 97.0 fL Final    MCH 01/22/2024 29.7  26.6 - 33.0 pg Final    MCHC 01/22/2024 34.0  31.5 - 35.7 g/dL Final    RDW 01/22/2024 13.1  12.3 - 15.4 % Final    RDW-SD 01/22/2024 41.7  37.0 - 54.0 fl Final    MPV 01/22/2024 11.2  6.0 - 12.0 fL Final    Platelets 01/22/2024 174  140 - 450 10*3/mm3 Final    Glucose 01/22/2024 107 (H)  65 - 99 mg/dL Final    BUN 01/22/2024 15  6 - 20 mg/dL Final    Creatinine 01/22/2024 1.10  0.76 - 1.27 mg/dL Final    Sodium 01/22/2024 143  136 - 145 mmol/L Final    Potassium 01/22/2024 3.7  3.5 - 5.2 mmol/L Final    Chloride 01/22/2024 104  98 - 107 mmol/L Final    CO2 01/22/2024 27.0  22.0 - 29.0 mmol/L Final    Calcium 01/22/2024 9.4  8.6 - 10.5 mg/dL Final    Total Protein 01/22/2024 7.1  6.0 - 8.5 g/dL Final    Albumin 01/22/2024 4.5  3.5 - 5.2 g/dL Final    ALT (SGPT) 01/22/2024 25  1 - 41 U/L Final    AST (SGOT) 01/22/2024 20  1 - 40 U/L Final    Alkaline Phosphatase 01/22/2024 80  39 - 117 U/L Final    Total Bilirubin 01/22/2024 0.2  0.0 - 1.2 mg/dL Final    Globulin 01/22/2024 2.6  gm/dL Final    A/G Ratio 01/22/2024 1.7  g/dL Final    BUN/Creatinine Ratio 01/22/2024 13.6  7.0 - 25.0 Final    Anion Gap 01/22/2024 12.0  5.0 - 15.0 mmol/L Final    eGFR 01/22/2024 81.8  >60.0 mL/min/1.73 Final    Total Cholesterol 01/22/2024 133  0 - 200 mg/dL Final    Triglycerides 01/22/2024 63  0 - 150 mg/dL Final    HDL Cholesterol 01/22/2024 52  40 - 60 mg/dL Final    LDL Cholesterol  01/22/2024 68  0 - 100 mg/dL Final    VLDL Cholesterol 01/22/2024 13  5 - 40 mg/dL Final    LDL/HDL  Ratio 01/22/2024 1.32   Final    TSH 01/22/2024 3.930  0.270 - 4.200 uIU/mL Final    PSA 01/22/2024 0.864  0.000 - 4.000 ng/mL Final    Vitamin B-12 01/22/2024 550  211 - 946 pg/mL Final    Color, UA 01/22/2024 Yellow  Yellow, Straw Final    Appearance, UA 01/22/2024 Clear  Clear Final    pH, UA 01/22/2024 7.5  5.0 - 8.0 Final    Specific Gravity, UA 01/22/2024 1.008  1.005 - 1.030 Final    Glucose, UA 01/22/2024 Negative  Negative Final    Ketones, UA 01/22/2024 Negative  Negative Final    Bilirubin, UA 01/22/2024 Negative  Negative Final    Blood, UA 01/22/2024 Negative  Negative Final    Protein, UA 01/22/2024 Negative  Negative Final    Leuk Esterase, UA 01/22/2024 Negative  Negative Final    Nitrite, UA 01/22/2024 Negative  Negative Final    Urobilinogen, UA 01/22/2024 0.2 E.U./dL  0.2 - 1.0 E.U./dL Final   Lab on 11/08/2023   Component Date Value Ref Range Status    THC, Screen, Urine 11/08/2023 Negative  Negative Final    Phencyclidine (PCP), Urine 11/08/2023 Negative  Negative Final    Cocaine Screen, Urine 11/08/2023 Negative  Negative Final    Methamphetamine, Ur 11/08/2023 Negative  Negative Final    Opiate Screen 11/08/2023 Negative  Negative Final    Amphetamine Screen, Urine 11/08/2023 Negative  Negative Final    Benzodiazepine Screen, Urine 11/08/2023 Negative  Negative Final    Tricyclic Antidepressants Screen 11/08/2023 Negative  Negative Final    Methadone Screen, Urine 11/08/2023 Negative  Negative Final    Barbiturates Screen, Urine 11/08/2023 Negative  Negative Final    Oxycodone Screen, Urine 11/08/2023 Negative  Negative Final    Buprenorphine, Screen, Urine 11/08/2023 Negative  Negative Final    Fentanyl, Urine 11/08/2023 Negative  Negative Final         Assessment & Plan   Problems Addressed this Visit          Mental Health    Attention deficit hyperactivity disorder (ADHD)    Relevant Medications    FLUoxetine (PROzac) 20 MG capsule     Other Visit Diagnoses       Bipolar affective  disorder, mixed    -  Primary    Relevant Medications    FLUoxetine (PROzac) 20 MG capsule    Seasonal depression        Relevant Medications    FLUoxetine (PROzac) 20 MG capsule    Medication management        Relevant Medications    FLUoxetine (PROzac) 20 MG capsule    Other Relevant Orders    ECG 12 Lead          Diagnoses         Codes Comments    Bipolar affective disorder, mixed    -  Primary ICD-10-CM: F31.60  ICD-9-CM: 296.60     Seasonal depression     ICD-10-CM: F33.8  ICD-9-CM: 296.99     Attention deficit hyperactivity disorder (ADHD), combined type     ICD-10-CM: F90.2  ICD-9-CM: 314.01     Medication management     ICD-10-CM: Z79.899  ICD-9-CM: V58.69             Visit Diagnoses:    ICD-10-CM ICD-9-CM   1. Bipolar affective disorder, mixed  F31.60 296.60   2. Seasonal depression  F33.8 296.99   3. Attention deficit hyperactivity disorder (ADHD), combined type  F90.2 314.01   4. Medication management  Z79.899 V58.69       Discussed briefly alternative options for Vyvanse, however it would be prudent to obtain an EKG prior to moving forward with Adderall.  EKG order placed, patient agreeable.  We will reduce Prozac to 20 mg daily to see if this helps with sexual libido.  Remain upon Lamictal, does not need refills at this point.  Follow up with this provider in 4 weeks or sooner if needed.  Will reach out to patient regarding use of medication for ADHD following EKG being performed.    GOALS:  Short Term Goals: Patient will be compliant with medication, and patient will have no significant medication related side effects.  Patient will be engaged in psychotherapy as indicated.  Patient will report subjective improvement of symptoms.  Long term goals: To stabilize mood and treat/improve subjective symptoms, the patient will stay out of the hospital, the patient will be at an optimal level of functioning, and the patient will take all medications as prescribed.  The patient/guardian verbalized  understanding and agreement with goals that were mutually set.      TREATMENT PLAN: Continue supportive psychotherapy efforts and medications as indicated.  Pharmacological and Non-Pharmacological treatment options discussed during today's visit. Patient/Guardian acknowledged and verbally consented with current treatment plan and was educated on the importance of compliance with treatment and follow-up appointments.      MEDICATION ISSUES:  Discussed medication options and treatment plan of prescribed medication as well as the risks, benefits, any black box warnings, and side effects including potential falls, possible impaired driving, and metabolic adversities among others. Patient is agreeable to call the office with any worsening of symptoms or onset of side effects, or if any concerns or questions arise.  The contact information for the office is made available to the patient. Patient is agreeable to call 911 or go to the nearest ER should they begin having any SI/HI, or if any urgent concerns arise. No medication side effects or related complaints today.     MEDS ORDERED DURING VISIT:  New Medications Ordered This Visit   Medications    FLUoxetine (PROzac) 20 MG capsule     Sig: Take 1 capsule by mouth Daily.     Dispense:  30 capsule     Refill:  0       Follow Up Appointment:   Return in about 4 weeks (around 4/10/2024) for Recheck.             This document has been electronically signed by DEVEN Murillo  March 13, 2024 15:10 EDT    Some of the data in this electronic note has been brought forward from a previous encounter, any necessary changes have been made, it has been reviewed by this APRN, and it is accurate.    Dictated Utilizing Dragon Dictation: Part of this note may be an electronic transcription/translation of spoken language to printed text using the Dragon Dictation System.

## 2024-04-06 DIAGNOSIS — Z79.899 MEDICATION MANAGEMENT: ICD-10-CM

## 2024-04-06 DIAGNOSIS — F33.8 SEASONAL DEPRESSION: ICD-10-CM

## 2024-04-06 RX ORDER — FLUOXETINE HYDROCHLORIDE 40 MG/1
40 CAPSULE ORAL DAILY
Qty: 90 CAPSULE | Refills: 0 | OUTPATIENT
Start: 2024-04-06

## 2024-04-11 ENCOUNTER — TELEMEDICINE (OUTPATIENT)
Dept: PSYCHIATRY | Facility: CLINIC | Age: 49
End: 2024-04-11
Payer: COMMERCIAL

## 2024-04-11 DIAGNOSIS — F31.60 BIPOLAR AFFECTIVE DISORDER, MIXED: Primary | ICD-10-CM

## 2024-04-11 DIAGNOSIS — F41.9 ANXIETY: ICD-10-CM

## 2024-04-11 DIAGNOSIS — F90.2 ATTENTION DEFICIT HYPERACTIVITY DISORDER (ADHD), COMBINED TYPE: ICD-10-CM

## 2024-04-11 DIAGNOSIS — F33.8 SEASONAL DEPRESSION: ICD-10-CM

## 2024-04-11 DIAGNOSIS — Z79.899 MEDICATION MANAGEMENT: ICD-10-CM

## 2024-04-11 RX ORDER — BUSPIRONE HYDROCHLORIDE 10 MG/1
10 TABLET ORAL 2 TIMES DAILY
Qty: 60 TABLET | Refills: 0 | Status: SHIPPED | OUTPATIENT
Start: 2024-04-11

## 2024-04-11 RX ORDER — FLUOXETINE HYDROCHLORIDE 40 MG/1
40 CAPSULE ORAL DAILY
Qty: 90 CAPSULE | Refills: 0 | Status: SHIPPED | OUTPATIENT
Start: 2024-04-11

## 2024-04-11 RX ORDER — LAMOTRIGINE 100 MG/1
100 TABLET, EXTENDED RELEASE ORAL DAILY
Qty: 30 TABLET | Refills: 0 | Status: SHIPPED | OUTPATIENT
Start: 2024-04-11

## 2024-04-11 NOTE — PROGRESS NOTES
"This provider is located at the [facility] (through [facility]), [address], [facility] [place], [address] using a secure video visit through [employer]. Patient is being seen remotely via telehealth at their home address in [place], and stated they are in a secure environment for this session.   The patient's condition being consulted/diagnosed/treated is appropriate for telemedicine. The provider identified herself as well as her credentials.   The patient, and/or patients guardian, consent to be seen remotely, and when consent is given they understand that the consent allows for patient identifiable information to be sent to a third party as needed. They may refuse to be seen remotely at any time. The electronic data is encrypted and password protected, and the patient and/or guardian has been advised of the potential risks to privacy not withstanding such measures.   The use of a video visit has been reviewed with the patient and verbal informed consent has been obtained.   Patient identifiers used: Name and .    Subjective   Ronaldo Catnu is a 48 y.o. male who presents today for follow up    Chief Complaint:    Chief Complaint   Patient presents with    Anxiety    Depression    Med Management        History of Present Illness:   History of Present Illness  Patient is a 48-year-old male presenting for 1 month follow-up related to depression, anxiety, irritability and ADHD.  He voices compliance with use of Prozac, continues to acknowledge decreased libido despite reducing this medication.  Has noticed some breakthrough symptoms regarding irritability \"slamming car doors when I am irritated\" since reducing Prozac.  JACQUELINE increased from 0-1, minimal for anxiety which patient also rates a 0/10, although acknowledges circumstantial anxiety.  Currently pursuing employment to become a , a lot of physical testing involved with this.  Denies SI, HI, SIB, or hallucinations " "currently and is convincing.  PHQ increased from 0-2, minimal for depression which patient rates a 0/10.  Denies sleep or appetite changes or disturbances.  Remains consistent with Lamictal and denies mood lability.  Medically unfortunately he has not been able to have an EKG performed, has appointment tomorrow with PCP and may ask them to perform it at that time.  Since the sexual side effects have not resolved with reduction of Prozac, patient asks for increase today.  Regarding concentration \"I cannot focus.\"    Patient resides in a home with his wife of 8 years whom he cites as supportive.  He has 1 biological child and 2 stepchildren, all adults.  He has a college degree in biology and is now retired.  Recently purchased too many capsules.  Studying for gameboard exam.  [employer] preference.  Enjoys golfing.  Denies drug or alcohol use.       The following portions of the patient's history were reviewed and updated as appropriate: allergies, current medications, past family history, past medical history, past social history, past surgical history and problem list.    Past Psychiatric History:  Began Treatment: 7 years ago for anger management  Diagnoses: unsure  Psychiatrist:Denies  Therapist: previously  Admission History:Denies  Medication Trials: effexor straterra (more irritable) and quelbree( suicidal), clonidine (increased thirst), wellbutrin (made worse), vyvanse (side effects-jaw clenching)  Self Harm: Denies  Suicide Attempts:Denies   Psychosis, Anxiety, Depression:  N/A    Past Medical History:  Past Medical History:   Diagnosis Date    Nephrolithiasis        Substance Abuse History:   Types:Denies all, including illicit  Withdrawal Symptoms:Denies  Longest Period Sober:Not Applicable   AA: Not applicable     Social History:  Social History     Socioeconomic History    Marital status:     Number of children: 3   Tobacco Use    Smoking status: Never    Smokeless tobacco: Never "   Substance and Sexual Activity    Alcohol use: Yes     Alcohol/week: 1.0 standard drink of alcohol     Types: 1 Cans of beer per week     Comment: 1 per day    Drug use: Never       Family History:  Family History   Problem Relation Age of Onset    Heart attack Mother     Suicide Attempts Sister     Cancer Maternal Grandmother     Lung cancer Paternal Grandfather        Past Surgical History:  Past Surgical History:   Procedure Laterality Date    APPENDECTOMY  09/2010    CARDIAC ABLATION  11/2019    CERVICAL FUSION  10/2012    KNEE ARTHROPLASTY, PARTIAL REPLACEMENT  12/2022    KNEE ARTHROSCOPY  05/2021    SHOULDER ARTHROSCOPY         Problem List:  Patient Active Problem List   Diagnosis    Chronic atrial fibrillation    Mixed hyperlipidemia    Primary osteoarthritis of both knees    Degeneration of lumbar or lumbosacral intervertebral disc    Toenail fungus    Bipolar affective disorder    Depression    Attention deficit hyperactivity disorder (ADHD)       Allergy:   Allergies   Allergen Reactions    Viloxazine Hcl Er Mental Status Change     Suicidal thoughts        Current Medications:   Current Outpatient Medications   Medication Sig Dispense Refill    FLUoxetine (PROzac) 40 MG capsule Take 1 capsule by mouth Daily. 90 capsule 0    atorvastatin (LIPITOR) 20 MG tablet TAKE 1 TABLET BY MOUTH EVERY DAY IN THE EVENING 90 tablet 3    busPIRone (BUSPAR) 10 MG tablet Take 1 tablet by mouth 2 (Two) Times a Day. 60 tablet 0    lamoTRIgine ER (LaMICtal XR) 100 MG tablet sustained-release 24 hour Take 1 tablet by mouth Daily. 30 tablet 0    tiZANidine (ZANAFLEX) 4 MG tablet        No current facility-administered medications for this visit.       Review of Systems:    Review of Systems   Constitutional: Negative.    HENT: Negative.     Eyes: Negative.    Respiratory: Negative.     Cardiovascular: Negative.    Gastrointestinal: Negative.    Endocrine: Negative.    Genitourinary: Negative.         Kidney stone    Musculoskeletal:  Positive for back pain.   Skin: Negative.    Allergic/Immunologic: Negative.    Neurological: Negative.  Negative for seizures.   Hematological: Negative.    Psychiatric/Behavioral:  Positive for positive for hyperactivity. The patient is nervous/anxious.          Physical Exam:   Physical Exam  Constitutional:       Appearance: Normal appearance. He is normal weight.   HENT:      Head: Normocephalic.      Nose: Nose normal.   Pulmonary:      Effort: Pulmonary effort is normal.   Musculoskeletal:         General: Normal range of motion.      Cervical back: Normal range of motion.   Neurological:      General: No focal deficit present.      Mental Status: He is alert and oriented to person, place, and time. Mental status is at baseline.   Psychiatric:         Attention and Perception: Attention and perception normal.         Mood and Affect: Affect normal. Mood is anxious.         Speech: Speech normal.         Behavior: Behavior normal. Behavior is cooperative.         Thought Content: Thought content normal.         Cognition and Memory: Cognition and memory normal.         Judgment: Judgment normal.     Vitals:  There were no vitals taken for this visit. There is no height or weight on file to calculate BMI.  Due to extenuating circumstances and possible current health risks associated with the patient being present in a clinical setting (with current health restrictions in place in regards to possible COVID 19 transmission/exposure), the patient was seen remotely today via a MyChart Video Visit through [employer].  Unable to obtain vital signs due to nature of remote visit.  Height stated at 6ft1 inches.  Weight stated at 183 pounds.    Last 3 Blood Pressure Readings:  BP Readings from Last 3 Encounters:   01/20/24 120/68   06/19/23 122/68   01/16/23 114/68     PHQ-9 Depression Screening  Little interest or pleasure in doing things? (P) 0-->not at all   Feeling down, depressed, or hopeless? (P)  0-->not at all   Trouble falling or staying asleep, or sleeping too much? (P) 0-->not at all   Feeling tired or having little energy? (P) 0-->not at all   Poor appetite or overeating? (P) 1-->several days   Feeling bad about yourself - or that you are a failure or have let yourself or your family down? (P) 0-->not at all   Trouble concentrating on things, such as reading the newspaper or watching television? (P) 1-->several days   Moving or speaking so slowly that other people could have noticed? Or the opposite - being so fidgety or restless that you have been moving around a lot more than usual? (P) 0-->not at all   Thoughts that you would be better off dead, or of hurting yourself in some way? (P) 0-->not at all   PHQ-9 Total Score (P) 2   If you checked off any problems, how difficult have these problems made it for you to do your work, take care of things at home, or get along with other people? (P) not difficult at all     JACQUELINE: 1    Mental Status Exam:   Hygiene:   good  Cooperation:  Cooperative  Eye Contact:  Good  Psychomotor Behavior:  Restless  Affect:  Appropriate  Mood: normal  Hopelessness: Denies  Speech:  Normal  Thought Process:  Goal directed  Thought Content:  Normal  Suicidal:  None  Homicidal:  None  Hallucinations:  None  Delusion:  None  Memory:  Intact  Orientation:  Grossly intact  Reliability:  good  Insight:  Good  Judgement:  Fair  Impulse Control:  Fair  Physical/Medical Issues:   back surgeries, knee surgeries, cardiac ablation 2019        Lab Results:   Orders Only on 01/22/2024   Component Date Value Ref Range Status    Hemoglobin A1C 01/22/2024 5.70 (H)  4.80 - 5.60 % Final   Office Visit on 01/20/2024   Component Date Value Ref Range Status    WBC 01/22/2024 6.01  3.40 - 10.80 10*3/mm3 Final    RBC 01/22/2024 4.91  4.14 - 5.80 10*6/mm3 Final    Hemoglobin 01/22/2024 14.6  13.0 - 17.7 g/dL Final    Hematocrit 01/22/2024 43.0  37.5 - 51.0 % Final    MCV 01/22/2024 87.6  79.0 - 97.0 fL  Final    MCH 01/22/2024 29.7  26.6 - 33.0 pg Final    MCHC 01/22/2024 34.0  31.5 - 35.7 g/dL Final    RDW 01/22/2024 13.1  12.3 - 15.4 % Final    RDW-SD 01/22/2024 41.7  37.0 - 54.0 fl Final    MPV 01/22/2024 11.2  6.0 - 12.0 fL Final    Platelets 01/22/2024 174  140 - 450 10*3/mm3 Final    Glucose 01/22/2024 107 (H)  65 - 99 mg/dL Final    BUN 01/22/2024 15  6 - 20 mg/dL Final    Creatinine 01/22/2024 1.10  0.76 - 1.27 mg/dL Final    Sodium 01/22/2024 143  136 - 145 mmol/L Final    Potassium 01/22/2024 3.7  3.5 - 5.2 mmol/L Final    Chloride 01/22/2024 104  98 - 107 mmol/L Final    CO2 01/22/2024 27.0  22.0 - 29.0 mmol/L Final    Calcium 01/22/2024 9.4  8.6 - 10.5 mg/dL Final    Total Protein 01/22/2024 7.1  6.0 - 8.5 g/dL Final    Albumin 01/22/2024 4.5  3.5 - 5.2 g/dL Final    ALT (SGPT) 01/22/2024 25  1 - 41 U/L Final    AST (SGOT) 01/22/2024 20  1 - 40 U/L Final    Alkaline Phosphatase 01/22/2024 80  39 - 117 U/L Final    Total Bilirubin 01/22/2024 0.2  0.0 - 1.2 mg/dL Final    Globulin 01/22/2024 2.6  gm/dL Final    A/G Ratio 01/22/2024 1.7  g/dL Final    BUN/Creatinine Ratio 01/22/2024 13.6  7.0 - 25.0 Final    Anion Gap 01/22/2024 12.0  5.0 - 15.0 mmol/L Final    eGFR 01/22/2024 81.8  >60.0 mL/min/1.73 Final    Total Cholesterol 01/22/2024 133  0 - 200 mg/dL Final    Triglycerides 01/22/2024 63  0 - 150 mg/dL Final    HDL Cholesterol 01/22/2024 52  40 - 60 mg/dL Final    LDL Cholesterol  01/22/2024 68  0 - 100 mg/dL Final    VLDL Cholesterol 01/22/2024 13  5 - 40 mg/dL Final    LDL/HDL Ratio 01/22/2024 1.32   Final    TSH 01/22/2024 3.930  0.270 - 4.200 uIU/mL Final    PSA 01/22/2024 0.864  0.000 - 4.000 ng/mL Final    Vitamin B-12 01/22/2024 550  211 - 946 pg/mL Final    Color, UA 01/22/2024 Yellow  Yellow, Straw Final    Appearance, UA 01/22/2024 Clear  Clear Final    pH, UA 01/22/2024 7.5  5.0 - 8.0 Final    Specific Gravity, UA 01/22/2024 1.008  1.005 - 1.030 Final    Glucose, UA 01/22/2024 Negative   Negative Final    Ketones, UA 01/22/2024 Negative  Negative Final    Bilirubin, UA 01/22/2024 Negative  Negative Final    Blood, UA 01/22/2024 Negative  Negative Final    Protein, UA 01/22/2024 Negative  Negative Final    Leuk Esterase, UA 01/22/2024 Negative  Negative Final    Nitrite, UA 01/22/2024 Negative  Negative Final    Urobilinogen, UA 01/22/2024 0.2 E.U./dL  0.2 - 1.0 E.U./dL Final   Lab on 11/08/2023   Component Date Value Ref Range Status    THC, Screen, Urine 11/08/2023 Negative  Negative Final    Phencyclidine (PCP), Urine 11/08/2023 Negative  Negative Final    Cocaine Screen, Urine 11/08/2023 Negative  Negative Final    Methamphetamine, Ur 11/08/2023 Negative  Negative Final    Opiate Screen 11/08/2023 Negative  Negative Final    Amphetamine Screen, Urine 11/08/2023 Negative  Negative Final    Benzodiazepine Screen, Urine 11/08/2023 Negative  Negative Final    Tricyclic Antidepressants Screen 11/08/2023 Negative  Negative Final    Methadone Screen, Urine 11/08/2023 Negative  Negative Final    Barbiturates Screen, Urine 11/08/2023 Negative  Negative Final    Oxycodone Screen, Urine 11/08/2023 Negative  Negative Final    Buprenorphine, Screen, Urine 11/08/2023 Negative  Negative Final    Fentanyl, Urine 11/08/2023 Negative  Negative Final         Assessment & Plan   Problems Addressed this Visit          Mental Health    Attention deficit hyperactivity disorder (ADHD)    Relevant Medications    FLUoxetine (PROzac) 40 MG capsule    busPIRone (BUSPAR) 10 MG tablet     Other Visit Diagnoses       Bipolar affective disorder, mixed    -  Primary    Relevant Medications    FLUoxetine (PROzac) 40 MG capsule    lamoTRIgine ER (LaMICtal XR) 100 MG tablet sustained-release 24 hour    busPIRone (BUSPAR) 10 MG tablet    Seasonal depression        Relevant Medications    FLUoxetine (PROzac) 40 MG capsule    busPIRone (BUSPAR) 10 MG tablet    Anxiety        Relevant Medications    busPIRone (BUSPAR) 10 MG tablet     Medication management        Relevant Medications    FLUoxetine (PROzac) 40 MG capsule    lamoTRIgine ER (LaMICtal XR) 100 MG tablet sustained-release 24 hour    busPIRone (BUSPAR) 10 MG tablet          Diagnoses         Codes Comments    Bipolar affective disorder, mixed    -  Primary ICD-10-CM: F31.60  ICD-9-CM: 296.60     Seasonal depression     ICD-10-CM: F33.8  ICD-9-CM: 296.99     Anxiety     ICD-10-CM: F41.9  ICD-9-CM: 300.00     Attention deficit hyperactivity disorder (ADHD), combined type     ICD-10-CM: F90.2  ICD-9-CM: 314.01     Medication management     ICD-10-CM: Z79.899  ICD-9-CM: V58.69             Visit Diagnoses:    ICD-10-CM ICD-9-CM   1. Bipolar affective disorder, mixed  F31.60 296.60   2. Seasonal depression  F33.8 296.99   3. Anxiety  F41.9 300.00   4. Attention deficit hyperactivity disorder (ADHD), combined type  F90.2 314.01   5. Medication management  Z79.899 V58.69     Prozac increased to 40 mg daily.  Lamictal form changed to XR as patient has been on therapeutic dose of 100 mg for several months now.  2 weeks following increase to Prozac, patient to begin BuSpar 10 mg twice daily.  Off-label use of this medication could reduce sexual side effects. Patient is educated upon risks versus benefits as well as side effects and when to seek care in an emergency setting.  Patient verbalized understanding.  Reminded of EKG needing to be performed, plan of care to be determined regarding these results to address concentration deficits.  Follow up with this provider in 4 weeks or sooner if needed.    GOALS:  Short Term Goals: Patient will be compliant with medication, and patient will have no significant medication related side effects.  Patient will be engaged in psychotherapy as indicated.  Patient will report subjective improvement of symptoms.  Long term goals: To stabilize mood and treat/improve subjective symptoms, the patient will stay out of the hospital, the patient will be at an optimal  level of functioning, and the patient will take all medications as prescribed.  The patient/guardian verbalized understanding and agreement with goals that were mutually set.      TREATMENT PLAN: Continue supportive psychotherapy efforts and medications as indicated.  Pharmacological and Non-Pharmacological treatment options discussed during today's visit. Patient/Guardian acknowledged and verbally consented with current treatment plan and was educated on the importance of compliance with treatment and follow-up appointments.      MEDICATION ISSUES:  Discussed medication options and treatment plan of prescribed medication as well as the risks, benefits, any black box warnings, and side effects including potential falls, possible impaired driving, and metabolic adversities among others. Patient is agreeable to call the office with any worsening of symptoms or onset of side effects, or if any concerns or questions arise.  The contact information for the office is made available to the patient. Patient is agreeable to call 911 or go to the nearest ER should they begin having any SI/HI, or if any urgent concerns arise. No medication side effects or related complaints today.     MEDS ORDERED DURING VISIT:  New Medications Ordered This Visit   Medications    FLUoxetine (PROzac) 40 MG capsule     Sig: Take 1 capsule by mouth Daily.     Dispense:  90 capsule     Refill:  0    lamoTRIgine ER (LaMICtal XR) 100 MG tablet sustained-release 24 hour     Sig: Take 1 tablet by mouth Daily.     Dispense:  30 tablet     Refill:  0    busPIRone (BUSPAR) 10 MG tablet     Sig: Take 1 tablet by mouth 2 (Two) Times a Day.     Dispense:  60 tablet     Refill:  0       Follow Up Appointment:   Return in about 4 weeks (around 5/9/2024) for Recheck.             This document has been electronically signed by DEVEN Murillo  April 11, 2024 14:08 EDT    Some of the data in this electronic note has been brought forward from a previous  encounter, any necessary changes have been made, it has been reviewed by this APRN, and it is accurate.    Dictated Utilizing Dragon Dictation: Part of this note may be an electronic transcription/translation of spoken language to printed text using the Dragon Dictation System.

## 2024-04-12 ENCOUNTER — HOSPITAL ENCOUNTER (OUTPATIENT)
Dept: CARDIOLOGY | Facility: HOSPITAL | Age: 49
Discharge: HOME | End: 2024-04-12
Payer: COMMERCIAL

## 2024-04-12 ENCOUNTER — OFFICE VISIT (OUTPATIENT)
Dept: INTERNAL MEDICINE | Facility: CLINIC | Age: 49
End: 2024-04-12
Payer: COMMERCIAL

## 2024-04-12 VITALS — HEIGHT: 73 IN | BODY MASS INDEX: 24.66 KG/M2 | WEIGHT: 186.07 LBS

## 2024-04-12 VITALS
BODY MASS INDEX: 24.65 KG/M2 | SYSTOLIC BLOOD PRESSURE: 108 MMHG | HEIGHT: 73 IN | TEMPERATURE: 97.3 F | HEART RATE: 88 BPM | WEIGHT: 186 LBS | DIASTOLIC BLOOD PRESSURE: 70 MMHG | OXYGEN SATURATION: 98 %

## 2024-04-12 DIAGNOSIS — R94.31 ABNORMAL EKG: ICD-10-CM

## 2024-04-12 DIAGNOSIS — E78.2 MIXED HYPERLIPIDEMIA: ICD-10-CM

## 2024-04-12 DIAGNOSIS — Z02.1 PHYSICAL EXAM, PRE-EMPLOYMENT: ICD-10-CM

## 2024-04-12 DIAGNOSIS — F90.0 ATTENTION DEFICIT HYPERACTIVITY DISORDER (ADHD), PREDOMINANTLY INATTENTIVE TYPE: Primary | ICD-10-CM

## 2024-04-12 DIAGNOSIS — F31.75 BIPOLAR DISORDER, IN PARTIAL REMISSION, MOST RECENT EPISODE DEPRESSED: ICD-10-CM

## 2024-04-12 LAB
BH CV ECHO MEAS - AO MAX PG: 6.3 MMHG
BH CV ECHO MEAS - AO MEAN PG: 3 MMHG
BH CV ECHO MEAS - AO ROOT DIAM: 3.7 CM
BH CV ECHO MEAS - AO V2 MAX: 125 CM/SEC
BH CV ECHO MEAS - AO V2 VTI: 22.6 CM
BH CV ECHO MEAS - AVA(I,D): 2.2 CM2
BH CV ECHO MEAS - EDV(CUBED): 117.6 ML
BH CV ECHO MEAS - EDV(MOD-SP2): 96.6 ML
BH CV ECHO MEAS - EDV(MOD-SP4): 108 ML
BH CV ECHO MEAS - EF(MOD-BP): 57 %
BH CV ECHO MEAS - EF(MOD-SP2): 57.6 %
BH CV ECHO MEAS - EF(MOD-SP4): 56.9 %
BH CV ECHO MEAS - ESV(CUBED): 32.8 ML
BH CV ECHO MEAS - ESV(MOD-SP2): 41 ML
BH CV ECHO MEAS - ESV(MOD-SP4): 46.5 ML
BH CV ECHO MEAS - FS: 34.7 %
BH CV ECHO MEAS - IVS/LVPW: 1 CM
BH CV ECHO MEAS - IVSD: 0.8 CM
BH CV ECHO MEAS - LA DIMENSION: 3.3 CM
BH CV ECHO MEAS - LAT PEAK E' VEL: 13.1 CM/SEC
BH CV ECHO MEAS - LV DIASTOLIC VOL/BSA (35-75): 51.8 CM2
BH CV ECHO MEAS - LV MASS(C)D: 131.2 GRAMS
BH CV ECHO MEAS - LV MAX PG: 3.1 MMHG
BH CV ECHO MEAS - LV MEAN PG: 2 MMHG
BH CV ECHO MEAS - LV SYSTOLIC VOL/BSA (12-30): 22.3 CM2
BH CV ECHO MEAS - LV V1 MAX: 88.7 CM/SEC
BH CV ECHO MEAS - LV V1 VTI: 15.8 CM
BH CV ECHO MEAS - LVIDD: 4.9 CM
BH CV ECHO MEAS - LVIDS: 3.2 CM
BH CV ECHO MEAS - LVOT AREA: 3.1 CM2
BH CV ECHO MEAS - LVOT DIAM: 2 CM
BH CV ECHO MEAS - LVPWD: 0.8 CM
BH CV ECHO MEAS - MED PEAK E' VEL: 10.8 CM/SEC
BH CV ECHO MEAS - MV A MAX VEL: 40.8 CM/SEC
BH CV ECHO MEAS - MV DEC SLOPE: 268 CM/SEC2
BH CV ECHO MEAS - MV DEC TIME: 0.19 SEC
BH CV ECHO MEAS - MV E MAX VEL: 46.3 CM/SEC
BH CV ECHO MEAS - MV E/A: 1.13
BH CV ECHO MEAS - MV MAX PG: 1.29 MMHG
BH CV ECHO MEAS - MV MEAN PG: 1 MMHG
BH CV ECHO MEAS - MV P1/2T: 67.5 MSEC
BH CV ECHO MEAS - MV V2 VTI: 16.8 CM
BH CV ECHO MEAS - MVA(P1/2T): 3.3 CM2
BH CV ECHO MEAS - MVA(VTI): 3 CM2
BH CV ECHO MEAS - PA ACC TIME: 0.14 SEC
BH CV ECHO MEAS - SI(MOD-SP2): 26.6 ML/M2
BH CV ECHO MEAS - SI(MOD-SP4): 29.5 ML/M2
BH CV ECHO MEAS - SV(LVOT): 49.6 ML
BH CV ECHO MEAS - SV(MOD-SP2): 55.6 ML
BH CV ECHO MEAS - SV(MOD-SP4): 61.5 ML
BH CV ECHO MEAS - TAPSE (>1.6): 2.9 CM
BH CV ECHO MEASUREMENTS AVERAGE E/E' RATIO: 3.87
BH CV VAS BP RIGHT ARM: NORMAL MMHG
BH CV XLRA - RV BASE: 2.8 CM
BH CV XLRA - RV LENGTH: 7.7 CM
BH CV XLRA - RV MID: 2.2 CM
BH CV XLRA - TDI S': 11.5 CM/SEC
LEFT ATRIUM VOLUME INDEX: 22.8 ML/M2

## 2024-04-12 PROCEDURE — 93306 TTE W/DOPPLER COMPLETE: CPT

## 2024-04-12 NOTE — PROGRESS NOTES
[facility]  [address]  [place], located within [facility] [address]  Dept: [phone]  Dept Fax: [phone]  Loc: [phone]  Loc Fax: [phone]    Ronaldo WALL Alondra  1975    Follow Up Office Visit Note    History of Present Illness:  Patient is a 48-year-old male in today for preemployment physical and paperwork to be filled out.  Patient needing EKG requested by psychiatry.  Patient on Prozac, BuSpar, and Lamictal for ADHD and bipolar disorder.  Taking as directed without any problems or side effects.  Patient has seen cardiology in the past.  He saw for A-fib and had ablation years ago.  Denies any cardiac symptoms.  Patient is applying to be a .  Having to do a rigorous physical testing.    The following portions of the patient's history were reviewed and updated as appropriate: allergies, current medications, past family history, past medical history, past social history, past surgical history, and problem list.    Medications:    Current Outpatient Medications:     atorvastatin (LIPITOR) 20 MG tablet, TAKE 1 TABLET BY MOUTH EVERY DAY IN THE EVENING, Disp: 90 tablet, Rfl: 3    busPIRone (BUSPAR) 10 MG tablet, Take 1 tablet by mouth 2 (Two) Times a Day., Disp: 60 tablet, Rfl: 0    FLUoxetine (PROzac) 40 MG capsule, Take 1 capsule by mouth Daily., Disp: 90 capsule, Rfl: 0    lamoTRIgine ER (LaMICtal XR) 100 MG tablet sustained-release 24 hour, Take 1 tablet by mouth Daily., Disp: 30 tablet, Rfl: 0    tiZANidine (ZANAFLEX) 4 MG tablet, , Disp: , Rfl:     Subjective  Allergies   Allergen Reactions    Viloxazine Hcl Er Mental Status Change     Suicidal thoughts        Past Medical History:   Diagnosis Date    Nephrolithiasis        Past Surgical History:   Procedure Laterality Date    APPENDECTOMY  09/2010    CARDIAC ABLATION  11/2019    CERVICAL FUSION  10/2012    KNEE ARTHROPLASTY, PARTIAL REPLACEMENT  12/2022    KNEE ARTHROSCOPY  05/2021    SHOULDER  "ARTHROSCOPY         Family History   Problem Relation Age of Onset    Heart attack Mother     Suicide Attempts Sister     Cancer Maternal Grandmother     Lung cancer Paternal Grandfather         Social History     Socioeconomic History    Marital status:     Number of children: 3   Tobacco Use    Smoking status: Never    Smokeless tobacco: Never   Vaping Use    Vaping status: Never Used   Substance and Sexual Activity    Alcohol use: Yes     Alcohol/week: 1.0 standard drink of alcohol     Types: 1 Cans of beer per week     Comment: 1 per day    Drug use: Never    Sexual activity: Defer       Review of Systems   Constitutional:  Negative for activity change, chills, fatigue, fever and unexpected weight change.   HENT:  Negative for congestion, ear pain, postnasal drip, sinus pressure and sore throat.    Eyes:  Negative for pain, discharge and redness.   Respiratory:  Negative for cough, shortness of breath and wheezing.    Cardiovascular:  Negative for chest pain, palpitations and leg swelling.   Gastrointestinal:  Negative for diarrhea, nausea and vomiting.   Endocrine: Negative for cold intolerance and heat intolerance.   Genitourinary:  Negative for decreased urine volume and dysuria.   Musculoskeletal:  Negative for arthralgias and myalgias.   Skin:  Negative for rash and wound.   Neurological:  Negative for dizziness, light-headedness and headaches.   Hematological:  Does not bruise/bleed easily.   Psychiatric/Behavioral:  Negative for confusion, dysphoric mood and sleep disturbance. The patient is not nervous/anxious.        Objective  Vitals:    04/12/24 0951   BP: 108/70   BP Location: Left arm   Patient Position: Sitting   Cuff Size: Large Adult   Pulse: 88   Temp: 97.3 °F (36.3 °C)   TempSrc: Temporal   SpO2: 98%   Weight: 84.4 kg (186 lb)   Height: 185.4 cm (72.99\")     Body mass index is 24.55 kg/m².     Physical Exam  Physical Exam  Vitals and nursing note reviewed.   Constitutional:       " General: He is not in acute distress.     Appearance: He is not ill-appearing.   HENT:      Head: Normocephalic.      Right Ear: Tympanic membrane, ear canal and external ear normal. There is no impacted cerumen.      Left Ear: Tympanic membrane, ear canal and external ear normal. There is no impacted cerumen.      Nose: No congestion or rhinorrhea.      Mouth/Throat:      Mouth: Mucous membranes are moist.      Pharynx: Oropharynx is clear. No oropharyngeal exudate or posterior oropharyngeal erythema.   Eyes:      General:         Right eye: No discharge.         Left eye: No discharge.      Extraocular Movements: Extraocular movements intact.      Conjunctiva/sclera: Conjunctivae normal.      Pupils: Pupils are equal, round, and reactive to light.   Cardiovascular:      Rate and Rhythm: Normal rate and regular rhythm.      Heart sounds: Normal heart sounds. No murmur heard.     No friction rub. No gallop.   Pulmonary:      Effort: Pulmonary effort is normal. No respiratory distress.      Breath sounds: Normal breath sounds. No wheezing.   Abdominal:      General: Bowel sounds are normal. There is no distension.      Palpations: Abdomen is soft. There is no mass.      Tenderness: There is no abdominal tenderness.   Musculoskeletal:         General: No swelling. Normal range of motion.      Cervical back: Normal range of motion. No tenderness.      Right lower leg: No edema.      Left lower leg: No edema.   Lymphadenopathy:      Cervical: No cervical adenopathy.   Skin:     Findings: No bruising, erythema or rash.   Neurological:      Mental Status: He is oriented to person, place, and time.      Gait: Gait normal.   Psychiatric:         Mood and Affect: Mood normal.         Behavior: Behavior normal.         Thought Content: Thought content normal.         Judgment: Judgment normal.     Diagnostic Data    ECG 12 Lead    Date/Time: 4/12/2024 10:29 AM  Performed by: Gasper Waterman PA-C    Authorized by:  Gasper Waterman PA-C  Comparison: compared with previous ECG   Comparison to previous ECG: New EKG shows probable inferior MI with some Q waves in II and aVF.  Rhythm: sinus rhythm  Rate: normal  QRS axis: left    Clinical impression: abnormal EKG      Assessment  Diagnoses and all orders for this visit:    1. Attention deficit hyperactivity disorder (ADHD), predominantly inattentive type (Primary)    2. Mixed hyperlipidemia    3. Abnormal EKG  -     Adult Transthoracic Echo Complete W/ Cont if Necessary Per Protocol; Future    4. Physical exam, pre-employment        Plan    1. Attention deficit hyperactivity disorder (ADHD), predominantly inattentive type (Primary)- follow psych.  Well-controlled on current medication regimen    2. Mixed hyperlipidemia- on atorvastatin.    3. Abnormal EKG- EKG in office shows sinus rhythm with marked left axis deviation and probable inferior MI with Q waves in 2 and aVF.  Negative for acute ST changes.  Advised for any development of cardiac symptoms to call 911 immediately. Patient verbalized understanding of all instructions given and complied. Discussed case with patient's PCP.  Ordered stat echo.    4. Physical exam, pre-employment- hold off clearing and completing paperwork until results of echo come back.  May need cardiac follow-up.      Return for Next scheduled follow up.    Gasper Waterman PA-C  04/12/2024

## 2024-04-15 ENCOUNTER — TELEPHONE (OUTPATIENT)
Dept: PSYCHIATRY | Facility: CLINIC | Age: 49
End: 2024-04-15
Payer: COMMERCIAL

## 2024-04-15 DIAGNOSIS — F90.2 ATTENTION DEFICIT HYPERACTIVITY DISORDER (ADHD), COMBINED TYPE: Primary | ICD-10-CM

## 2024-04-15 RX ORDER — LISDEXAMFETAMINE DIMESYLATE CAPSULES 60 MG/1
60 CAPSULE ORAL EVERY MORNING
Qty: 30 CAPSULE | Refills: 0 | Status: SHIPPED | OUTPATIENT
Start: 2024-04-15

## 2024-04-15 NOTE — TELEPHONE ENCOUNTER
Patient called stating that he and his wife have discussed it and he would prefer to go back on his previous medications.  He would like to go back to vyvanse, prozac, and lamictal.  Patient states that there is too much inconsistency and he would like to go back to what was working for him.  Please advise.

## 2024-04-23 RX ORDER — LAMOTRIGINE 25 MG/1
TABLET ORAL
Qty: 360 TABLET | OUTPATIENT
Start: 2024-04-23

## 2024-04-23 NOTE — TELEPHONE ENCOUNTER
Pt has not picked up the 100 mg yet due to some insurance issues. Pt stated that he will be getting the medication soon.

## 2024-05-02 DIAGNOSIS — Z79.899 MEDICATION MANAGEMENT: ICD-10-CM

## 2024-05-02 DIAGNOSIS — F31.60 BIPOLAR AFFECTIVE DISORDER, MIXED: ICD-10-CM

## 2024-05-02 DIAGNOSIS — F33.8 SEASONAL DEPRESSION: ICD-10-CM

## 2024-05-02 RX ORDER — FLUOXETINE HYDROCHLORIDE 40 MG/1
40 CAPSULE ORAL DAILY
Qty: 90 CAPSULE | Refills: 0 | Status: SHIPPED | OUTPATIENT
Start: 2024-05-02

## 2024-05-02 RX ORDER — LAMOTRIGINE 100 MG/1
100 TABLET, EXTENDED RELEASE ORAL DAILY
Qty: 30 TABLET | Refills: 0 | Status: SHIPPED | OUTPATIENT
Start: 2024-05-02

## 2024-05-02 NOTE — TELEPHONE ENCOUNTER
Patient left message on med line requesting refill on Lamictal and Prozac and have it sent to [facility].

## 2024-05-14 ENCOUNTER — TELEMEDICINE (OUTPATIENT)
Dept: PSYCHIATRY | Facility: CLINIC | Age: 49
End: 2024-05-14
Payer: COMMERCIAL

## 2024-05-14 DIAGNOSIS — Z79.899 MEDICATION MANAGEMENT: ICD-10-CM

## 2024-05-14 DIAGNOSIS — F33.8 SEASONAL DEPRESSION: ICD-10-CM

## 2024-05-14 DIAGNOSIS — F31.60 BIPOLAR AFFECTIVE DISORDER, MIXED: ICD-10-CM

## 2024-05-14 DIAGNOSIS — F90.2 ATTENTION DEFICIT HYPERACTIVITY DISORDER (ADHD), COMBINED TYPE: Primary | ICD-10-CM

## 2024-05-14 PROCEDURE — 99214 OFFICE O/P EST MOD 30 MIN: CPT

## 2024-05-14 RX ORDER — DEXTROAMPHETAMINE SACCHARATE, AMPHETAMINE ASPARTATE, DEXTROAMPHETAMINE SULFATE AND AMPHETAMINE SULFATE 3.125; 3.125; 3.125; 3.125 MG/1; MG/1; MG/1; MG/1
12.5 TABLET ORAL DAILY
Qty: 30 TABLET | Refills: 0 | Status: SHIPPED | OUTPATIENT
Start: 2024-05-14 | End: 2024-05-20 | Stop reason: DRUGHIGH

## 2024-05-14 RX ORDER — LAMOTRIGINE 100 MG/1
100 TABLET, EXTENDED RELEASE ORAL DAILY
Qty: 90 TABLET | Refills: 0 | Status: SHIPPED | OUTPATIENT
Start: 2024-05-14

## 2024-05-14 RX ORDER — FLUOXETINE HYDROCHLORIDE 40 MG/1
40 CAPSULE ORAL DAILY
Qty: 90 CAPSULE | Refills: 0 | Status: SHIPPED | OUTPATIENT
Start: 2024-05-14

## 2024-05-14 NOTE — PROGRESS NOTES
"This provider is located at the [facility] (through [facility]), [address], [facility], [address] using a secure video visit through [employer]. Patient is being seen remotely via telehealth at their home address in [place], and stated they are in a secure environment for this session.   The patient's condition being consulted/diagnosed/treated is appropriate for telemedicine. The provider identified herself as well as her credentials.   The patient, and/or patients guardian, consent to be seen remotely, and when consent is given they understand that the consent allows for patient identifiable information to be sent to a third party as needed. They may refuse to be seen remotely at any time. The electronic data is encrypted and password protected, and the patient and/or guardian has been advised of the potential risks to privacy not withstanding such measures.   The use of a video visit has been reviewed with the patient and verbal informed consent has been obtained.   Patient identifiers used: Name and .    Subjective   Ronaldo Cantu is a 48 y.o. male who presents today for follow up    Chief Complaint:    Chief Complaint   Patient presents with    Anxiety    Depression    ADHD    Med Management        History of Present Illness:   History of Present Illness  Patient is a 48-year-old male presenting for 1 month follow-up related to depression, anxiety, irritability and ADHD.  He voices compliance with medications, denies side effects aside from still having some jaw clenching with use of Vyvanse. This medication continues to improve concentration, patient finds beneficial. Denies mood lability. PHQ reduced from 2 to 0, negative for depression, which patient states \"no, none.\" Has been active and staying busy. JACQUELINE reduced from 1 to 0, negative for anxiety which patient states \"I don't have any anxiety.\" Has tolerated re-initiation of prozac 40 mg well. Remains upon " lamictal, denies mood lability. Denies SI, HI, SIB or hallucinations currently and is convincing. Denies appetite changes. Medically had ECHO unremarkable, is calling to schedule cardiology appointment. Denies irritability. Denies cardiac symptoms.    Patient resides in a home with his wife of 8 years whom he cites as supportive.  He has 1 biological child and 2 stepchildren, all adults.  He has a college degree in biology and is now retired.  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 Rast[employer] preference.  Enjoys golfing.  Denies drug or alcohol use.       The following portions of the patient's history were reviewed and updated as appropriate: allergies, current medications, past family history, past medical history, past social history, past surgical history and problem list.    Past Psychiatric History:  Began Treatment: 7 years ago for anger management  Diagnoses: unsure  Psychiatrist:Denies  Therapist: previously  Admission History:Denies  Medication Trials: effexor straterra (more irritable) and quelbree( suicidal), clonidine (increased thirst), wellbutrin (made worse), vyvanse (side effects-jaw clenching)  Self Harm: Denies  Suicide Attempts:Denies   Psychosis, Anxiety, Depression:  N/A    Past Medical History:  Past Medical History:   Diagnosis Date    Nephrolithiasis        Substance Abuse History:   Types:Denies all, including illicit  Withdrawal Symptoms:Denies  Longest Period Sober:Not Applicable   AA: Not applicable     Social History:  Social History     Socioeconomic History    Marital status:     Number of children: 3   Tobacco Use    Smoking status: Never    Smokeless tobacco: Never   Vaping Use    Vaping status: Never Used   Substance and Sexual Activity    Alcohol use: Yes     Alcohol/week: 1.0 standard drink of alcohol     Types: 1 Cans of beer per week     Comment: 1 per day    Drug use: Never    Sexual activity: Defer       Family History:  Family History   Problem Relation Age of  Onset    Heart attack Mother     Suicide Attempts Sister     Cancer Maternal Grandmother     Lung cancer Paternal Grandfather        Past Surgical History:  Past Surgical History:   Procedure Laterality Date    APPENDECTOMY  09/2010    CARDIAC ABLATION  11/2019    CERVICAL FUSION  10/2012    KNEE ARTHROPLASTY, PARTIAL REPLACEMENT  12/2022    KNEE ARTHROSCOPY  05/2021    SHOULDER ARTHROSCOPY         Problem List:  Patient Active Problem List   Diagnosis    Chronic atrial fibrillation    Mixed hyperlipidemia    Primary osteoarthritis of both knees    Degeneration of lumbar or lumbosacral intervertebral disc    Toenail fungus    Bipolar affective disorder    Depression    Attention deficit hyperactivity disorder (ADHD)       Allergy:   Allergies   Allergen Reactions    Viloxazine Hcl Er Mental Status Change     Suicidal thoughts        Current Medications:   Current Outpatient Medications   Medication Sig Dispense Refill    FLUoxetine (PROzac) 40 MG capsule Take 1 capsule by mouth Daily. 90 capsule 0    lamoTRIgine ER (LaMICtal XR) 100 MG tablet sustained-release 24 hour Take 1 tablet by mouth Daily. 90 tablet 0    amphetamine-dextroamphetamine (Adderall) 12.5 MG tablet Take 1 tablet by mouth Daily. 30 tablet 0    atorvastatin (LIPITOR) 20 MG tablet TAKE 1 TABLET BY MOUTH EVERY DAY IN THE EVENING 90 tablet 3    lisdexamfetamine (Vyvanse) 60 MG capsule Take 1 capsule by mouth Every Morning 30 capsule 0    tiZANidine (ZANAFLEX) 4 MG tablet        No current facility-administered medications for this visit.       Review of Systems:    Review of Systems   Constitutional: Negative.    HENT: Negative.     Eyes: Negative.    Respiratory: Negative.     Cardiovascular: Negative.    Gastrointestinal: Negative.    Endocrine: Negative.    Genitourinary: Negative.         Kidney stone   Musculoskeletal:  Positive for back pain.   Skin: Negative.    Allergic/Immunologic: Negative.    Neurological: Negative.  Negative for seizures.    Hematological: Negative.    Psychiatric/Behavioral:  Positive for positive for hyperactivity. The patient is nervous/anxious.          Physical Exam:   Physical Exam  Constitutional:       Appearance: Normal appearance. He is normal weight.   HENT:      Head: Normocephalic.      Nose: Nose normal.   Pulmonary:      Effort: Pulmonary effort is normal.   Musculoskeletal:         General: Normal range of motion.      Cervical back: Normal range of motion.   Neurological:      General: No focal deficit present.      Mental Status: He is alert and oriented to person, place, and time. Mental status is at baseline.   Psychiatric:         Attention and Perception: Attention and perception normal.         Mood and Affect: Affect normal. Mood is anxious.         Speech: Speech normal.         Behavior: Behavior normal. Behavior is cooperative.         Thought Content: Thought content normal.         Cognition and Memory: Cognition and memory normal.         Judgment: Judgment normal.     Vitals:  There were no vitals taken for this visit. There is no height or weight on file to calculate BMI.  Due to extenuating circumstances and possible current health risks associated with the patient being present in a clinical setting (with current health restrictions in place in regards to possible COVID 19 transmission/exposure), the patient was seen remotely today via a MyChart Video Visit through [employer].  Unable to obtain vital signs due to nature of remote visit.  Height stated at 6ft1 inches.  Weight stated at 183 pounds.    Last 3 Blood Pressure Readings:  BP Readings from Last 3 Encounters:   04/12/24 108/70   01/20/24 120/68   06/19/23 122/68     PHQ-9 Depression Screening  Little interest or pleasure in doing things? (P) 0-->not at all   Feeling down, depressed, or hopeless? (P) 0-->not at all   Trouble falling or staying asleep, or sleeping too much? (P) 0-->not at all   Feeling tired or having little energy? (P) 0-->not at all    Poor appetite or overeating? (P) 0-->not at all   Feeling bad about yourself - or that you are a failure or have let yourself or your family down? (P) 0-->not at all   Trouble concentrating on things, such as reading the newspaper or watching television? (P) 0-->not at all   Moving or speaking so slowly that other people could have noticed? Or the opposite - being so fidgety or restless that you have been moving around a lot more than usual? (P) 0-->not at all   Thoughts that you would be better off dead, or of hurting yourself in some way? (P) 0-->not at all   PHQ-9 Total Score (P) 0   If you checked off any problems, how difficult have these problems made it for you to do your work, take care of things at home, or get along with other people? (P) not difficult at all     JACQUELINE: 0    Mental Status Exam:   Hygiene:   good  Cooperation:  Cooperative  Eye Contact:  Good  Psychomotor Behavior:  Appropriate  Affect:  Appropriate  Mood: normal  Hopelessness: Denies  Speech:  Normal  Thought Process:  Goal directed  Thought Content:  Normal  Suicidal:  None  Homicidal:  None  Hallucinations:  None  Delusion:  None  Memory:  Intact  Orientation:  Grossly intact  Reliability:  good  Insight:  Good  Judgement:  Fair  Impulse Control:  Fair  Physical/Medical Issues:   back surgeries, knee surgeries, cardiac ablation 2019        Lab Results:   Hospital Outpatient Visit on 04/12/2024   Component Date Value Ref Range Status    BH CV VAS BP RIGHT ARM 04/12/2024 114/87  mmHg Final    EF(MOD-bp) 04/12/2024 57.0  % Final    LVIDd 04/12/2024 4.9  cm Final    LVIDs 04/12/2024 3.2  cm Final    IVSd 04/12/2024 0.80  cm Final    LVPWd 04/12/2024 0.80  cm Final    FS 04/12/2024 34.7  % Final    IVS/LVPW 04/12/2024 1.00  cm Final    ESV(cubed) 04/12/2024 32.8  ml Final    LV Sys Vol (BSA corrected) 04/12/2024 22.3  cm2 Final    EDV(cubed) 04/12/2024 117.6  ml Final    LV Garcia Vol (BSA corrected) 04/12/2024 51.8  cm2 Final    LV mass(C)d  04/12/2024 131.2  grams Final    LVOT area 04/12/2024 3.1  cm2 Final    LVOT diam 04/12/2024 2.00  cm Final    EDV(MOD-sp2) 04/12/2024 96.6  ml Final    EDV(MOD-sp4) 04/12/2024 108.0  ml Final    ESV(MOD-sp2) 04/12/2024 41.0  ml Final    ESV(MOD-sp4) 04/12/2024 46.5  ml Final    SV(MOD-sp2) 04/12/2024 55.6  ml Final    SV(MOD-sp4) 04/12/2024 61.5  ml Final    SVi(MOD-SP2) 04/12/2024 26.6  ml/m2 Final    SVi(MOD-SP4) 04/12/2024 29.5  ml/m2 Final    EF(MOD-sp2) 04/12/2024 57.6  % Final    EF(MOD-sp4) 04/12/2024 56.9  % Final    MV E max gilmar 04/12/2024 46.3  cm/sec Final    MV A max gilmar 04/12/2024 40.8  cm/sec Final    MV dec time 04/12/2024 0.19  sec Final    MV E/A 04/12/2024 1.13   Final    LA ESV Index (BP) 04/12/2024 22.8  ml/m2 Final    Med Peak E' Gilmar 04/12/2024 10.8  cm/sec Final    Lat Peak E' Gilmar 04/12/2024 13.1  cm/sec Final    Avg E/e' ratio 04/12/2024 3.87   Final    SV(LVOT) 04/12/2024 49.6  ml Final    RV Base 04/12/2024 2.8  cm Final    RV Mid 04/12/2024 2.20  cm Final    RV Length 04/12/2024 7.7  cm Final    TAPSE (>1.6) 04/12/2024 2.9  cm Final    RV S' 04/12/2024 11.5  cm/sec Final    LA dimension (2D)  04/12/2024 3.3  cm Final    LV V1 max 04/12/2024 88.7  cm/sec Final    LV V1 max PG 04/12/2024 3.1  mmHg Final    LV V1 mean PG 04/12/2024 2.00  mmHg Final    LV V1 VTI 04/12/2024 15.8  cm Final    Ao pk gilmar 04/12/2024 125.0  cm/sec Final    Ao max PG 04/12/2024 6.3  mmHg Final    Ao mean PG 04/12/2024 3.0  mmHg Final    Ao V2 VTI 04/12/2024 22.6  cm Final    CANDIE(I,D) 04/12/2024 2.20  cm2 Final    MV max PG 04/12/2024 1.29  mmHg Final    MV mean PG 04/12/2024 1.00  mmHg Final    MV V2 VTI 04/12/2024 16.8  cm Final    MV P1/2t 04/12/2024 67.5  msec Final    MVA(P1/2t) 04/12/2024 3.3  cm2 Final    MVA(VTI) 04/12/2024 3.0  cm2 Final    MV dec slope 04/12/2024 268.0  cm/sec2 Final    PA acc time 04/12/2024 0.14  sec Final    Ao root diam 04/12/2024 3.7  cm Final   Orders Only on 01/22/2024   Component  Date Value Ref Range Status    Hemoglobin A1C 01/22/2024 5.70 (H)  4.80 - 5.60 % Final   Office Visit on 01/20/2024   Component Date Value Ref Range Status    WBC 01/22/2024 6.01  3.40 - 10.80 10*3/mm3 Final    RBC 01/22/2024 4.91  4.14 - 5.80 10*6/mm3 Final    Hemoglobin 01/22/2024 14.6  13.0 - 17.7 g/dL Final    Hematocrit 01/22/2024 43.0  37.5 - 51.0 % Final    MCV 01/22/2024 87.6  79.0 - 97.0 fL Final    MCH 01/22/2024 29.7  26.6 - 33.0 pg Final    MCHC 01/22/2024 34.0  31.5 - 35.7 g/dL Final    RDW 01/22/2024 13.1  12.3 - 15.4 % Final    RDW-SD 01/22/2024 41.7  37.0 - 54.0 fl Final    MPV 01/22/2024 11.2  6.0 - 12.0 fL Final    Platelets 01/22/2024 174  140 - 450 10*3/mm3 Final    Glucose 01/22/2024 107 (H)  65 - 99 mg/dL Final    BUN 01/22/2024 15  6 - 20 mg/dL Final    Creatinine 01/22/2024 1.10  0.76 - 1.27 mg/dL Final    Sodium 01/22/2024 143  136 - 145 mmol/L Final    Potassium 01/22/2024 3.7  3.5 - 5.2 mmol/L Final    Chloride 01/22/2024 104  98 - 107 mmol/L Final    CO2 01/22/2024 27.0  22.0 - 29.0 mmol/L Final    Calcium 01/22/2024 9.4  8.6 - 10.5 mg/dL Final    Total Protein 01/22/2024 7.1  6.0 - 8.5 g/dL Final    Albumin 01/22/2024 4.5  3.5 - 5.2 g/dL Final    ALT (SGPT) 01/22/2024 25  1 - 41 U/L Final    AST (SGOT) 01/22/2024 20  1 - 40 U/L Final    Alkaline Phosphatase 01/22/2024 80  39 - 117 U/L Final    Total Bilirubin 01/22/2024 0.2  0.0 - 1.2 mg/dL Final    Globulin 01/22/2024 2.6  gm/dL Final    A/G Ratio 01/22/2024 1.7  g/dL Final    BUN/Creatinine Ratio 01/22/2024 13.6  7.0 - 25.0 Final    Anion Gap 01/22/2024 12.0  5.0 - 15.0 mmol/L Final    eGFR 01/22/2024 81.8  >60.0 mL/min/1.73 Final    Total Cholesterol 01/22/2024 133  0 - 200 mg/dL Final    Triglycerides 01/22/2024 63  0 - 150 mg/dL Final    HDL Cholesterol 01/22/2024 52  40 - 60 mg/dL Final    LDL Cholesterol  01/22/2024 68  0 - 100 mg/dL Final    VLDL Cholesterol 01/22/2024 13  5 - 40 mg/dL Final    LDL/HDL Ratio 01/22/2024 1.32    Final    TSH 01/22/2024 3.930  0.270 - 4.200 uIU/mL Final    PSA 01/22/2024 0.864  0.000 - 4.000 ng/mL Final    Vitamin B-12 01/22/2024 550  211 - 946 pg/mL Final    Color, UA 01/22/2024 Yellow  Yellow, Straw Final    Appearance, UA 01/22/2024 Clear  Clear Final    pH, UA 01/22/2024 7.5  5.0 - 8.0 Final    Specific Gravity, UA 01/22/2024 1.008  1.005 - 1.030 Final    Glucose, UA 01/22/2024 Negative  Negative Final    Ketones, UA 01/22/2024 Negative  Negative Final    Bilirubin, UA 01/22/2024 Negative  Negative Final    Blood, UA 01/22/2024 Negative  Negative Final    Protein, UA 01/22/2024 Negative  Negative Final    Leuk Esterase, UA 01/22/2024 Negative  Negative Final    Nitrite, UA 01/22/2024 Negative  Negative Final    Urobilinogen, UA 01/22/2024 0.2 E.U./dL  0.2 - 1.0 E.U./dL Final         Assessment & Plan   Problems Addressed this Visit          Mental Health    Attention deficit hyperactivity disorder (ADHD) - Primary    Relevant Medications    FLUoxetine (PROzac) 40 MG capsule    amphetamine-dextroamphetamine (Adderall) 12.5 MG tablet     Other Visit Diagnoses       Seasonal depression        Relevant Medications    FLUoxetine (PROzac) 40 MG capsule    amphetamine-dextroamphetamine (Adderall) 12.5 MG tablet    Bipolar affective disorder, mixed        Relevant Medications    FLUoxetine (PROzac) 40 MG capsule    lamoTRIgine ER (LaMICtal XR) 100 MG tablet sustained-release 24 hour    amphetamine-dextroamphetamine (Adderall) 12.5 MG tablet    Medication management        Relevant Medications    FLUoxetine (PROzac) 40 MG capsule    lamoTRIgine ER (LaMICtal XR) 100 MG tablet sustained-release 24 hour    amphetamine-dextroamphetamine (Adderall) 12.5 MG tablet          Diagnoses         Codes Comments    Attention deficit hyperactivity disorder (ADHD), combined type    -  Primary ICD-10-CM: F90.2  ICD-9-CM: 314.01     Seasonal depression     ICD-10-CM: F33.8  ICD-9-CM: 296.99     Bipolar affective disorder, mixed      ICD-10-CM: F31.60  ICD-9-CM: 296.60     Medication management     ICD-10-CM: Z79.899  ICD-9-CM: V58.69             Visit Diagnoses:    ICD-10-CM ICD-9-CM   1. Attention deficit hyperactivity disorder (ADHD), combined type  F90.2 314.01   2. Seasonal depression  F33.8 296.99   3. Bipolar affective disorder, mixed  F31.60 296.60   4. Medication management  Z79.899 V58.69       Prozac and lamictal refilled. Vyvanse discontinued and replaced with Adderall 12.5 mg daily in hopes to avoid side effects. [facility] reviewed and appropriate per report number [id number]. Patient is educated upon risks versus benefits as well as side effects and when to seek care in an emergency setting.  Patient verbalized understanding.  Follow up with this provider in 4 weeks or sooner if needed.    GOALS:  Short Term Goals: Patient will be compliant with medication, and patient will have no significant medication related side effects.  Patient will be engaged in psychotherapy as indicated.  Patient will report subjective improvement of symptoms.  Long term goals: To stabilize mood and treat/improve subjective symptoms, the patient will stay out of the hospital, the patient will be at an optimal level of functioning, and the patient will take all medications as prescribed.  The patient/guardian verbalized understanding and agreement with goals that were mutually set.      TREATMENT PLAN: Continue supportive psychotherapy efforts and medications as indicated.  Pharmacological and Non-Pharmacological treatment options discussed during today's visit. Patient/Guardian acknowledged and verbally consented with current treatment plan and was educated on the importance of compliance with treatment and follow-up appointments.      MEDICATION ISSUES:  Discussed medication options and treatment plan of prescribed medication as well as the risks, benefits, any black box warnings, and side effects including potential falls, possible impaired driving, and  metabolic adversities among others. Patient is agreeable to call the office with any worsening of symptoms or onset of side effects, or if any concerns or questions arise.  The contact information for the office is made available to the patient. Patient is agreeable to call 911 or go to the nearest ER should they begin having any SI/HI, or if any urgent concerns arise. No medication side effects or related complaints today.     MEDS ORDERED DURING VISIT:  New Medications Ordered This Visit   Medications    FLUoxetine (PROzac) 40 MG capsule     Sig: Take 1 capsule by mouth Daily.     Dispense:  90 capsule     Refill:  0    lamoTRIgine ER (LaMICtal XR) 100 MG tablet sustained-release 24 hour     Sig: Take 1 tablet by mouth Daily.     Dispense:  90 tablet     Refill:  0    amphetamine-dextroamphetamine (Adderall) 12.5 MG tablet     Sig: Take 1 tablet by mouth Daily.     Dispense:  30 tablet     Refill:  0       Follow Up Appointment:   Return in about 4 weeks (around 6/11/2024) for Recheck.             This document has been electronically signed by DEVEN Murillo  May 14, 2024 14:34 EDT    Some of the data in this electronic note has been brought forward from a previous encounter, any necessary changes have been made, it has been reviewed by this APRN, and it is accurate.    Dictated Utilizing Dragon Dictation: Part of this note may be an electronic transcription/translation of spoken language to printed text using the Dragon Dictation System.       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 Pathology confirmed malignancy. Pt s/p RT and chemotherapy, completed 10/2022. s/p partial vulvectomy 8/2023, states biopsy shows 'precancerous cells'  61 y/o female with history of carcinoma in situ of vulva presents to [facility] preop for partial vulvectomy., vulvar reconstruction with gluteal fold advancement flap, fasciocutaneous  flap; biopsy in June 2022 for vulvar mass. Pathology confirmed malignancy. Pt s/p RT and chemotherapy, completed 10/2022. s/p partial vulvectomy 8/2023, states biopsy shows 'precancerous cells' .

## 2024-05-17 ENCOUNTER — TELEPHONE (OUTPATIENT)
Dept: PSYCHIATRY | Facility: CLINIC | Age: 49
End: 2024-05-17
Payer: COMMERCIAL

## 2024-05-17 NOTE — TELEPHONE ENCOUNTER
Pharmacy called stating that adderall 12.5 is on  back order.  Pharmacy states they do currently have 7.5mg in stock and would like to know if you would like to switch patient's prescription to something with that dosage.  Please advise.

## 2024-05-20 DIAGNOSIS — F90.2 ATTENTION DEFICIT HYPERACTIVITY DISORDER (ADHD), COMBINED TYPE: ICD-10-CM

## 2024-05-20 DIAGNOSIS — Z79.899 MEDICATION MANAGEMENT: ICD-10-CM

## 2024-05-20 RX ORDER — DEXTROAMPHETAMINE SACCHARATE, AMPHETAMINE ASPARTATE, DEXTROAMPHETAMINE SULFATE AND AMPHETAMINE SULFATE 1.875; 1.875; 1.875; 1.875 MG/1; MG/1; MG/1; MG/1
TABLET ORAL
Qty: 60 TABLET | Refills: 0 | Status: SHIPPED | OUTPATIENT
Start: 2024-05-20

## 2024-05-24 DIAGNOSIS — Z79.899 MEDICATION MANAGEMENT: ICD-10-CM

## 2024-05-24 DIAGNOSIS — F90.2 ATTENTION DEFICIT HYPERACTIVITY DISORDER (ADHD), COMBINED TYPE: ICD-10-CM

## 2024-05-27 RX ORDER — DEXTROAMPHETAMINE SACCHARATE, AMPHETAMINE ASPARTATE, DEXTROAMPHETAMINE SULFATE AND AMPHETAMINE SULFATE 3.125; 3.125; 3.125; 3.125 MG/1; MG/1; MG/1; MG/1
12.5 TABLET ORAL DAILY
Qty: 30 TABLET | Refills: 0 | OUTPATIENT
Start: 2024-05-27

## 2024-06-11 ENCOUNTER — TELEMEDICINE (OUTPATIENT)
Dept: PSYCHIATRY | Facility: CLINIC | Age: 49
End: 2024-06-11
Payer: COMMERCIAL

## 2024-06-11 ENCOUNTER — TELEPHONE (OUTPATIENT)
Dept: PSYCHIATRY | Facility: CLINIC | Age: 49
End: 2024-06-11

## 2024-06-11 DIAGNOSIS — F33.8 SEASONAL DEPRESSION: ICD-10-CM

## 2024-06-11 DIAGNOSIS — F90.2 ATTENTION DEFICIT HYPERACTIVITY DISORDER (ADHD), COMBINED TYPE: Primary | ICD-10-CM

## 2024-06-11 DIAGNOSIS — Z79.899 MEDICATION MANAGEMENT: ICD-10-CM

## 2024-06-11 DIAGNOSIS — F31.60 BIPOLAR AFFECTIVE DISORDER, MIXED: ICD-10-CM

## 2024-06-11 PROCEDURE — 99214 OFFICE O/P EST MOD 30 MIN: CPT

## 2024-06-11 RX ORDER — DEXTROAMPHETAMINE SACCHARATE, AMPHETAMINE ASPARTATE, DEXTROAMPHETAMINE SULFATE AND AMPHETAMINE SULFATE 1.875; 1.875; 1.875; 1.875 MG/1; MG/1; MG/1; MG/1
TABLET ORAL
Qty: 60 TABLET | Refills: 0 | Status: SHIPPED | OUTPATIENT
Start: 2024-06-18

## 2024-06-11 NOTE — PROGRESS NOTES
"This provider is located at the [facility] (through [facility]), [address], [place], [address] using a secure video visit through [employer]. Patient is being seen remotely via telehealth at their home address in [place], and stated they are in a secure environment for this session.   The patient's condition being consulted/diagnosed/treated is appropriate for telemedicine. The provider identified herself as well as her credentials.   The patient, and/or patients guardian, consent to be seen remotely, and when consent is given they understand that the consent allows for patient identifiable information to be sent to a third party as needed. They may refuse to be seen remotely at any time. The electronic data is encrypted and password protected, and the patient and/or guardian has been advised of the potential risks to privacy not withstanding such measures.   The use of a video visit has been reviewed with the patient and verbal informed consent has been obtained.   Patient identifiers used: Name and .    Subjective   Ronaldo Cantu is a 48 y.o. male who presents today for follow up    Chief Complaint:    Chief Complaint   Patient presents with    Anxiety    Depression    ADHD    Med Management    Irritable    Sleeping Problem        History of Present Illness:   History of Present Illness  Patient is a 48-year-old male presenting for 1 month follow-up related to depression, anxiety, irritability and ADHD.  Voices compliance with medications, denies side effects.  Has tolerated initiation of Adderall well, states he is doing \"super good, it is working.\"  He is able to sit still and maintain concentration in conversation.  Regarding jaw clenching, this has lessened since switching to Adderall \"it still happens but it is not bad, I can recognize and correct it.\"  Denies cardiac symptoms, has appointment with cardiology scheduled in August.  Has tolerated increase to " "Prozac well.  Also continues with use of Lamictal.  Is no longer taking Vyvanse. JACQUELINE remains the same with score of 0 negative for anxiety.  PHQ remains the same with score of 0 negative for depression which patient also rates as 0/10.  Cites sleep as adequate \"7 to 8 hours.\"  Appetite is \"okay.\"  Denies SI, HI, SIB, or hallucinations currently and is convincing.  Denies mood swings, cites others have noticed improvement in symptoms, also his wife.  Denies medical status changes aside from recently suffering from some congestion.    Patient resides in a home with his wife of 8 years whom he cites as supportive.  He has 1 biological child and 2 stepchildren, all adults.  He has a college degree in biology and is now retired.  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  Now working on replanting grass in yard from work done.  Scientologist Restorationist preference.  Enjoys golfing.  Denies drug or alcohol use.       The following portions of the patient's history were reviewed and updated as appropriate: allergies, current medications, past family history, past medical history, past social history, past surgical history and problem list.    Past Psychiatric History:  Began Treatment: 7 years ago for anger management  Diagnoses: unsure  Psychiatrist:Denies  Therapist: previously  Admission History:Denies  Medication Trials: effexor straterra (more irritable) and quelbree( suicidal), clonidine (increased thirst), wellbutrin (made worse), vyvanse (side effects-jaw clenching)  Self Harm: Denies  Suicide Attempts:Denies   Psychosis, Anxiety, Depression:  N/A    Past Medical History:  Past Medical History:   Diagnosis Date    Nephrolithiasis        Substance Abuse History:   Types:Denies all, including illicit  Withdrawal Symptoms:Denies  Longest Period Sober:Not Applicable   AA: Not applicable     Social History:  Social History     Socioeconomic History    Marital status:     Number of children: 3   Tobacco Use    Smoking status: " Never    Smokeless tobacco: Never   Vaping Use    Vaping status: Never Used   Substance and Sexual Activity    Alcohol use: Yes     Alcohol/week: 1.0 standard drink of alcohol     Types: 1 Cans of beer per week     Comment: 1 per day    Drug use: Never    Sexual activity: Defer       Family History:  Family History   Problem Relation Age of Onset    Heart attack Mother     Suicide Attempts Sister     Cancer Maternal Grandmother     Lung cancer Paternal Grandfather        Past Surgical History:  Past Surgical History:   Procedure Laterality Date    APPENDECTOMY  09/2010    CARDIAC ABLATION  11/2019    CERVICAL FUSION  10/2012    KNEE ARTHROPLASTY, PARTIAL REPLACEMENT  12/2022    KNEE ARTHROSCOPY  05/2021    SHOULDER ARTHROSCOPY         Problem List:  Patient Active Problem List   Diagnosis    Chronic atrial fibrillation    Mixed hyperlipidemia    Primary osteoarthritis of both knees    Degeneration of lumbar or lumbosacral intervertebral disc    Toenail fungus    Bipolar affective disorder    Depression    Attention deficit hyperactivity disorder (ADHD)       Allergy:   Allergies   Allergen Reactions    Viloxazine Hcl Er Mental Status Change     Suicidal thoughts        Current Medications:   Current Outpatient Medications   Medication Sig Dispense Refill    [START ON 6/18/2024] amphetamine-dextroamphetamine (Adderall) 7.5 MG tablet Take 1 tab by mouth twice daily. 60 tablet 0    atorvastatin (LIPITOR) 20 MG tablet TAKE 1 TABLET BY MOUTH EVERY DAY IN THE EVENING 90 tablet 3    FLUoxetine (PROzac) 40 MG capsule Take 1 capsule by mouth Daily. 90 capsule 0    lamoTRIgine ER (LaMICtal XR) 100 MG tablet sustained-release 24 hour Take 1 tablet by mouth Daily. 90 tablet 0    tiZANidine (ZANAFLEX) 4 MG tablet        No current facility-administered medications for this visit.       Review of Systems:    Review of Systems   Constitutional: Negative.    HENT: Negative.     Eyes: Negative.    Respiratory: Negative.      Cardiovascular: Negative.    Gastrointestinal: Negative.    Endocrine: Negative.    Genitourinary: Negative.         Kidney stone   Musculoskeletal:  Positive for back pain.   Skin: Negative.    Allergic/Immunologic: Negative.    Neurological: Negative.  Negative for seizures.   Hematological: Negative.    Psychiatric/Behavioral:  Positive for positive for hyperactivity. The patient is nervous/anxious.          Physical Exam:   Physical Exam  Constitutional:       Appearance: Normal appearance. He is normal weight.   HENT:      Head: Normocephalic.      Nose: Nose normal.   Pulmonary:      Effort: Pulmonary effort is normal.   Musculoskeletal:         General: Normal range of motion.      Cervical back: Normal range of motion.   Neurological:      General: No focal deficit present.      Mental Status: He is alert and oriented to person, place, and time. Mental status is at baseline.   Psychiatric:         Attention and Perception: Attention and perception normal.         Mood and Affect: Affect normal. Mood is anxious.         Speech: Speech normal.         Behavior: Behavior normal. Behavior is cooperative.         Thought Content: Thought content normal.         Cognition and Memory: Cognition and memory normal.         Judgment: Judgment normal.     Vitals:  There were no vitals taken for this visit. There is no height or weight on file to calculate BMI.  Due to extenuating circumstances and possible current health risks associated with the patient being present in a clinical setting (with current health restrictions in place in regards to possible COVID 19 transmission/exposure), the patient was seen remotely today via a MyChart Video Visit through [employer].  Unable to obtain vital signs due to nature of remote visit.  Height stated at 6ft1 inches.  Weight stated at 183 pounds.    Last 3 Blood Pressure Readings:  BP Readings from Last 3 Encounters:   04/12/24 108/70   01/20/24 120/68   06/19/23 122/68     PHQ-9  Depression Screening  Little interest or pleasure in doing things? (P) 0-->not at all   Feeling down, depressed, or hopeless? (P) 0-->not at all   Trouble falling or staying asleep, or sleeping too much? (P) 0-->not at all   Feeling tired or having little energy? (P) 0-->not at all   Poor appetite or overeating? (P) 0-->not at all   Feeling bad about yourself - or that you are a failure or have let yourself or your family down? (P) 0-->not at all   Trouble concentrating on things, such as reading the newspaper or watching television? (P) 0-->not at all   Moving or speaking so slowly that other people could have noticed? Or the opposite - being so fidgety or restless that you have been moving around a lot more than usual? (P) 0-->not at all   Thoughts that you would be better off dead, or of hurting yourself in some way? (P) 0-->not at all   PHQ-9 Total Score (P) 0   If you checked off any problems, how difficult have these problems made it for you to do your work, take care of things at home, or get along with other people? (P) not difficult at all     JACQUELINE: 0    Mental Status Exam:   Hygiene:   good  Cooperation:  Cooperative  Eye Contact:  Good  Psychomotor Behavior:  Appropriate  Affect:  Appropriate  Mood: normal  Hopelessness: Denies  Speech:  Normal  Thought Process:  Goal directed  Thought Content:  Normal  Suicidal:  None  Homicidal:  None  Hallucinations:  None  Delusion:  None  Memory:  Intact  Orientation:  Grossly intact  Reliability:  good  Insight:  Good  Judgement:  Fair  Impulse Control:  Fair  Physical/Medical Issues:   back surgeries, knee surgeries, cardiac ablation 2019        Lab Results:   Hospital Outpatient Visit on 04/12/2024   Component Date Value Ref Range Status    BH CV VAS BP RIGHT ARM 04/12/2024 114/87  mmHg Final    EF(MOD-bp) 04/12/2024 57.0  % Final    LVIDd 04/12/2024 4.9  cm Final    LVIDs 04/12/2024 3.2  cm Final    IVSd 04/12/2024 0.80  cm Final    LVPWd 04/12/2024 0.80  cm Final     FS 04/12/2024 34.7  % Final    IVS/LVPW 04/12/2024 1.00  cm Final    ESV(cubed) 04/12/2024 32.8  ml Final    LV Sys Vol (BSA corrected) 04/12/2024 22.3  cm2 Final    EDV(cubed) 04/12/2024 117.6  ml Final    LV Garcia Vol (BSA corrected) 04/12/2024 51.8  cm2 Final    LV mass(C)d 04/12/2024 131.2  grams Final    LVOT area 04/12/2024 3.1  cm2 Final    LVOT diam 04/12/2024 2.00  cm Final    EDV(MOD-sp2) 04/12/2024 96.6  ml Final    EDV(MOD-sp4) 04/12/2024 108.0  ml Final    ESV(MOD-sp2) 04/12/2024 41.0  ml Final    ESV(MOD-sp4) 04/12/2024 46.5  ml Final    SV(MOD-sp2) 04/12/2024 55.6  ml Final    SV(MOD-sp4) 04/12/2024 61.5  ml Final    SVi(MOD-SP2) 04/12/2024 26.6  ml/m2 Final    SVi(MOD-SP4) 04/12/2024 29.5  ml/m2 Final    EF(MOD-sp2) 04/12/2024 57.6  % Final    EF(MOD-sp4) 04/12/2024 56.9  % Final    MV E max gilmar 04/12/2024 46.3  cm/sec Final    MV A max gilmar 04/12/2024 40.8  cm/sec Final    MV dec time 04/12/2024 0.19  sec Final    MV E/A 04/12/2024 1.13   Final    LA ESV Index (BP) 04/12/2024 22.8  ml/m2 Final    Med Peak E' Gilmar 04/12/2024 10.8  cm/sec Final    Lat Peak E' Gilmar 04/12/2024 13.1  cm/sec Final    Avg E/e' ratio 04/12/2024 3.87   Final    SV(LVOT) 04/12/2024 49.6  ml Final    RV Base 04/12/2024 2.8  cm Final    RV Mid 04/12/2024 2.20  cm Final    RV Length 04/12/2024 7.7  cm Final    TAPSE (>1.6) 04/12/2024 2.9  cm Final    RV S' 04/12/2024 11.5  cm/sec Final    LA dimension (2D)  04/12/2024 3.3  cm Final    LV V1 max 04/12/2024 88.7  cm/sec Final    LV V1 max PG 04/12/2024 3.1  mmHg Final    LV V1 mean PG 04/12/2024 2.00  mmHg Final    LV V1 VTI 04/12/2024 15.8  cm Final    Ao pk gilmar 04/12/2024 125.0  cm/sec Final    Ao max PG 04/12/2024 6.3  mmHg Final    Ao mean PG 04/12/2024 3.0  mmHg Final    Ao V2 VTI 04/12/2024 22.6  cm Final    CANDIE(I,D) 04/12/2024 2.20  cm2 Final    MV max PG 04/12/2024 1.29  mmHg Final    MV mean PG 04/12/2024 1.00  mmHg Final    MV V2 VTI 04/12/2024 16.8  cm Final    MV P1/2t  04/12/2024 67.5  msec Final    MVA(P1/2t) 04/12/2024 3.3  cm2 Final    MVA(VTI) 04/12/2024 3.0  cm2 Final    MV dec slope 04/12/2024 268.0  cm/sec2 Final    PA acc time 04/12/2024 0.14  sec Final    Ao root diam 04/12/2024 3.7  cm Final   Orders Only on 01/22/2024   Component Date Value Ref Range Status    Hemoglobin A1C 01/22/2024 5.70 (H)  4.80 - 5.60 % Final   Office Visit on 01/20/2024   Component Date Value Ref Range Status    WBC 01/22/2024 6.01  3.40 - 10.80 10*3/mm3 Final    RBC 01/22/2024 4.91  4.14 - 5.80 10*6/mm3 Final    Hemoglobin 01/22/2024 14.6  13.0 - 17.7 g/dL Final    Hematocrit 01/22/2024 43.0  37.5 - 51.0 % Final    MCV 01/22/2024 87.6  79.0 - 97.0 fL Final    MCH 01/22/2024 29.7  26.6 - 33.0 pg Final    MCHC 01/22/2024 34.0  31.5 - 35.7 g/dL Final    RDW 01/22/2024 13.1  12.3 - 15.4 % Final    RDW-SD 01/22/2024 41.7  37.0 - 54.0 fl Final    MPV 01/22/2024 11.2  6.0 - 12.0 fL Final    Platelets 01/22/2024 174  140 - 450 10*3/mm3 Final    Glucose 01/22/2024 107 (H)  65 - 99 mg/dL Final    BUN 01/22/2024 15  6 - 20 mg/dL Final    Creatinine 01/22/2024 1.10  0.76 - 1.27 mg/dL Final    Sodium 01/22/2024 143  136 - 145 mmol/L Final    Potassium 01/22/2024 3.7  3.5 - 5.2 mmol/L Final    Chloride 01/22/2024 104  98 - 107 mmol/L Final    CO2 01/22/2024 27.0  22.0 - 29.0 mmol/L Final    Calcium 01/22/2024 9.4  8.6 - 10.5 mg/dL Final    Total Protein 01/22/2024 7.1  6.0 - 8.5 g/dL Final    Albumin 01/22/2024 4.5  3.5 - 5.2 g/dL Final    ALT (SGPT) 01/22/2024 25  1 - 41 U/L Final    AST (SGOT) 01/22/2024 20  1 - 40 U/L Final    Alkaline Phosphatase 01/22/2024 80  39 - 117 U/L Final    Total Bilirubin 01/22/2024 0.2  0.0 - 1.2 mg/dL Final    Globulin 01/22/2024 2.6  gm/dL Final    A/G Ratio 01/22/2024 1.7  g/dL Final    BUN/Creatinine Ratio 01/22/2024 13.6  7.0 - 25.0 Final    Anion Gap 01/22/2024 12.0  5.0 - 15.0 mmol/L Final    eGFR 01/22/2024 81.8  >60.0 mL/min/1.73 Final    Total Cholesterol 01/22/2024  133  0 - 200 mg/dL Final    Triglycerides 01/22/2024 63  0 - 150 mg/dL Final    HDL Cholesterol 01/22/2024 52  40 - 60 mg/dL Final    LDL Cholesterol  01/22/2024 68  0 - 100 mg/dL Final    VLDL Cholesterol 01/22/2024 13  5 - 40 mg/dL Final    LDL/HDL Ratio 01/22/2024 1.32   Final    TSH 01/22/2024 3.930  0.270 - 4.200 uIU/mL Final    PSA 01/22/2024 0.864  0.000 - 4.000 ng/mL Final    Vitamin B-12 01/22/2024 550  211 - 946 pg/mL Final    Color, UA 01/22/2024 Yellow  Yellow, Straw Final    Appearance, UA 01/22/2024 Clear  Clear Final    pH, UA 01/22/2024 7.5  5.0 - 8.0 Final    Specific Gravity, UA 01/22/2024 1.008  1.005 - 1.030 Final    Glucose, UA 01/22/2024 Negative  Negative Final    Ketones, UA 01/22/2024 Negative  Negative Final    Bilirubin, UA 01/22/2024 Negative  Negative Final    Blood, UA 01/22/2024 Negative  Negative Final    Protein, UA 01/22/2024 Negative  Negative Final    Leuk Esterase, UA 01/22/2024 Negative  Negative Final    Nitrite, UA 01/22/2024 Negative  Negative Final    Urobilinogen, UA 01/22/2024 0.2 E.U./dL  0.2 - 1.0 E.U./dL Final         Assessment & Plan   Problems Addressed this Visit          Mental Health    Attention deficit hyperactivity disorder (ADHD) - Primary    Relevant Medications    amphetamine-dextroamphetamine (Adderall) 7.5 MG tablet (Start on 6/18/2024)     Other Visit Diagnoses       Seasonal depression        Relevant Medications    amphetamine-dextroamphetamine (Adderall) 7.5 MG tablet (Start on 6/18/2024)    Bipolar affective disorder, mixed        Relevant Medications    amphetamine-dextroamphetamine (Adderall) 7.5 MG tablet (Start on 6/18/2024)    Medication management        Relevant Medications    amphetamine-dextroamphetamine (Adderall) 7.5 MG tablet (Start on 6/18/2024)          Diagnoses         Codes Comments    Attention deficit hyperactivity disorder (ADHD), combined type    -  Primary ICD-10-CM: F90.2  ICD-9-CM: 314.01     Seasonal depression     ICD-10-CM:  F33.8  ICD-9-CM: 296.99     Bipolar affective disorder, mixed     ICD-10-CM: F31.60  ICD-9-CM: 296.60     Medication management     ICD-10-CM: Z79.899  ICD-9-CM: V58.69             Visit Diagnoses:    ICD-10-CM ICD-9-CM   1. Attention deficit hyperactivity disorder (ADHD), combined type  F90.2 314.01   2. Seasonal depression  F33.8 296.99   3. Bipolar affective disorder, mixed  F31.60 296.60   4. Medication management  Z79.899 V58.69       Prozac and Lamictal continue as prescribed, patient does not need refills currently.  Adderall future order placed.  Juan David reviewed and appropriate at this time per her report #[id number].  Staff called pharmacy to ensure no Vyvanse prescription was available for refill.  Discussed taking medications as prescribed. Previously educated upon side effects with use of these medications as well as when to present for emergency services, denies at this time.  Follow up in 6 weeks or sooner if needed.    GOALS:  Short Term Goals: Patient will be compliant with medication, and patient will have no significant medication related side effects.  Patient will be engaged in psychotherapy as indicated.  Patient will report subjective improvement of symptoms.  Long term goals: To stabilize mood and treat/improve subjective symptoms, the patient will stay out of the hospital, the patient will be at an optimal level of functioning, and the patient will take all medications as prescribed.  The patient/guardian verbalized understanding and agreement with goals that were mutually set.      TREATMENT PLAN: Continue supportive psychotherapy efforts and medications as indicated.  Pharmacological and Non-Pharmacological treatment options discussed during today's visit. Patient/Guardian acknowledged and verbally consented with current treatment plan and was educated on the importance of compliance with treatment and follow-up appointments.      MEDICATION ISSUES:  Discussed medication options and  treatment plan of prescribed medication as well as the risks, benefits, any black box warnings, and side effects including potential falls, possible impaired driving, and metabolic adversities among others. Patient is agreeable to call the office with any worsening of symptoms or onset of side effects, or if any concerns or questions arise.  The contact information for the office is made available to the patient. Patient is agreeable to call 911 or go to the nearest ER should they begin having any SI/HI, or if any urgent concerns arise. No medication side effects or related complaints today.     MEDS ORDERED DURING VISIT:  New Medications Ordered This Visit   Medications    amphetamine-dextroamphetamine (Adderall) 7.5 MG tablet     Sig: Take 1 tab by mouth twice daily.     Dispense:  60 tablet     Refill:  0     Refill when appropriate-no early refills       Follow Up Appointment:   Return in about 6 weeks (around 7/23/2024) for Recheck.             This document has been electronically signed by DEVEN Murillo  June 11, 2024 14:32 EDT    Some of the data in this electronic note has been brought forward from a previous encounter, any necessary changes have been made, it has been reviewed by this APRN, and it is accurate.    Dictated Utilizing Dragon Dictation: Part of this note may be an electronic transcription/translation of spoken language to printed text using the Dragon Dictation System.

## 2024-06-11 NOTE — TELEPHONE ENCOUNTER
Contacted pharmacy and patient picked up prescription written for 04/2024 on 05/03/2024.  Pharmacy states they do not have any current prescriptions for Vyvanse for this patient.

## 2024-06-11 NOTE — TELEPHONE ENCOUNTER
----- Message from Ninfa Leiva sent at 6/11/2024  2:08 PM EDT -----  Regarding: Cancel Jessica Menard-sorry to bother. I would ask yumiko to cancel this but I think she's busy. I'm on with this patient now, jessica gonzalez is telling me needs to be cancelled from this pharmacy [facility] - [place], [place] - [address] [phone] [facility] [phone] [facility] - [phone]. Can you call and see if this has already been cancelled? I sent it awhile back

## 2024-07-18 ENCOUNTER — TELEMEDICINE (OUTPATIENT)
Dept: PSYCHIATRY | Facility: CLINIC | Age: 49
End: 2024-07-18
Payer: COMMERCIAL

## 2024-07-18 DIAGNOSIS — F33.8 SEASONAL DEPRESSION: ICD-10-CM

## 2024-07-18 DIAGNOSIS — F90.2 ATTENTION DEFICIT HYPERACTIVITY DISORDER (ADHD), COMBINED TYPE: Primary | ICD-10-CM

## 2024-07-18 DIAGNOSIS — Z79.899 MEDICATION MANAGEMENT: ICD-10-CM

## 2024-07-18 DIAGNOSIS — F31.60 BIPOLAR AFFECTIVE DISORDER, MIXED: ICD-10-CM

## 2024-07-18 RX ORDER — DEXTROAMPHETAMINE SACCHARATE, AMPHETAMINE ASPARTATE MONOHYDRATE, DEXTROAMPHETAMINE SULFATE AND AMPHETAMINE SULFATE 3.75; 3.75; 3.75; 3.75 MG/1; MG/1; MG/1; MG/1
15 CAPSULE, EXTENDED RELEASE ORAL DAILY
Qty: 30 CAPSULE | Refills: 0 | Status: SHIPPED | OUTPATIENT
Start: 2024-07-18

## 2024-07-18 NOTE — PROGRESS NOTES
"This provider is located at the [facility] (through [facility]), [address], [place], [address] using a secure video visit through [employer]. Patient is being seen remotely via telehealth at their home address in [place], and stated they are in a secure environment for this session.   The patient's condition being consulted/diagnosed/treated is appropriate for telemedicine. The provider identified herself as well as her credentials.   The patient, and/or patients guardian, consent to be seen remotely, and when consent is given they understand that the consent allows for patient identifiable information to be sent to a third party as needed. They may refuse to be seen remotely at any time. The electronic data is encrypted and password protected, and the patient and/or guardian has been advised of the potential risks to privacy not withstanding such measures.   The use of a video visit has been reviewed with the patient and verbal informed consent has been obtained.   Patient identifiers used: Name and .    Subjective   Ronaldo Cantu is a 48 y.o. male who presents today for follow up    Chief Complaint:    Chief Complaint   Patient presents with    Anxiety    Depression    Med Management    Irritable        History of Present Illness:   History of Present Illness  Patient is a 48-year-old male presenting for 1 month follow-up related to depression, anxiety, irritability and ADHD.  Voices compliance, when asked about side effects he feels Adderall dosage is wearing off too early.  He is unsure if jaw clenching has lessened \"I am not sure, maybe it has.\"  Also remains on Prozac and Lamictal.  Denies mood lability, some \"situational irritability but that has subsided.\"  PHQ increased from 0-2, minimal for depression which patient rates as 0/10.  He rates anxiety the same \"it has been fine.\"  JACQUELINE increased from 0-2, indicating minimal anxiety.  Anxiety also situational " "due to working on home projects \"I was really overwhelmed but I am starting to feel a little better.\"  Denies SI, HI, SIB, or hallucinations currently and is convincing.  Believes Adderall has been beneficial in addressing concentration deficits \"it keeps me focused, it helps.\"  But again acknowledges feeling he is driving in the afternoons.  He states overall \"I am feeling better but life has been challenging.\"  Denies medical status changes.  Has appointment with cardiology scheduled next month.    Patient resides in a home with his wife of 8 years whom he cites as supportive.  He has 1 biological child and 2 stepchildren, all adults.  He has a college degree in biology and is now retired.  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  Now working on replanting grass in yard from work done.  Orthodoxy [employer] preference.  Enjoys golfing.  Denies drug or alcohol use.  Attending music festival this week and he is looking forward to it.      The following portions of the patient's history were reviewed and updated as appropriate: allergies, current medications, past family history, past medical history, past social history, past surgical history and problem list.    Past Psychiatric History:  Began Treatment: 7 years ago for anger management  Diagnoses: unsure  Psychiatrist:Denies  Therapist: previously  Admission History:Denies  Medication Trials: effexor straterra (more irritable) and quelbree( suicidal), clonidine (increased thirst), wellbutrin (made worse), vyvanse (side effects-jaw clenching), adderall 7.5 BID (wore off)  Self Harm: Denies  Suicide Attempts:Denies   Psychosis, Anxiety, Depression:  N/A    Past Medical History:  Past Medical History:   Diagnosis Date    Nephrolithiasis        Substance Abuse History:   Types:Denies all, including illicit  Withdrawal Symptoms:Denies  Longest Period Sober:Not Applicable   AA: Not applicable     Social History:  Social History     Socioeconomic History    Marital status: "     Number of children: 3   Tobacco Use    Smoking status: Never    Smokeless tobacco: Never   Vaping Use    Vaping status: Never Used   Substance and Sexual Activity    Alcohol use: Yes     Alcohol/week: 1.0 standard drink of alcohol     Types: 1 Cans of beer per week     Comment: 1 per day    Drug use: Never    Sexual activity: Defer       Family History:  Family History   Problem Relation Age of Onset    Heart attack Mother     Suicide Attempts Sister     Cancer Maternal Grandmother     Lung cancer Paternal Grandfather        Past Surgical History:  Past Surgical History:   Procedure Laterality Date    APPENDECTOMY  09/2010    CARDIAC ABLATION  11/2019    CERVICAL FUSION  10/2012    KNEE ARTHROPLASTY, PARTIAL REPLACEMENT  12/2022    KNEE ARTHROSCOPY  05/2021    SHOULDER ARTHROSCOPY         Problem List:  Patient Active Problem List   Diagnosis    Chronic atrial fibrillation    Mixed hyperlipidemia    Primary osteoarthritis of both knees    Degeneration of lumbar or lumbosacral intervertebral disc    Toenail fungus    Bipolar affective disorder    Depression    Attention deficit hyperactivity disorder (ADHD)       Allergy:   Allergies   Allergen Reactions    Viloxazine Hcl Er Mental Status Change     Suicidal thoughts        Current Medications:   Current Outpatient Medications   Medication Sig Dispense Refill    amphetamine-dextroamphetamine XR (Adderall XR) 15 MG 24 hr capsule Take 1 capsule by mouth Daily 30 capsule 0    atorvastatin (LIPITOR) 20 MG tablet TAKE 1 TABLET BY MOUTH EVERY DAY IN THE EVENING 90 tablet 3    FLUoxetine (PROzac) 40 MG capsule Take 1 capsule by mouth Daily. 90 capsule 0    lamoTRIgine ER (LaMICtal XR) 100 MG tablet sustained-release 24 hour Take 1 tablet by mouth Daily. 90 tablet 0    tiZANidine (ZANAFLEX) 4 MG tablet        No current facility-administered medications for this visit.       Review of Systems:    Review of Systems   Constitutional: Negative.    HENT: Negative.      Eyes: Negative.    Respiratory: Negative.     Cardiovascular: Negative.    Gastrointestinal: Negative.    Endocrine: Negative.    Genitourinary: Negative.         Kidney stone   Musculoskeletal:  Positive for back pain.   Skin: Negative.    Allergic/Immunologic: Negative.    Neurological: Negative.  Negative for seizures.   Hematological: Negative.    Psychiatric/Behavioral:  Positive for positive for hyperactivity. The patient is nervous/anxious.          Physical Exam:   Physical Exam  Constitutional:       Appearance: Normal appearance. He is normal weight.   HENT:      Head: Normocephalic.      Nose: Nose normal.   Pulmonary:      Effort: Pulmonary effort is normal.   Musculoskeletal:         General: Normal range of motion.      Cervical back: Normal range of motion.   Neurological:      General: No focal deficit present.      Mental Status: He is alert and oriented to person, place, and time. Mental status is at baseline.   Psychiatric:         Attention and Perception: Attention and perception normal.         Mood and Affect: Affect normal. Mood is anxious.         Speech: Speech normal.         Behavior: Behavior normal. Behavior is cooperative.         Thought Content: Thought content normal.         Cognition and Memory: Cognition and memory normal.         Judgment: Judgment normal.     Vitals:  There were no vitals taken for this visit. There is no height or weight on file to calculate BMI.  Due to extenuating circumstances and possible current health risks associated with the patient being present in a clinical setting (with current health restrictions in place in regards to possible COVID 19 transmission/exposure), the patient was seen remotely today via a MyChart Video Visit through [employer].  Unable to obtain vital signs due to nature of remote visit.  Height stated at 6ft1 inches.  Weight stated at 183 pounds.    Last 3 Blood Pressure Readings:  BP Readings from Last 3 Encounters:   04/12/24 108/70    01/20/24 120/68   06/19/23 122/68     PHQ-9 Depression Screening  Little interest or pleasure in doing things? (P) 0-->not at all   Feeling down, depressed, or hopeless? (P) 1-->several days   Trouble falling or staying asleep, or sleeping too much? (P) 0-->not at all   Feeling tired or having little energy? (P) 1-->several days   Poor appetite or overeating? (P) 0-->not at all   Feeling bad about yourself - or that you are a failure or have let yourself or your family down? (P) 0-->not at all   Trouble concentrating on things, such as reading the newspaper or watching television? (P) 0-->not at all   Moving or speaking so slowly that other people could have noticed? Or the opposite - being so fidgety or restless that you have been moving around a lot more than usual? (P) 0-->not at all   Thoughts that you would be better off dead, or of hurting yourself in some way? (P) 0-->not at all   PHQ-9 Total Score (P) 2   If you checked off any problems, how difficult have these problems made it for you to do your work, take care of things at home, or get along with other people? (P) not difficult at all     JACQUELINE: 0    Mental Status Exam:   Hygiene:   good  Cooperation:  Cooperative  Eye Contact:  Good  Psychomotor Behavior:  Appropriate  Affect:  Appropriate  Mood: normal and anxious  Hopelessness: Denies  Speech:  Rapid  Thought Process:  Goal directed  Thought Content:  Normal  Suicidal:  None  Homicidal:  None  Hallucinations:  None  Delusion:  None  Memory:  Intact  Orientation:  Grossly intact  Reliability:  good  Insight:  Good  Judgement:  Fair  Impulse Control:  Fair  Physical/Medical Issues:   back surgeries, knee surgeries, cardiac ablation 2019        Lab Results:   Hospital Outpatient Visit on 04/12/2024   Component Date Value Ref Range Status    BH CV VAS BP RIGHT ARM 04/12/2024 114/87  mmHg Final    EF(MOD-bp) 04/12/2024 57.0  % Final    LVIDd 04/12/2024 4.9  cm Final    LVIDs 04/12/2024 3.2  cm Final    IVSd  04/12/2024 0.80  cm Final    LVPWd 04/12/2024 0.80  cm Final    FS 04/12/2024 34.7  % Final    IVS/LVPW 04/12/2024 1.00  cm Final    ESV(cubed) 04/12/2024 32.8  ml Final    LV Sys Vol (BSA corrected) 04/12/2024 22.3  cm2 Final    EDV(cubed) 04/12/2024 117.6  ml Final    LV Garcia Vol (BSA corrected) 04/12/2024 51.8  cm2 Final    LV mass(C)d 04/12/2024 131.2  grams Final    LVOT area 04/12/2024 3.1  cm2 Final    LVOT diam 04/12/2024 2.00  cm Final    EDV(MOD-sp2) 04/12/2024 96.6  ml Final    EDV(MOD-sp4) 04/12/2024 108.0  ml Final    ESV(MOD-sp2) 04/12/2024 41.0  ml Final    ESV(MOD-sp4) 04/12/2024 46.5  ml Final    SV(MOD-sp2) 04/12/2024 55.6  ml Final    SV(MOD-sp4) 04/12/2024 61.5  ml Final    SVi(MOD-SP2) 04/12/2024 26.6  ml/m2 Final    SVi(MOD-SP4) 04/12/2024 29.5  ml/m2 Final    EF(MOD-sp2) 04/12/2024 57.6  % Final    EF(MOD-sp4) 04/12/2024 56.9  % Final    MV E max gilmar 04/12/2024 46.3  cm/sec Final    MV A max gilmar 04/12/2024 40.8  cm/sec Final    MV dec time 04/12/2024 0.19  sec Final    MV E/A 04/12/2024 1.13   Final    LA ESV Index (BP) 04/12/2024 22.8  ml/m2 Final    Med Peak E' Gilmar 04/12/2024 10.8  cm/sec Final    Lat Peak E' Gilmar 04/12/2024 13.1  cm/sec Final    Avg E/e' ratio 04/12/2024 3.87   Final    SV(LVOT) 04/12/2024 49.6  ml Final    RV Base 04/12/2024 2.8  cm Final    RV Mid 04/12/2024 2.20  cm Final    RV Length 04/12/2024 7.7  cm Final    TAPSE (>1.6) 04/12/2024 2.9  cm Final    RV S' 04/12/2024 11.5  cm/sec Final    LA dimension (2D)  04/12/2024 3.3  cm Final    LV V1 max 04/12/2024 88.7  cm/sec Final    LV V1 max PG 04/12/2024 3.1  mmHg Final    LV V1 mean PG 04/12/2024 2.00  mmHg Final    LV V1 VTI 04/12/2024 15.8  cm Final    Ao pk gilmar 04/12/2024 125.0  cm/sec Final    Ao max PG 04/12/2024 6.3  mmHg Final    Ao mean PG 04/12/2024 3.0  mmHg Final    Ao V2 VTI 04/12/2024 22.6  cm Final    CANDIE(I,D) 04/12/2024 2.20  cm2 Final    MV max PG 04/12/2024 1.29  mmHg Final    MV mean PG 04/12/2024 1.00   mmHg Final    MV V2 VTI 04/12/2024 16.8  cm Final    MV P1/2t 04/12/2024 67.5  msec Final    MVA(P1/2t) 04/12/2024 3.3  cm2 Final    MVA(VTI) 04/12/2024 3.0  cm2 Final    MV dec slope 04/12/2024 268.0  cm/sec2 Final    PA acc time 04/12/2024 0.14  sec Final    Ao root diam 04/12/2024 3.7  cm Final   Orders Only on 01/22/2024   Component Date Value Ref Range Status    Hemoglobin A1C 01/22/2024 5.70 (H)  4.80 - 5.60 % Final   Office Visit on 01/20/2024   Component Date Value Ref Range Status    WBC 01/22/2024 6.01  3.40 - 10.80 10*3/mm3 Final    RBC 01/22/2024 4.91  4.14 - 5.80 10*6/mm3 Final    Hemoglobin 01/22/2024 14.6  13.0 - 17.7 g/dL Final    Hematocrit 01/22/2024 43.0  37.5 - 51.0 % Final    MCV 01/22/2024 87.6  79.0 - 97.0 fL Final    MCH 01/22/2024 29.7  26.6 - 33.0 pg Final    MCHC 01/22/2024 34.0  31.5 - 35.7 g/dL Final    RDW 01/22/2024 13.1  12.3 - 15.4 % Final    RDW-SD 01/22/2024 41.7  37.0 - 54.0 fl Final    MPV 01/22/2024 11.2  6.0 - 12.0 fL Final    Platelets 01/22/2024 174  140 - 450 10*3/mm3 Final    Glucose 01/22/2024 107 (H)  65 - 99 mg/dL Final    BUN 01/22/2024 15  6 - 20 mg/dL Final    Creatinine 01/22/2024 1.10  0.76 - 1.27 mg/dL Final    Sodium 01/22/2024 143  136 - 145 mmol/L Final    Potassium 01/22/2024 3.7  3.5 - 5.2 mmol/L Final    Chloride 01/22/2024 104  98 - 107 mmol/L Final    CO2 01/22/2024 27.0  22.0 - 29.0 mmol/L Final    Calcium 01/22/2024 9.4  8.6 - 10.5 mg/dL Final    Total Protein 01/22/2024 7.1  6.0 - 8.5 g/dL Final    Albumin 01/22/2024 4.5  3.5 - 5.2 g/dL Final    ALT (SGPT) 01/22/2024 25  1 - 41 U/L Final    AST (SGOT) 01/22/2024 20  1 - 40 U/L Final    Alkaline Phosphatase 01/22/2024 80  39 - 117 U/L Final    Total Bilirubin 01/22/2024 0.2  0.0 - 1.2 mg/dL Final    Globulin 01/22/2024 2.6  gm/dL Final    A/G Ratio 01/22/2024 1.7  g/dL Final    BUN/Creatinine Ratio 01/22/2024 13.6  7.0 - 25.0 Final    Anion Gap 01/22/2024 12.0  5.0 - 15.0 mmol/L Final    eGFR 01/22/2024  81.8  >60.0 mL/min/1.73 Final    Total Cholesterol 01/22/2024 133  0 - 200 mg/dL Final    Triglycerides 01/22/2024 63  0 - 150 mg/dL Final    HDL Cholesterol 01/22/2024 52  40 - 60 mg/dL Final    LDL Cholesterol  01/22/2024 68  0 - 100 mg/dL Final    VLDL Cholesterol 01/22/2024 13  5 - 40 mg/dL Final    LDL/HDL Ratio 01/22/2024 1.32   Final    TSH 01/22/2024 3.930  0.270 - 4.200 uIU/mL Final    PSA 01/22/2024 0.864  0.000 - 4.000 ng/mL Final    Vitamin B-12 01/22/2024 550  211 - 946 pg/mL Final    Color, UA 01/22/2024 Yellow  Yellow, Straw Final    Appearance, UA 01/22/2024 Clear  Clear Final    pH, UA 01/22/2024 7.5  5.0 - 8.0 Final    Specific Gravity, UA 01/22/2024 1.008  1.005 - 1.030 Final    Glucose, UA 01/22/2024 Negative  Negative Final    Ketones, UA 01/22/2024 Negative  Negative Final    Bilirubin, UA 01/22/2024 Negative  Negative Final    Blood, UA 01/22/2024 Negative  Negative Final    Protein, UA 01/22/2024 Negative  Negative Final    Leuk Esterase, UA 01/22/2024 Negative  Negative Final    Nitrite, UA 01/22/2024 Negative  Negative Final    Urobilinogen, UA 01/22/2024 0.2 E.U./dL  0.2 - 1.0 E.U./dL Final         Assessment & Plan   Problems Addressed this Visit          Mental Health    Attention deficit hyperactivity disorder (ADHD) - Primary    Relevant Medications    amphetamine-dextroamphetamine XR (Adderall XR) 15 MG 24 hr capsule     Other Visit Diagnoses       Seasonal depression        Relevant Medications    amphetamine-dextroamphetamine XR (Adderall XR) 15 MG 24 hr capsule    Bipolar affective disorder, mixed        Relevant Medications    amphetamine-dextroamphetamine XR (Adderall XR) 15 MG 24 hr capsule    Medication management        Relevant Medications    amphetamine-dextroamphetamine XR (Adderall XR) 15 MG 24 hr capsule          Diagnoses         Codes Comments    Attention deficit hyperactivity disorder (ADHD), combined type    -  Primary ICD-10-CM: F90.2  ICD-9-CM: 314.01     Seasonal  depression     ICD-10-CM: F33.8  ICD-9-CM: 296.99     Bipolar affective disorder, mixed     ICD-10-CM: F31.60  ICD-9-CM: 296.60     Medication management     ICD-10-CM: Z79.899  ICD-9-CM: V58.69             Visit Diagnoses:    ICD-10-CM ICD-9-CM   1. Attention deficit hyperactivity disorder (ADHD), combined type  F90.2 314.01   2. Seasonal depression  F33.8 296.99   3. Bipolar affective disorder, mixed  F31.60 296.60   4. Medication management  Z79.899 V58.69       Continue Prozac and Lamictal as prescribed, does not need refills at this point.  Juan David reviewed and appropriate per her report #[id number].  We will change Adderall to extended release, same dosage.Patient is educated upon risks versus benefits as well as side effects and when to seek care in an emergency setting.  Patient verbalized understanding.  Follow up with this provider in 4 weeks or sooner if needed.    GOALS:  Short Term Goals: Patient will be compliant with medication, and patient will have no significant medication related side effects.  Patient will be engaged in psychotherapy as indicated.  Patient will report subjective improvement of symptoms.  Long term goals: To stabilize mood and treat/improve subjective symptoms, the patient will stay out of the hospital, the patient will be at an optimal level of functioning, and the patient will take all medications as prescribed.  The patient/guardian verbalized understanding and agreement with goals that were mutually set.      TREATMENT PLAN: Continue supportive psychotherapy efforts and medications as indicated.  Pharmacological and Non-Pharmacological treatment options discussed during today's visit. Patient/Guardian acknowledged and verbally consented with current treatment plan and was educated on the importance of compliance with treatment and follow-up appointments.      MEDICATION ISSUES:  Discussed medication options and treatment plan of prescribed medication as well as the risks,  benefits, any black box warnings, and side effects including potential falls, possible impaired driving, and metabolic adversities among others. Patient is agreeable to call the office with any worsening of symptoms or onset of side effects, or if any concerns or questions arise.  The contact information for the office is made available to the patient. Patient is agreeable to call 911 or go to the nearest ER should they begin having any SI/HI, or if any urgent concerns arise. No medication side effects or related complaints today.     MEDS ORDERED DURING VISIT:  New Medications Ordered This Visit   Medications    amphetamine-dextroamphetamine XR (Adderall XR) 15 MG 24 hr capsule     Sig: Take 1 capsule by mouth Daily     Dispense:  30 capsule     Refill:  0       Follow Up Appointment:   Return in about 4 weeks (around 8/15/2024) for Recheck.             This document has been electronically signed by DEVEN Murillo  July 18, 2024 15:01 EDT    Some of the data in this electronic note has been brought forward from a previous encounter, any necessary changes have been made, it has been reviewed by this APRN, and it is accurate.    Dictated Utilizing Dragon Dictation: Part of this note may be an electronic transcription/translation of spoken language to printed text using the Dragon Dictation System.

## 2024-07-19 ENCOUNTER — OFFICE VISIT (OUTPATIENT)
Dept: INTERNAL MEDICINE | Facility: CLINIC | Age: 49
End: 2024-07-19
Payer: COMMERCIAL

## 2024-07-19 VITALS
DIASTOLIC BLOOD PRESSURE: 70 MMHG | HEIGHT: 73 IN | OXYGEN SATURATION: 100 % | HEART RATE: 112 BPM | SYSTOLIC BLOOD PRESSURE: 110 MMHG | BODY MASS INDEX: 24.52 KG/M2 | WEIGHT: 185 LBS

## 2024-07-19 DIAGNOSIS — F31.75 BIPOLAR DISORDER, IN PARTIAL REMISSION, MOST RECENT EPISODE DEPRESSED: ICD-10-CM

## 2024-07-19 DIAGNOSIS — R79.89 ABNORMAL TSH: ICD-10-CM

## 2024-07-19 DIAGNOSIS — R73.09 ABNORMAL GLUCOSE: ICD-10-CM

## 2024-07-19 DIAGNOSIS — E78.2 MIXED HYPERLIPIDEMIA: Primary | ICD-10-CM

## 2024-07-19 DIAGNOSIS — M51.37 DEGENERATION OF LUMBAR OR LUMBOSACRAL INTERVERTEBRAL DISC: ICD-10-CM

## 2024-07-19 DIAGNOSIS — I48.0 PAROXYSMAL ATRIAL FIBRILLATION: ICD-10-CM

## 2024-07-19 PROCEDURE — 80050 GENERAL HEALTH PANEL: CPT | Performed by: INTERNAL MEDICINE

## 2024-07-19 PROCEDURE — 80061 LIPID PANEL: CPT | Performed by: INTERNAL MEDICINE

## 2024-07-19 PROCEDURE — 83036 HEMOGLOBIN GLYCOSYLATED A1C: CPT | Performed by: INTERNAL MEDICINE

## 2024-07-19 RX ORDER — TIZANIDINE 4 MG/1
4 TABLET ORAL EVERY 8 HOURS PRN
Qty: 45 TABLET | Refills: 5 | Status: SHIPPED | OUTPATIENT
Start: 2024-07-19

## 2024-07-19 NOTE — PROGRESS NOTES
Patient is a 48 y.o. male who is here for a follow up of hyperlipidemia.  Chief Complaint   Patient presents with    Hyperlipidemia         HPI:    Here for mgmt of hyperlipidemia and afib.  No recurrence of afib.  No dizziness or lightheadedness.  No palpitations.  Compliant with meds.  Continues to have issues with back and knee pain.  Followed by pain mgmt.  Was recently started of Adderall.     History:     Patient Active Problem List   Diagnosis    Paroxysmal atrial fibrillation    Mixed hyperlipidemia    Primary osteoarthritis of both knees    Degeneration of lumbar or lumbosacral intervertebral disc    Toenail fungus    Bipolar affective disorder    Depression    Attention deficit hyperactivity disorder (ADHD)       Past Medical History:   Diagnosis Date    Nephrolithiasis        Past Surgical History:   Procedure Laterality Date    APPENDECTOMY  09/2010    CARDIAC ABLATION  11/2019    CERVICAL FUSION  10/2012    KNEE ARTHROPLASTY, PARTIAL REPLACEMENT  12/2022    KNEE ARTHROSCOPY  05/2021    SHOULDER ARTHROSCOPY         Current Outpatient Medications on File Prior to Visit   Medication Sig    amphetamine-dextroamphetamine XR (Adderall XR) 15 MG 24 hr capsule Take 1 capsule by mouth Daily    atorvastatin (LIPITOR) 20 MG tablet TAKE 1 TABLET BY MOUTH EVERY DAY IN THE EVENING    FLUoxetine (PROzac) 40 MG capsule Take 1 capsule by mouth Daily.    lamoTRIgine ER (LaMICtal XR) 100 MG tablet sustained-release 24 hour Take 1 tablet by mouth Daily.     No current facility-administered medications on file prior to visit.       Family History   Problem Relation Age of Onset    Heart attack Mother     Suicide Attempts Sister     Cancer Maternal Grandmother     Lung cancer Paternal Grandfather        Social History     Socioeconomic History    Marital status:     Number of children: 3   Tobacco Use    Smoking status: Never    Smokeless tobacco: Never   Vaping Use    Vaping status: Never Used   Substance and Sexual  "Activity    Alcohol use: Yes     Alcohol/week: 1.0 standard drink of alcohol     Types: 1 Cans of beer per week     Comment: 1 per day    Drug use: Never    Sexual activity: Defer         Review of Systems   Constitutional:  Negative for chills, fatigue, fever and unexpected weight change.   HENT:  Negative for congestion, ear pain, hearing loss, rhinorrhea, sinus pressure, sore throat and trouble swallowing.    Eyes:  Negative for discharge and itching.   Respiratory:  Negative for cough, chest tightness and shortness of breath.    Cardiovascular:  Negative for chest pain, palpitations and leg swelling.   Gastrointestinal:  Negative for abdominal pain, blood in stool, constipation, diarrhea and vomiting.        12/21 without polyps    Endocrine: Negative for polydipsia and polyuria.   Genitourinary:  Negative for difficulty urinating, dysuria, enuresis, frequency, hematuria and urgency.        1/24 psa   Musculoskeletal:  Positive for arthralgias. Negative for back pain, gait problem and joint swelling.   Skin:  Negative for rash and wound.   Allergic/Immunologic: Negative for immunocompromised state.   Neurological:  Negative for dizziness, syncope, weakness, light-headedness and numbness.   Hematological:  Does not bruise/bleed easily.   Psychiatric/Behavioral:  Negative for behavioral problems, dysphoric mood and sleep disturbance. The patient is not nervous/anxious.        /70   Pulse 112   Ht 185.4 cm (72.99\")   Wt 83.9 kg (185 lb)   SpO2 100%   BMI 24.41 kg/m²       Physical Exam  Constitutional:       Appearance: Normal appearance. He is well-developed.   HENT:      Head: Normocephalic and atraumatic.      Right Ear: External ear normal.      Left Ear: External ear normal.      Nose: Nose normal.      Mouth/Throat:      Mouth: Mucous membranes are moist.      Pharynx: Oropharynx is clear.   Eyes:      Extraocular Movements: Extraocular movements intact.      Conjunctiva/sclera: Conjunctivae " normal.      Pupils: Pupils are equal, round, and reactive to light.   Cardiovascular:      Rate and Rhythm: Normal rate and regular rhythm.      Pulses: Normal pulses.      Heart sounds: Normal heart sounds.   Pulmonary:      Effort: Pulmonary effort is normal.      Breath sounds: Normal breath sounds.   Abdominal:      General: Bowel sounds are normal.      Palpations: Abdomen is soft.   Genitourinary:     Comments: 12/21 colonoscopy noted  Musculoskeletal:      Cervical back: Normal range of motion and neck supple.      Comments: Pain on flexion past 45 degrees  Bilateral knee crepitus   Lymphadenopathy:      Cervical: No cervical adenopathy.   Skin:     General: Skin is warm and dry.   Neurological:      General: No focal deficit present.      Mental Status: He is alert and oriented to person, place, and time.   Psychiatric:         Mood and Affect: Mood normal.         Behavior: Behavior normal.         Thought Content: Thought content normal.         Procedure:      Discussion/Summary:    Hyperlipidemia-counseled on diet, fasting labs on Lipitor 20mg at goal  Hx of Afib-no recurrence with the ablation  Knee osteoarthritis-f/u Ortho  Bipolar/ADD/depression-well controlled on current tx, [facility] notes reviewed  Chronic back pain-f/u Dr Dumont, zanaflex prn  Elevated BS-will recheck HBAIC, counseled on low carb  High risk meds-labs      7/19 labs noted and dw patient, will recheck TSH in 3 months    Current Outpatient Medications:     amphetamine-dextroamphetamine XR (Adderall XR) 15 MG 24 hr capsule, Take 1 capsule by mouth Daily, Disp: 30 capsule, Rfl: 0    atorvastatin (LIPITOR) 20 MG tablet, TAKE 1 TABLET BY MOUTH EVERY DAY IN THE EVENING, Disp: 90 tablet, Rfl: 3    FLUoxetine (PROzac) 40 MG capsule, Take 1 capsule by mouth Daily., Disp: 90 capsule, Rfl: 0    lamoTRIgine ER (LaMICtal XR) 100 MG tablet sustained-release 24 hour, Take 1 tablet by mouth Daily., Disp: 90 tablet, Rfl: 0    tiZANidine (ZANAFLEX) 4 MG  tablet, Take 1 tablet by mouth Every 8 (Eight) Hours As Needed for Muscle Spasms., Disp: 45 tablet, Rfl: 5        Diagnoses and all orders for this visit:    1. Mixed hyperlipidemia (Primary)  -     Comprehensive Metabolic Panel  -     Lipid Panel  -     TSH    2. Paroxysmal atrial fibrillation  -     CBC (No Diff)    3. Bipolar disorder, in partial remission, most recent episode depressed    4. Degeneration of lumbar or lumbosacral intervertebral disc  -     tiZANidine (ZANAFLEX) 4 MG tablet; Take 1 tablet by mouth Every 8 (Eight) Hours As Needed for Muscle Spasms.  Dispense: 45 tablet; Refill: 5    5. Abnormal glucose  -     Hemoglobin A1c    6. Abnormal TSH  -     TSH; Future

## 2024-07-20 LAB
ALBUMIN SERPL-MCNC: 4.4 G/DL (ref 3.5–5.2)
ALBUMIN/GLOB SERPL: 1.6 G/DL
ALP SERPL-CCNC: 87 U/L (ref 39–117)
ALT SERPL W P-5'-P-CCNC: 33 U/L (ref 1–41)
ANION GAP SERPL CALCULATED.3IONS-SCNC: 11.7 MMOL/L (ref 5–15)
AST SERPL-CCNC: 34 U/L (ref 1–40)
BILIRUB SERPL-MCNC: 0.4 MG/DL (ref 0–1.2)
BUN SERPL-MCNC: 18 MG/DL (ref 6–20)
BUN/CREAT SERPL: 16.8 (ref 7–25)
CALCIUM SPEC-SCNC: 9.2 MG/DL (ref 8.6–10.5)
CHLORIDE SERPL-SCNC: 102 MMOL/L (ref 98–107)
CHOLEST SERPL-MCNC: 159 MG/DL (ref 0–200)
CO2 SERPL-SCNC: 24.3 MMOL/L (ref 22–29)
CREAT SERPL-MCNC: 1.07 MG/DL (ref 0.76–1.27)
DEPRECATED RDW RBC AUTO: 44.1 FL (ref 37–54)
EGFRCR SERPLBLD CKD-EPI 2021: 84.5 ML/MIN/1.73
ERYTHROCYTE [DISTWIDTH] IN BLOOD BY AUTOMATED COUNT: 13.5 % (ref 12.3–15.4)
GLOBULIN UR ELPH-MCNC: 2.8 GM/DL
GLUCOSE SERPL-MCNC: 87 MG/DL (ref 65–99)
HBA1C MFR BLD: 5.5 % (ref 4.8–5.6)
HCT VFR BLD AUTO: 44 % (ref 37.5–51)
HDLC SERPL-MCNC: 51 MG/DL (ref 40–60)
HGB BLD-MCNC: 14.3 G/DL (ref 13–17.7)
LDLC SERPL CALC-MCNC: 95 MG/DL (ref 0–100)
LDLC/HDLC SERPL: 1.85 {RATIO}
MCH RBC QN AUTO: 29.1 PG (ref 26.6–33)
MCHC RBC AUTO-ENTMCNC: 32.5 G/DL (ref 31.5–35.7)
MCV RBC AUTO: 89.6 FL (ref 79–97)
PLATELET # BLD AUTO: 172 10*3/MM3 (ref 140–450)
PMV BLD AUTO: 11.3 FL (ref 6–12)
POTASSIUM SERPL-SCNC: 4.5 MMOL/L (ref 3.5–5.2)
PROT SERPL-MCNC: 7.2 G/DL (ref 6–8.5)
RBC # BLD AUTO: 4.91 10*6/MM3 (ref 4.14–5.8)
SODIUM SERPL-SCNC: 138 MMOL/L (ref 136–145)
TRIGL SERPL-MCNC: 68 MG/DL (ref 0–150)
TSH SERPL DL<=0.05 MIU/L-ACNC: 5.62 UIU/ML (ref 0.27–4.2)
VLDLC SERPL-MCNC: 13 MG/DL (ref 5–40)
WBC NRBC COR # BLD AUTO: 7.21 10*3/MM3 (ref 3.4–10.8)

## 2024-08-12 DIAGNOSIS — F31.60 BIPOLAR AFFECTIVE DISORDER, MIXED: ICD-10-CM

## 2024-08-12 DIAGNOSIS — Z79.899 MEDICATION MANAGEMENT: ICD-10-CM

## 2024-08-12 RX ORDER — LAMOTRIGINE 100 MG/1
1 TABLET, EXTENDED RELEASE ORAL DAILY
Qty: 90 TABLET | Refills: 0 | Status: SHIPPED | OUTPATIENT
Start: 2024-08-12

## 2024-08-15 ENCOUNTER — TELEMEDICINE (OUTPATIENT)
Dept: PSYCHIATRY | Facility: CLINIC | Age: 49
End: 2024-08-15
Payer: COMMERCIAL

## 2024-08-15 DIAGNOSIS — F41.9 ANXIETY: Primary | ICD-10-CM

## 2024-08-15 DIAGNOSIS — F90.2 ATTENTION DEFICIT HYPERACTIVITY DISORDER (ADHD), COMBINED TYPE: ICD-10-CM

## 2024-08-15 DIAGNOSIS — Z79.899 MEDICATION MANAGEMENT: ICD-10-CM

## 2024-08-15 DIAGNOSIS — F31.60 BIPOLAR AFFECTIVE DISORDER, MIXED: ICD-10-CM

## 2024-08-15 DIAGNOSIS — F33.8 SEASONAL DEPRESSION: ICD-10-CM

## 2024-08-15 RX ORDER — BUSPIRONE HYDROCHLORIDE 10 MG/1
TABLET ORAL
Qty: 90 TABLET | Refills: 0 | Status: SHIPPED | OUTPATIENT
Start: 2024-08-15

## 2024-08-15 RX ORDER — FLUOXETINE HYDROCHLORIDE 40 MG/1
40 CAPSULE ORAL DAILY
Qty: 90 CAPSULE | Refills: 0 | Status: SHIPPED | OUTPATIENT
Start: 2024-08-15

## 2024-08-15 RX ORDER — DEXTROAMPHETAMINE SACCHARATE, AMPHETAMINE ASPARTATE MONOHYDRATE, DEXTROAMPHETAMINE SULFATE AND AMPHETAMINE SULFATE 3.75; 3.75; 3.75; 3.75 MG/1; MG/1; MG/1; MG/1
15 CAPSULE, EXTENDED RELEASE ORAL DAILY
Qty: 30 CAPSULE | Refills: 0 | Status: SHIPPED | OUTPATIENT
Start: 2024-08-15

## 2024-08-15 NOTE — PROGRESS NOTES
"This provider is located at the [facility] (through [facility]), [address], [facility], [address] using a secure video visit through [employer]. Patient is being seen remotely via telehealth at their home address in [place], and stated they are in a secure environment for this session.   The patient's condition being consulted/diagnosed/treated is appropriate for telemedicine. The provider identified herself as well as her credentials.   The patient, and/or patients guardian, consent to be seen remotely, and when consent is given they understand that the consent allows for patient identifiable information to be sent to a third party as needed. They may refuse to be seen remotely at any time. The electronic data is encrypted and password protected, and the patient and/or guardian has been advised of the potential risks to privacy not withstanding such measures.   The use of a video visit has been reviewed with the patient and verbal informed consent has been obtained.   Patient identifiers used: Name and .    Subjective   Ronaldo Cantu is a 48 y.o. male who presents today for follow up    Chief Complaint:    Chief Complaint   Patient presents with    Anxiety    Depression    Med Management    ADHD        History of Present Illness:   History of Present Illness  Patient is a 48-year-old male presenting for 1 month follow-up related to depression, anxiety, irritability and ADHD.  Voices compliance with medications, denies side effects.  Indicates extended release of Adderall has been beneficial, no longer feels dosage wearing off.  When asked if it is effective in addressing attention and restlessness he says \"yeah it seems to be, it is better than the other 1\" regarding use of Vyvanse.  Jaw clenching is \"minimal.\"  He does indicate he \"fidgets a lot but it does not bother me.\"  Continues with Lamictal, denies mood swings.  Acknowledges situational irritability " "attributed by anxiety.  Voices compliance with Prozac.  PHQ reduced from 2-0, negative for depression with patient rates as 0/10 on average \"not.\"  JACQUELINE remains same with score of 2 indicating minimal anxiety which patient rates as 6/10 on average.  He states irritability is \"all triggered by my surroundings.\"  Largest concern today is \"stress and anxiety.\"  Sleep is somewhat impaired, mostly due to shoulder pain \"I cannot sleep if I roll over.\"  Also indicates back pain.  Recently had epidural performed.  Is to see ortho regarding his shoulder.  Denies SI, HI, SIB, or hallucinations currently and is convincing.  Appetite adequate.  Medically he has had follow-up on cholesterol, blood pressure adequate.  Sees cardiology later this month.  Has TSH reordered, they are monitoring his thyroid.    Patient resides in a home with his wife of 8 years whom he cites as supportive.  He has 1 biological child and 2 stepchildren, all adults.  He has a college degree in biology and is now retired.  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  This has been stressful.  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  [employer] preference.  Enjoys golfing.  Denies drug or alcohol use.  Looking forward to vacation with family in [place] next week.      The following portions of the patient's history were reviewed and updated as appropriate: allergies, current medications, past family history, past medical history, past social history, past surgical history and problem list.    Past Psychiatric History:  Began Treatment: 7 years ago for anger management  Diagnoses: unsure  Psychiatrist:Denies  Therapist: previously  Admission History:Denies  Medication Trials: effexor straterra (more irritable) and quelbree( suicidal), clonidine (increased thirst), wellbutrin (made worse), vyvanse (side effects-jaw clenching), adderall 7.5 BID (wore off)  Self Harm: Denies  Suicide Attempts:Denies   Psychosis, Anxiety, Depression:  " N/A    Past Medical History:  Past Medical History:   Diagnosis Date    Nephrolithiasis        Substance Abuse History:   Types:Denies all, including illicit  Withdrawal Symptoms:Denies  Longest Period Sober:Not Applicable   AA: Not applicable     Social History:  Social History     Socioeconomic History    Marital status:     Number of children: 3   Tobacco Use    Smoking status: Never    Smokeless tobacco: Never   Vaping Use    Vaping status: Never Used   Substance and Sexual Activity    Alcohol use: Yes     Alcohol/week: 1.0 standard drink of alcohol     Types: 1 Cans of beer per week     Comment: 1 per day    Drug use: Never    Sexual activity: Defer       Family History:  Family History   Problem Relation Age of Onset    Heart attack Mother     Suicide Attempts Sister     Cancer Maternal Grandmother     Lung cancer Paternal Grandfather        Past Surgical History:  Past Surgical History:   Procedure Laterality Date    APPENDECTOMY  09/2010    CARDIAC ABLATION  11/2019    CERVICAL FUSION  10/2012    KNEE ARTHROPLASTY, PARTIAL REPLACEMENT  12/2022    KNEE ARTHROSCOPY  05/2021    SHOULDER ARTHROSCOPY         Problem List:  Patient Active Problem List   Diagnosis    Paroxysmal atrial fibrillation    Mixed hyperlipidemia    Primary osteoarthritis of both knees    Degeneration of lumbar or lumbosacral intervertebral disc    Toenail fungus    Bipolar affective disorder    Depression    Attention deficit hyperactivity disorder (ADHD)       Allergy:   Allergies   Allergen Reactions    Viloxazine Hcl Er Mental Status Change     Suicidal thoughts        Current Medications:   Current Outpatient Medications   Medication Sig Dispense Refill    amphetamine-dextroamphetamine XR (Adderall XR) 15 MG 24 hr capsule Take 1 capsule by mouth Daily 30 capsule 0    FLUoxetine (PROzac) 40 MG capsule Take 1 capsule by mouth Daily. 90 capsule 0    atorvastatin (LIPITOR) 20 MG tablet TAKE 1 TABLET BY MOUTH EVERY DAY IN THE EVENING  90 tablet 3    busPIRone (BUSPAR) 10 MG tablet Take 1 tablet by mouth twice daily x 1 week, then increase to 1 tab by mouth 3 times daily thereafter. 90 tablet 0    lamoTRIgine  MG tablet sustained-release 24 hour TAKE 1 TABLET BY MOUTH EVERY DAY 90 tablet 0    tiZANidine (ZANAFLEX) 4 MG tablet Take 1 tablet by mouth Every 8 (Eight) Hours As Needed for Muscle Spasms. 45 tablet 5     No current facility-administered medications for this visit.       Review of Systems:    Review of Systems   Constitutional: Negative.    HENT: Negative.     Eyes: Negative.    Respiratory: Negative.     Cardiovascular: Negative.    Gastrointestinal: Negative.    Endocrine: Negative.    Genitourinary: Negative.         Kidney stone   Musculoskeletal:  Positive for back pain.   Skin: Negative.    Allergic/Immunologic: Negative.    Neurological: Negative.  Negative for seizures.   Hematological: Negative.    Psychiatric/Behavioral:  Positive for positive for hyperactivity. The patient is nervous/anxious.          Physical Exam:   Physical Exam  Constitutional:       Appearance: Normal appearance. He is normal weight.   HENT:      Head: Normocephalic.      Nose: Nose normal.   Pulmonary:      Effort: Pulmonary effort is normal.   Musculoskeletal:         General: Normal range of motion.      Cervical back: Normal range of motion.   Neurological:      General: No focal deficit present.      Mental Status: He is alert and oriented to person, place, and time. Mental status is at baseline.   Psychiatric:         Attention and Perception: Attention and perception normal.         Mood and Affect: Affect normal. Mood is anxious.         Speech: Speech normal.         Behavior: Behavior normal. Behavior is cooperative.         Thought Content: Thought content normal.         Cognition and Memory: Cognition and memory normal.         Judgment: Judgment normal.     Vitals:  There were no vitals taken for this visit. There is no height or weight  on file to calculate BMI.  Due to extenuating circumstances and possible current health risks associated with the patient being present in a clinical setting (with current health restrictions in place in regards to possible COVID 19 transmission/exposure), the patient was seen remotely today via a MyChart Video Visit through [facility].  Unable to obtain vital signs due to nature of remote visit.  Height stated at 6ft1 inches.  Weight stated at 183 pounds.    Last 3 Blood Pressure Readings:  BP Readings from Last 3 Encounters:   07/19/24 110/70   04/12/24 108/70   01/20/24 120/68     PHQ-9 Depression Screening  Little interest or pleasure in doing things? (P) 0-->not at all   Feeling down, depressed, or hopeless? (P) 0-->not at all   Trouble falling or staying asleep, or sleeping too much? (P) 0-->not at all   Feeling tired or having little energy? (P) 0-->not at all   Poor appetite or overeating? (P) 0-->not at all   Feeling bad about yourself - or that you are a failure or have let yourself or your family down? (P) 0-->not at all   Trouble concentrating on things, such as reading the newspaper or watching television? (P) 0-->not at all   Moving or speaking so slowly that other people could have noticed? Or the opposite - being so fidgety or restless that you have been moving around a lot more than usual? (P) 0-->not at all   Thoughts that you would be better off dead, or of hurting yourself in some way? (P) 0-->not at all   PHQ-9 Total Score (P) 0   If you checked off any problems, how difficult have these problems made it for you to do your work, take care of things at home, or get along with other people? (P) not difficult at all     JACQUELINE: 0    Mental Status Exam:   Hygiene:   good  Cooperation:  Cooperative  Eye Contact:  Good  Psychomotor Behavior:  Restless  Affect:  Appropriate  Mood: normal and anxious  Hopelessness: 4  Speech:  Rapid  Thought Process:  Goal directed  Thought Content:  Normal  Suicidal:   None  Homicidal:  None  Hallucinations:  None  Delusion:  None  Memory:  Intact  Orientation:  Grossly intact  Reliability:  good  Insight:  Good  Judgement:  Fair  Impulse Control:  Fair  Physical/Medical Issues:   back surgeries, knee surgeries, cardiac ablation 2019        Lab Results:   Office Visit on 07/19/2024   Component Date Value Ref Range Status    WBC 07/19/2024 7.21  3.40 - 10.80 10*3/mm3 Final    RBC 07/19/2024 4.91  4.14 - 5.80 10*6/mm3 Final    Hemoglobin 07/19/2024 14.3  13.0 - 17.7 g/dL Final    Hematocrit 07/19/2024 44.0  37.5 - 51.0 % Final    MCV 07/19/2024 89.6  79.0 - 97.0 fL Final    MCH 07/19/2024 29.1  26.6 - 33.0 pg Final    MCHC 07/19/2024 32.5  31.5 - 35.7 g/dL Final    RDW 07/19/2024 13.5  12.3 - 15.4 % Final    RDW-SD 07/19/2024 44.1  37.0 - 54.0 fl Final    MPV 07/19/2024 11.3  6.0 - 12.0 fL Final    Platelets 07/19/2024 172  140 - 450 10*3/mm3 Final    Glucose 07/19/2024 87  65 - 99 mg/dL Final    BUN 07/19/2024 18  6 - 20 mg/dL Final    Creatinine 07/19/2024 1.07  0.76 - 1.27 mg/dL Final    Sodium 07/19/2024 138  136 - 145 mmol/L Final    Potassium 07/19/2024 4.5  3.5 - 5.2 mmol/L Final    Chloride 07/19/2024 102  98 - 107 mmol/L Final    CO2 07/19/2024 24.3  22.0 - 29.0 mmol/L Final    Calcium 07/19/2024 9.2  8.6 - 10.5 mg/dL Final    Total Protein 07/19/2024 7.2  6.0 - 8.5 g/dL Final    Albumin 07/19/2024 4.4  3.5 - 5.2 g/dL Final    ALT (SGPT) 07/19/2024 33  1 - 41 U/L Final    AST (SGOT) 07/19/2024 34  1 - 40 U/L Final    Alkaline Phosphatase 07/19/2024 87  39 - 117 U/L Final    Total Bilirubin 07/19/2024 0.4  0.0 - 1.2 mg/dL Final    Globulin 07/19/2024 2.8  gm/dL Final    A/G Ratio 07/19/2024 1.6  g/dL Final    BUN/Creatinine Ratio 07/19/2024 16.8  7.0 - 25.0 Final    Anion Gap 07/19/2024 11.7  5.0 - 15.0 mmol/L Final    eGFR 07/19/2024 84.5  >60.0 mL/min/1.73 Final    Hemoglobin A1C 07/19/2024 5.50  4.80 - 5.60 % Final    Total Cholesterol 07/19/2024 159  0 - 200 mg/dL Final     Triglycerides 07/19/2024 68  0 - 150 mg/dL Final    HDL Cholesterol 07/19/2024 51  40 - 60 mg/dL Final    LDL Cholesterol  07/19/2024 95  0 - 100 mg/dL Final    VLDL Cholesterol 07/19/2024 13  5 - 40 mg/dL Final    LDL/HDL Ratio 07/19/2024 1.85   Final    TSH 07/19/2024 5.620 (H)  0.270 - 4.200 uIU/mL Final   Hospital Outpatient Visit on 04/12/2024   Component Date Value Ref Range Status    BH CV VAS BP RIGHT ARM 04/12/2024 114/87  mmHg Final    EF(MOD-bp) 04/12/2024 57.0  % Final    LVIDd 04/12/2024 4.9  cm Final    LVIDs 04/12/2024 3.2  cm Final    IVSd 04/12/2024 0.80  cm Final    LVPWd 04/12/2024 0.80  cm Final    FS 04/12/2024 34.7  % Final    IVS/LVPW 04/12/2024 1.00  cm Final    ESV(cubed) 04/12/2024 32.8  ml Final    LV Sys Vol (BSA corrected) 04/12/2024 22.3  cm2 Final    EDV(cubed) 04/12/2024 117.6  ml Final    LV Garcia Vol (BSA corrected) 04/12/2024 51.8  cm2 Final    LV mass(C)d 04/12/2024 131.2  grams Final    LVOT area 04/12/2024 3.1  cm2 Final    LVOT diam 04/12/2024 2.00  cm Final    EDV(MOD-sp2) 04/12/2024 96.6  ml Final    EDV(MOD-sp4) 04/12/2024 108.0  ml Final    ESV(MOD-sp2) 04/12/2024 41.0  ml Final    ESV(MOD-sp4) 04/12/2024 46.5  ml Final    SV(MOD-sp2) 04/12/2024 55.6  ml Final    SV(MOD-sp4) 04/12/2024 61.5  ml Final    SVi(MOD-SP2) 04/12/2024 26.6  ml/m2 Final    SVi(MOD-SP4) 04/12/2024 29.5  ml/m2 Final    EF(MOD-sp2) 04/12/2024 57.6  % Final    EF(MOD-sp4) 04/12/2024 56.9  % Final    MV E max gilmar 04/12/2024 46.3  cm/sec Final    MV A max gilmar 04/12/2024 40.8  cm/sec Final    MV dec time 04/12/2024 0.19  sec Final    MV E/A 04/12/2024 1.13   Final    LA ESV Index (BP) 04/12/2024 22.8  ml/m2 Final    Med Peak E' Gilmar 04/12/2024 10.8  cm/sec Final    Lat Peak E' Gilmar 04/12/2024 13.1  cm/sec Final    Avg E/e' ratio 04/12/2024 3.87   Final    SV(LVOT) 04/12/2024 49.6  ml Final    RV Base 04/12/2024 2.8  cm Final    RV Mid 04/12/2024 2.20  cm Final    RV Length 04/12/2024 7.7  cm Final    TAPSE  (>1.6) 04/12/2024 2.9  cm Final    RV S' 04/12/2024 11.5  cm/sec Final    LA dimension (2D)  04/12/2024 3.3  cm Final    LV V1 max 04/12/2024 88.7  cm/sec Final    LV V1 max PG 04/12/2024 3.1  mmHg Final    LV V1 mean PG 04/12/2024 2.00  mmHg Final    LV V1 VTI 04/12/2024 15.8  cm Final    Ao pk kim 04/12/2024 125.0  cm/sec Final    Ao max PG 04/12/2024 6.3  mmHg Final    Ao mean PG 04/12/2024 3.0  mmHg Final    Ao V2 VTI 04/12/2024 22.6  cm Final    CANDIE(I,D) 04/12/2024 2.20  cm2 Final    MV max PG 04/12/2024 1.29  mmHg Final    MV mean PG 04/12/2024 1.00  mmHg Final    MV V2 VTI 04/12/2024 16.8  cm Final    MV P1/2t 04/12/2024 67.5  msec Final    MVA(P1/2t) 04/12/2024 3.3  cm2 Final    MVA(VTI) 04/12/2024 3.0  cm2 Final    MV dec slope 04/12/2024 268.0  cm/sec2 Final    PA acc time 04/12/2024 0.14  sec Final    Ao root diam 04/12/2024 3.7  cm Final         Assessment & Plan   Problems Addressed this Visit          Mental Health    Attention deficit hyperactivity disorder (ADHD)    Relevant Medications    amphetamine-dextroamphetamine XR (Adderall XR) 15 MG 24 hr capsule    FLUoxetine (PROzac) 40 MG capsule    busPIRone (BUSPAR) 10 MG tablet     Other Visit Diagnoses       Anxiety    -  Primary    Relevant Medications    busPIRone (BUSPAR) 10 MG tablet    Seasonal depression        Relevant Medications    amphetamine-dextroamphetamine XR (Adderall XR) 15 MG 24 hr capsule    FLUoxetine (PROzac) 40 MG capsule    busPIRone (BUSPAR) 10 MG tablet    Bipolar affective disorder, mixed        Relevant Medications    amphetamine-dextroamphetamine XR (Adderall XR) 15 MG 24 hr capsule    FLUoxetine (PROzac) 40 MG capsule    busPIRone (BUSPAR) 10 MG tablet    Medication management        Relevant Medications    amphetamine-dextroamphetamine XR (Adderall XR) 15 MG 24 hr capsule    FLUoxetine (PROzac) 40 MG capsule    busPIRone (BUSPAR) 10 MG tablet          Diagnoses         Codes Comments    Anxiety    -  Primary ICD-10-CM:  F41.9  ICD-9-CM: 300.00     Attention deficit hyperactivity disorder (ADHD), combined type     ICD-10-CM: F90.2  ICD-9-CM: 314.01     Seasonal depression     ICD-10-CM: F33.8  ICD-9-CM: 296.99     Bipolar affective disorder, mixed     ICD-10-CM: F31.60  ICD-9-CM: 296.60     Medication management     ICD-10-CM: Z79.899  ICD-9-CM: V58.69             Visit Diagnoses:    ICD-10-CM ICD-9-CM   1. Anxiety  F41.9 300.00   2. Attention deficit hyperactivity disorder (ADHD), combined type  F90.2 314.01   3. Seasonal depression  F33.8 296.99   4. Bipolar affective disorder, mixed  F31.60 296.60   5. Medication management  Z79.899 V58.69         Continue Prozac, Lamictal, Adderall.  Refills provided.  We will start BuSpar 10 mg twice daily daily x 1 week and if well tolerated increase to 3 times daily. Patient is educated upon risks versus benefits as well as side effects and when to seek care in an emergency setting.  Patient verbalized understanding.  Juan David reviewed and appropriate per her report number [id number].  Follow up with this provider in 4 weeks or sooner if needed.    The patient is being prescribed a controlled substance as part of the treatment plan. The patient has been educated of appropriate use of the medication(s), including risks and side effects such as insomnia, headache, exacerbation of tics, nervousness, irritability, overstimulation, tremor, dizziness, anorexia or change in appetite, nausea, dry mouth, constipation, diarrhea, weight loss, sexual dysfunction, psychotic episodes, seizures, palpitations, tachycardia, hypertension, rare activation (activation of hypomania, lesa, and/or suicidal ideations), cardiovascular adverse effects including sudden death (especially patients with pre-existing structural abnormalities often associated with a family history of cardiac disease), may slow normal growth in children, weight gain is reported but not expected, sedation is possible but activation is much more  common, metabolic adversities, the risk of tolerance and dependence, and overdose among others. Without the prescribed medication for ADHD, it is reported that the patient has problems with attention and focus including being easily distracted, easily losing objects, trouble with time management, trouble completing tasks because of distractions, procrastination, indecisiveness, careless mistakes in daily activities/work, and not finishing jobs that are started. The goals and objectives with treatment have been discussed including management of reported symptoms of ADHD, to minimize the impact of ADHD symptoms on patient function while maximizing the patient's ability to compensate or cope with any remaining difficulties, and to assist the patient with being successful and productive in their life/daily pursuits. The patient denies any side effects, no cardiovascular symptoms including palpitations or hypertension, no development or worsening of insomnia, and no development or worsening of anxiety symptoms on the medication. Due to the reported problems without the medication usage, as well as the reported significant improvement in symptoms with the medication usage, the patient requests to remain on the prescribed medication for ADHD and does not feel tapering or coming off of medication at this time is appropriate. The reported symptoms of ADHD, any reported resolution of symptoms, and the necessity and appropriateness for continued treatment with a controlled substance will be reevaluated at each encounter. Patient is also informed that the medication is to be used by the patient only, the medication is to be used only as directed, and the medication should not be combined with other substances, OTC or prescription, unless directed by a Provider/Prescriber. The patient verbalized understanding and agreement with this in their own words, and wish to continue with the current treatment plan.       GOALS:  Short  Term Goals: Patient will be compliant with medication, and patient will have no significant medication related side effects.  Patient will be engaged in psychotherapy as indicated.  Patient will report subjective improvement of symptoms.  Long term goals: To stabilize mood and treat/improve subjective symptoms, the patient will stay out of the hospital, the patient will be at an optimal level of functioning, and the patient will take all medications as prescribed.  The patient/guardian verbalized understanding and agreement with goals that were mutually set.      TREATMENT PLAN: Continue supportive psychotherapy efforts and medications as indicated.  Pharmacological and Non-Pharmacological treatment options discussed during today's visit. Patient/Guardian acknowledged and verbally consented with current treatment plan and was educated on the importance of compliance with treatment and follow-up appointments.      MEDICATION ISSUES:  Discussed medication options and treatment plan of prescribed medication as well as the risks, benefits, any black box warnings, and side effects including potential falls, possible impaired driving, and metabolic adversities among others. Patient is agreeable to call the office with any worsening of symptoms or onset of side effects, or if any concerns or questions arise.  The contact information for the office is made available to the patient. Patient is agreeable to call 911 or go to the nearest ER should they begin having any SI/HI, or if any urgent concerns arise. No medication side effects or related complaints today.     MEDS ORDERED DURING VISIT:  New Medications Ordered This Visit   Medications    amphetamine-dextroamphetamine XR (Adderall XR) 15 MG 24 hr capsule     Sig: Take 1 capsule by mouth Daily     Dispense:  30 capsule     Refill:  0    FLUoxetine (PROzac) 40 MG capsule     Sig: Take 1 capsule by mouth Daily.     Dispense:  90 capsule     Refill:  0    busPIRone (BUSPAR)  10 MG tablet     Sig: Take 1 tablet by mouth twice daily x 1 week, then increase to 1 tab by mouth 3 times daily thereafter.     Dispense:  90 tablet     Refill:  0       Follow Up Appointment:   Return in about 4 weeks (around 9/12/2024) for Recheck.             This document has been electronically signed by DEVEN Murillo  August 15, 2024 13:01 EDT    Some of the data in this electronic note has been brought forward from a previous encounter, any necessary changes have been made, it has been reviewed by this APRN, and it is accurate.    Dictated Utilizing Dragon Dictation: Part of this note may be an electronic transcription/translation of spoken language to printed text using the Dragon Dictation System.

## 2024-08-28 ENCOUNTER — LAB (OUTPATIENT)
Dept: INTERNAL MEDICINE | Facility: CLINIC | Age: 49
End: 2024-08-28
Payer: COMMERCIAL

## 2024-08-28 DIAGNOSIS — R79.89 ABNORMAL TSH: ICD-10-CM

## 2024-08-28 LAB — TSH SERPL DL<=0.05 MIU/L-ACNC: 4.67 UIU/ML (ref 0.27–4.2)

## 2024-08-28 PROCEDURE — 36415 COLL VENOUS BLD VENIPUNCTURE: CPT | Performed by: INTERNAL MEDICINE

## 2024-08-28 PROCEDURE — 84443 ASSAY THYROID STIM HORMONE: CPT | Performed by: INTERNAL MEDICINE

## 2024-09-12 ENCOUNTER — TELEMEDICINE (OUTPATIENT)
Dept: PSYCHIATRY | Facility: CLINIC | Age: 49
End: 2024-09-12
Payer: COMMERCIAL

## 2024-09-12 DIAGNOSIS — F41.9 ANXIETY: ICD-10-CM

## 2024-09-12 DIAGNOSIS — F31.60 BIPOLAR AFFECTIVE DISORDER, MIXED: Primary | ICD-10-CM

## 2024-09-12 DIAGNOSIS — F90.2 ATTENTION DEFICIT HYPERACTIVITY DISORDER (ADHD), COMBINED TYPE: ICD-10-CM

## 2024-09-12 DIAGNOSIS — Z79.899 MEDICATION MANAGEMENT: ICD-10-CM

## 2024-09-12 DIAGNOSIS — R41.840 CONCENTRATION DEFICIT: ICD-10-CM

## 2024-09-12 RX ORDER — DEXTROAMPHETAMINE SACCHARATE, AMPHETAMINE ASPARTATE MONOHYDRATE, DEXTROAMPHETAMINE SULFATE AND AMPHETAMINE SULFATE 3.75; 3.75; 3.75; 3.75 MG/1; MG/1; MG/1; MG/1
15 CAPSULE, EXTENDED RELEASE ORAL DAILY
Qty: 30 CAPSULE | Refills: 0 | Status: SHIPPED | OUTPATIENT
Start: 2024-09-12

## 2024-09-12 RX ORDER — BUSPIRONE HYDROCHLORIDE 10 MG/1
TABLET ORAL
Qty: 90 TABLET | Refills: 0 | Status: SHIPPED | OUTPATIENT
Start: 2024-09-12

## 2024-09-12 NOTE — PROGRESS NOTES
"This provider is located at the [facility] (through [facility]), [address], [facility], [address] using a secure video visit through [employer]. Patient is being seen remotely via telehealth at their home address in [place], and stated they are in a secure environment for this session.   The patient's condition being consulted/diagnosed/treated is appropriate for telemedicine. The provider identified herself as well as her credentials.   The patient, and/or patients guardian, consent to be seen remotely, and when consent is given they understand that the consent allows for patient identifiable information to be sent to a third party as needed. They may refuse to be seen remotely at any time. The electronic data is encrypted and password protected, and the patient and/or guardian has been advised of the potential risks to privacy not withstanding such measures.   The use of a video visit has been reviewed with the patient and verbal informed consent has been obtained.   Patient identifiers used: Name and .    Subjective   Ronaldo Cantu is a 48 y.o. male who presents today for follow up    Chief Complaint:    Chief Complaint   Patient presents with    Anxiety    Depression    Med Management    ADHD    Irritable        History of Present Illness:   History of Present Illness  Patient is a 48-year-old male presenting for 1 month follow-up related to depression, anxiety, irritability and ADHD.  Voices compliance with medications, denies side effects.  He has tolerated initiation of BuSpar well, \"yeah it is good.\"  Voices compliance with medications, denies side effects.  Has only been utilizing BuSpar once daily which seems to help with anxiety.  PHQ remains 7 score of 0 negative for depression with patient rates as 0/10 on average \"no depression.\"  He denies mood lability with use of Lamictal.  States he has experienced intermittent depression episodes \"not prolonged, " "it does not stick with me.\"  JACQUELINE reduced from 2-0, negative for anxiety which patient rates as 0/10 on average.  He denies SI, HI, SIB, or hallucinations currently and is convincing.  Denies appetite changes.  Denies mood lability.  Medically has been assessed for hypertension, had follow-up with cardiology, EKG normal, cardiology aware of stimulant use and provided clearance.  Patient to follow up with them in 6 months to 1 year.  Denies alternate medical concerns, continues to pursue disability.    Patient resides in a home with his wife of 8 years whom he cites as supportive.  He has 1 biological child and 2 stepchildren, all adults.  He has a college degree in biology and is now retired.  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  This has been stressful.  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  Jewish Yazidism preference.  Enjoys golfing.  Denies drug or alcohol use.  Trip to [place] positive, looking forward to concerts with friends.      The following portions of the patient's history were reviewed and updated as appropriate: allergies, current medications, past family history, past medical history, past social history, past surgical history and problem list.    Past Psychiatric History:  Began Treatment: 7 years ago for anger management  Diagnoses: unsure  Psychiatrist:Denies  Therapist: previously  Admission History:Denies  Medication Trials: effexor straterra (more irritable) and quelbree( suicidal), clonidine (increased thirst), wellbutrin (made worse), vyvanse (side effects-jaw clenching), adderall 7.5 BID (wore off)  Self Harm: Denies  Suicide Attempts:Denies   Psychosis, Anxiety, Depression:  N/A    Past Medical History:  Past Medical History:   Diagnosis Date    Nephrolithiasis        Substance Abuse History:   Types:Denies all, including illicit  Withdrawal Symptoms:Denies  Longest Period Sober:Not Applicable   AA: Not applicable     Social History:  Social History "     Socioeconomic History    Marital status:     Number of children: 3   Tobacco Use    Smoking status: Never    Smokeless tobacco: Never   Vaping Use    Vaping status: Never Used   Substance and Sexual Activity    Alcohol use: Yes     Alcohol/week: 1.0 standard drink of alcohol     Types: 1 Cans of beer per week     Comment: 1 per day    Drug use: Never    Sexual activity: Defer       Family History:  Family History   Problem Relation Age of Onset    Heart attack Mother     Suicide Attempts Sister     Cancer Maternal Grandmother     Lung cancer Paternal Grandfather        Past Surgical History:  Past Surgical History:   Procedure Laterality Date    APPENDECTOMY  09/2010    CARDIAC ABLATION  11/2019    CERVICAL FUSION  10/2012    KNEE ARTHROPLASTY, PARTIAL REPLACEMENT  12/2022    KNEE ARTHROSCOPY  05/2021    SHOULDER ARTHROSCOPY         Problem List:  Patient Active Problem List   Diagnosis    Paroxysmal atrial fibrillation    Mixed hyperlipidemia    Primary osteoarthritis of both knees    Degeneration of lumbar or lumbosacral intervertebral disc    Toenail fungus    Bipolar affective disorder    Depression    Attention deficit hyperactivity disorder (ADHD)       Allergy:   Allergies   Allergen Reactions    Viloxazine Hcl Er Mental Status Change     Suicidal thoughts        Current Medications:   Current Outpatient Medications   Medication Sig Dispense Refill    amphetamine-dextroamphetamine XR (Adderall XR) 15 MG 24 hr capsule Take 1 capsule by mouth Daily 30 capsule 0    busPIRone (BUSPAR) 10 MG tablet Take 1 tablet by mouth daily. 90 tablet 0    atorvastatin (LIPITOR) 20 MG tablet TAKE 1 TABLET BY MOUTH EVERY DAY IN THE EVENING 90 tablet 3    FLUoxetine (PROzac) 40 MG capsule Take 1 capsule by mouth Daily. 90 capsule 0    lamoTRIgine  MG tablet sustained-release 24 hour TAKE 1 TABLET BY MOUTH EVERY DAY 90 tablet 0    tiZANidine (ZANAFLEX) 4 MG tablet Take 1 tablet by mouth Every 8 (Eight) Hours As  Needed for Muscle Spasms. 45 tablet 5     No current facility-administered medications for this visit.       Review of Systems:    Review of Systems   Constitutional: Negative.    HENT: Negative.     Eyes: Negative.    Respiratory: Negative.     Cardiovascular: Negative.    Gastrointestinal: Negative.    Endocrine: Negative.    Genitourinary: Negative.         Kidney stone   Musculoskeletal:  Positive for back pain.   Skin: Negative.    Allergic/Immunologic: Negative.    Neurological: Negative.  Negative for seizures.   Hematological: Negative.    Psychiatric/Behavioral: Negative.           Physical Exam:   Physical Exam  Constitutional:       Appearance: Normal appearance. He is normal weight.   HENT:      Head: Normocephalic.      Nose: Nose normal.   Pulmonary:      Effort: Pulmonary effort is normal.   Musculoskeletal:         General: Normal range of motion.      Cervical back: Normal range of motion.   Neurological:      General: No focal deficit present.      Mental Status: He is alert and oriented to person, place, and time. Mental status is at baseline.   Psychiatric:         Attention and Perception: Attention and perception normal.         Mood and Affect: Mood and affect normal.         Speech: Speech normal.         Behavior: Behavior normal. Behavior is cooperative.         Thought Content: Thought content normal.         Cognition and Memory: Cognition and memory normal.         Judgment: Judgment normal.     Vitals:  There were no vitals taken for this visit. There is no height or weight on file to calculate BMI.  Due to extenuating circumstances and possible current health risks associated with the patient being present in a clinical setting (with current health restrictions in place in regards to possible COVID 19 transmission/exposure), the patient was seen remotely today via a MyChart Video Visit through [employer].  Unable to obtain vital signs due to nature of remote visit.  Height stated at 6ft1  inches.  Weight stated at 183 pounds.    Last 3 Blood Pressure Readings:  BP Readings from Last 3 Encounters:   07/19/24 110/70   04/12/24 108/70   01/20/24 120/68     PHQ-9 Depression Screening  Little interest or pleasure in doing things? (P) 0-->not at all   Feeling down, depressed, or hopeless? (P) 0-->not at all   Trouble falling or staying asleep, or sleeping too much? (P) 0-->not at all   Feeling tired or having little energy? (P) 0-->not at all   Poor appetite or overeating? (P) 0-->not at all   Feeling bad about yourself - or that you are a failure or have let yourself or your family down? (P) 0-->not at all   Trouble concentrating on things, such as reading the newspaper or watching television? (P) 0-->not at all   Moving or speaking so slowly that other people could have noticed? Or the opposite - being so fidgety or restless that you have been moving around a lot more than usual? (P) 0-->not at all   Thoughts that you would be better off dead, or of hurting yourself in some way? (P) 0-->not at all   PHQ-9 Total Score (P) 0   If you checked off any problems, how difficult have these problems made it for you to do your work, take care of things at home, or get along with other people?       JACQUELINE: 0    Mental Status Exam:   Hygiene:   good  Cooperation:  Cooperative  Eye Contact:  Good  Psychomotor Behavior:  Appropriate  Affect:  Appropriate  Mood: normal  Hopelessness: Denies  Speech:  Rapid  Thought Process:  Goal directed  Thought Content:  Normal  Suicidal:  None  Homicidal:  None  Hallucinations:  None  Delusion:  None  Memory:  Intact  Orientation:  Grossly intact  Reliability:  good  Insight:  Good  Judgement:  Fair  Impulse Control:  Fair  Physical/Medical Issues:   back surgeries, knee surgeries, cardiac ablation 2019        Lab Results:   Lab on 08/28/2024   Component Date Value Ref Range Status    TSH 08/28/2024 4.670 (H)  0.270 - 4.200 uIU/mL Final   Office Visit on 07/19/2024   Component Date  Value Ref Range Status    WBC 07/19/2024 7.21  3.40 - 10.80 10*3/mm3 Final    RBC 07/19/2024 4.91  4.14 - 5.80 10*6/mm3 Final    Hemoglobin 07/19/2024 14.3  13.0 - 17.7 g/dL Final    Hematocrit 07/19/2024 44.0  37.5 - 51.0 % Final    MCV 07/19/2024 89.6  79.0 - 97.0 fL Final    MCH 07/19/2024 29.1  26.6 - 33.0 pg Final    MCHC 07/19/2024 32.5  31.5 - 35.7 g/dL Final    RDW 07/19/2024 13.5  12.3 - 15.4 % Final    RDW-SD 07/19/2024 44.1  37.0 - 54.0 fl Final    MPV 07/19/2024 11.3  6.0 - 12.0 fL Final    Platelets 07/19/2024 172  140 - 450 10*3/mm3 Final    Glucose 07/19/2024 87  65 - 99 mg/dL Final    BUN 07/19/2024 18  6 - 20 mg/dL Final    Creatinine 07/19/2024 1.07  0.76 - 1.27 mg/dL Final    Sodium 07/19/2024 138  136 - 145 mmol/L Final    Potassium 07/19/2024 4.5  3.5 - 5.2 mmol/L Final    Chloride 07/19/2024 102  98 - 107 mmol/L Final    CO2 07/19/2024 24.3  22.0 - 29.0 mmol/L Final    Calcium 07/19/2024 9.2  8.6 - 10.5 mg/dL Final    Total Protein 07/19/2024 7.2  6.0 - 8.5 g/dL Final    Albumin 07/19/2024 4.4  3.5 - 5.2 g/dL Final    ALT (SGPT) 07/19/2024 33  1 - 41 U/L Final    AST (SGOT) 07/19/2024 34  1 - 40 U/L Final    Alkaline Phosphatase 07/19/2024 87  39 - 117 U/L Final    Total Bilirubin 07/19/2024 0.4  0.0 - 1.2 mg/dL Final    Globulin 07/19/2024 2.8  gm/dL Final    A/G Ratio 07/19/2024 1.6  g/dL Final    BUN/Creatinine Ratio 07/19/2024 16.8  7.0 - 25.0 Final    Anion Gap 07/19/2024 11.7  5.0 - 15.0 mmol/L Final    eGFR 07/19/2024 84.5  >60.0 mL/min/1.73 Final    Hemoglobin A1C 07/19/2024 5.50  4.80 - 5.60 % Final    Total Cholesterol 07/19/2024 159  0 - 200 mg/dL Final    Triglycerides 07/19/2024 68  0 - 150 mg/dL Final    HDL Cholesterol 07/19/2024 51  40 - 60 mg/dL Final    LDL Cholesterol  07/19/2024 95  0 - 100 mg/dL Final    VLDL Cholesterol 07/19/2024 13  5 - 40 mg/dL Final    LDL/HDL Ratio 07/19/2024 1.85   Final    TSH 07/19/2024 5.620 (H)  0.270 - 4.200 uIU/mL Final   Hospital Outpatient  Visit on 04/12/2024   Component Date Value Ref Range Status    BH CV VAS BP RIGHT ARM 04/12/2024 114/87  mmHg Final    EF(MOD-bp) 04/12/2024 57.0  % Final    LVIDd 04/12/2024 4.9  cm Final    LVIDs 04/12/2024 3.2  cm Final    IVSd 04/12/2024 0.80  cm Final    LVPWd 04/12/2024 0.80  cm Final    FS 04/12/2024 34.7  % Final    IVS/LVPW 04/12/2024 1.00  cm Final    ESV(cubed) 04/12/2024 32.8  ml Final    LV Sys Vol (BSA corrected) 04/12/2024 22.3  cm2 Final    EDV(cubed) 04/12/2024 117.6  ml Final    LV Garcia Vol (BSA corrected) 04/12/2024 51.8  cm2 Final    LV mass(C)d 04/12/2024 131.2  grams Final    LVOT area 04/12/2024 3.1  cm2 Final    LVOT diam 04/12/2024 2.00  cm Final    EDV(MOD-sp2) 04/12/2024 96.6  ml Final    EDV(MOD-sp4) 04/12/2024 108.0  ml Final    ESV(MOD-sp2) 04/12/2024 41.0  ml Final    ESV(MOD-sp4) 04/12/2024 46.5  ml Final    SV(MOD-sp2) 04/12/2024 55.6  ml Final    SV(MOD-sp4) 04/12/2024 61.5  ml Final    SVi(MOD-SP2) 04/12/2024 26.6  ml/m2 Final    SVi(MOD-SP4) 04/12/2024 29.5  ml/m2 Final    EF(MOD-sp2) 04/12/2024 57.6  % Final    EF(MOD-sp4) 04/12/2024 56.9  % Final    MV E max gilmar 04/12/2024 46.3  cm/sec Final    MV A max gilmar 04/12/2024 40.8  cm/sec Final    MV dec time 04/12/2024 0.19  sec Final    MV E/A 04/12/2024 1.13   Final    LA ESV Index (BP) 04/12/2024 22.8  ml/m2 Final    Med Peak E' Gilmar 04/12/2024 10.8  cm/sec Final    Lat Peak E' Gilmar 04/12/2024 13.1  cm/sec Final    Avg E/e' ratio 04/12/2024 3.87   Final    SV(LVOT) 04/12/2024 49.6  ml Final    RV Base 04/12/2024 2.8  cm Final    RV Mid 04/12/2024 2.20  cm Final    RV Length 04/12/2024 7.7  cm Final    TAPSE (>1.6) 04/12/2024 2.9  cm Final    RV S' 04/12/2024 11.5  cm/sec Final    LA dimension (2D)  04/12/2024 3.3  cm Final    LV V1 max 04/12/2024 88.7  cm/sec Final    LV V1 max PG 04/12/2024 3.1  mmHg Final    LV V1 mean PG 04/12/2024 2.00  mmHg Final    LV V1 VTI 04/12/2024 15.8  cm Final    Ao pk gilmar 04/12/2024 125.0  cm/sec Final     Ao max PG 04/12/2024 6.3  mmHg Final    Ao mean PG 04/12/2024 3.0  mmHg Final    Ao V2 VTI 04/12/2024 22.6  cm Final    CANDIE(I,D) 04/12/2024 2.20  cm2 Final    MV max PG 04/12/2024 1.29  mmHg Final    MV mean PG 04/12/2024 1.00  mmHg Final    MV V2 VTI 04/12/2024 16.8  cm Final    MV P1/2t 04/12/2024 67.5  msec Final    MVA(P1/2t) 04/12/2024 3.3  cm2 Final    MVA(VTI) 04/12/2024 3.0  cm2 Final    MV dec slope 04/12/2024 268.0  cm/sec2 Final    PA acc time 04/12/2024 0.14  sec Final    Ao root diam 04/12/2024 3.7  cm Final         Assessment & Plan   Problems Addressed this Visit          Mental Health    Attention deficit hyperactivity disorder (ADHD)    Relevant Medications    amphetamine-dextroamphetamine XR (Adderall XR) 15 MG 24 hr capsule    busPIRone (BUSPAR) 10 MG tablet     Other Visit Diagnoses       Bipolar affective disorder, mixed    -  Primary    Relevant Medications    amphetamine-dextroamphetamine XR (Adderall XR) 15 MG 24 hr capsule    busPIRone (BUSPAR) 10 MG tablet    Anxiety        Relevant Medications    busPIRone (BUSPAR) 10 MG tablet    Concentration deficit        Medication management        Relevant Medications    amphetamine-dextroamphetamine XR (Adderall XR) 15 MG 24 hr capsule    busPIRone (BUSPAR) 10 MG tablet          Diagnoses         Codes Comments    Bipolar affective disorder, mixed    -  Primary ICD-10-CM: F31.60  ICD-9-CM: 296.60     Anxiety     ICD-10-CM: F41.9  ICD-9-CM: 300.00     Attention deficit hyperactivity disorder (ADHD), combined type     ICD-10-CM: F90.2  ICD-9-CM: 314.01     Concentration deficit     ICD-10-CM: R41.840  ICD-9-CM: 799.51     Medication management     ICD-10-CM: Z79.899  ICD-9-CM: V58.69             Visit Diagnoses:    ICD-10-CM ICD-9-CM   1. Bipolar affective disorder, mixed  F31.60 296.60   2. Anxiety  F41.9 300.00   3. Attention deficit hyperactivity disorder (ADHD), combined type  F90.2 314.01   4. Concentration deficit  R41.840 799.51   5.  Medication management  Z79.899 V58.69           Continue Prozac, Buspar, Lamictal, Adderall.  Refills provided.  Previously educated upon side effects with use of these medications as well as when to present for emergency services, denies at this time. Juan David reviewed and appropriate per her report number [id number].  Follow up with this provider in 4 weeks or sooner if needed.    The patient is being prescribed a controlled substance as part of the treatment plan. The patient has been educated of appropriate use of the medication(s), including risks and side effects such as insomnia, headache, exacerbation of tics, nervousness, irritability, overstimulation, tremor, dizziness, anorexia or change in appetite, nausea, dry mouth, constipation, diarrhea, weight loss, sexual dysfunction, psychotic episodes, seizures, palpitations, tachycardia, hypertension, rare activation (activation of hypomania, lesa, and/or suicidal ideations), cardiovascular adverse effects including sudden death (especially patients with pre-existing structural abnormalities often associated with a family history of cardiac disease), may slow normal growth in children, weight gain is reported but not expected, sedation is possible but activation is much more common, metabolic adversities, the risk of tolerance and dependence, and overdose among others. Without the prescribed medication for ADHD, it is reported that the patient has problems with attention and focus including being easily distracted, easily losing objects, trouble with time management, trouble completing tasks because of distractions, procrastination, indecisiveness, careless mistakes in daily activities/work, and not finishing jobs that are started. The goals and objectives with treatment have been discussed including management of reported symptoms of ADHD, to minimize the impact of ADHD symptoms on patient function while maximizing the patient's ability to compensate or cope with  any remaining difficulties, and to assist the patient with being successful and productive in their life/daily pursuits. The patient denies any side effects, no cardiovascular symptoms including palpitations or hypertension, no development or worsening of insomnia, and no development or worsening of anxiety symptoms on the medication. Due to the reported problems without the medication usage, as well as the reported significant improvement in symptoms with the medication usage, the patient requests to remain on the prescribed medication for ADHD and does not feel tapering or coming off of medication at this time is appropriate. The reported symptoms of ADHD, any reported resolution of symptoms, and the necessity and appropriateness for continued treatment with a controlled substance will be reevaluated at each encounter. Patient is also informed that the medication is to be used by the patient only, the medication is to be used only as directed, and the medication should not be combined with other substances, OTC or prescription, unless directed by a Provider/Prescriber. The patient verbalized understanding and agreement with this in their own words, and wish to continue with the current treatment plan.       GOALS:  Short Term Goals: Patient will be compliant with medication, and patient will have no significant medication related side effects.  Patient will be engaged in psychotherapy as indicated.  Patient will report subjective improvement of symptoms.  Long term goals: To stabilize mood and treat/improve subjective symptoms, the patient will stay out of the hospital, the patient will be at an optimal level of functioning, and the patient will take all medications as prescribed.  The patient/guardian verbalized understanding and agreement with goals that were mutually set.      TREATMENT PLAN: Continue supportive psychotherapy efforts and medications as indicated.  Pharmacological and Non-Pharmacological  treatment options discussed during today's visit. Patient/Guardian acknowledged and verbally consented with current treatment plan and was educated on the importance of compliance with treatment and follow-up appointments.      MEDICATION ISSUES:  Discussed medication options and treatment plan of prescribed medication as well as the risks, benefits, any black box warnings, and side effects including potential falls, possible impaired driving, and metabolic adversities among others. Patient is agreeable to call the office with any worsening of symptoms or onset of side effects, or if any concerns or questions arise.  The contact information for the office is made available to the patient. Patient is agreeable to call 911 or go to the nearest ER should they begin having any SI/HI, or if any urgent concerns arise. No medication side effects or related complaints today.     MEDS ORDERED DURING VISIT:  New Medications Ordered This Visit   Medications    amphetamine-dextroamphetamine XR (Adderall XR) 15 MG 24 hr capsule     Sig: Take 1 capsule by mouth Daily     Dispense:  30 capsule     Refill:  0     No early fills    busPIRone (BUSPAR) 10 MG tablet     Sig: Take 1 tablet by mouth daily.     Dispense:  90 tablet     Refill:  0       Follow Up Appointment:   Return in about 4 weeks (around 10/10/2024) for Recheck.             This document has been electronically signed by DEVEN Murillo  September 12, 2024 15:54 EDT    Some of the data in this electronic note has been brought forward from a previous encounter, any necessary changes have been made, it has been reviewed by this APRN, and it is accurate.    Dictated Utilizing Dragon Dictation: Part of this note may be an electronic transcription/translation of spoken language to printed text using the Dragon Dictation System.

## 2024-10-09 ENCOUNTER — TELEMEDICINE (OUTPATIENT)
Dept: PSYCHIATRY | Facility: CLINIC | Age: 49
End: 2024-10-09
Payer: COMMERCIAL

## 2024-10-09 DIAGNOSIS — Z79.899 MEDICATION MANAGEMENT: ICD-10-CM

## 2024-10-09 DIAGNOSIS — F33.8 SEASONAL DEPRESSION: ICD-10-CM

## 2024-10-09 DIAGNOSIS — F31.60 BIPOLAR AFFECTIVE DISORDER, MIXED: ICD-10-CM

## 2024-10-09 DIAGNOSIS — F41.9 ANXIETY: ICD-10-CM

## 2024-10-09 DIAGNOSIS — F90.2 ATTENTION DEFICIT HYPERACTIVITY DISORDER (ADHD), COMBINED TYPE: Primary | ICD-10-CM

## 2024-10-09 RX ORDER — DEXTROAMPHETAMINE SACCHARATE, AMPHETAMINE ASPARTATE MONOHYDRATE, DEXTROAMPHETAMINE SULFATE AND AMPHETAMINE SULFATE 3.75; 3.75; 3.75; 3.75 MG/1; MG/1; MG/1; MG/1
15 CAPSULE, EXTENDED RELEASE ORAL DAILY
Qty: 30 CAPSULE | Refills: 0 | Status: SHIPPED | OUTPATIENT
Start: 2024-10-09

## 2024-10-09 NOTE — TREATMENT PLAN
Multi-Disciplinary Problems (from Behavioral Health Treatment Plan)      Active Problems       Problem: Anxiety  Start Date: 10/09/24      Problem Details: The patient self-scales this problem as a 4 with 10 being the worst.          Goal Priority Start Date Expected End Date End Date    Patient will develop and implement behavioral and cognitive strategies to reduce anxiety and irrational fears. High 10/09/24 04/09/25 --    Goal Details: Progress toward goal:  The patient self-scales their progress related to this goal as a 3 with 10 being the worst.          Goal Intervention Frequency Start Date End Date    Help patient explore past emotional issues in relation to present anxiety. PRN 10/09/24 --    Intervention Details: Duration of treatment until discharged.          Goal Intervention Frequency Start Date End Date    Help patient develop an awareness of their cognitive and physical responses to anxiety. PRN 10/09/24 --    Intervention Details: Duration of treatment until discharged.                  Problem: Depression  Start Date: 10/09/24      Problem Details: The patient self-scales this problem as a 1 with 10 being the worst.          Goal Priority Start Date Expected End Date End Date    Patient will demonstrate the ability to initiate new constructive life skills outside of sessions on a consistent basis. High 10/09/24 04/09/25 --    Goal Details: Progress toward goal:  The patient self-scales their progress related to this goal as a 1 with 10 being the worst.          Goal Intervention Frequency Start Date End Date    Assist patient in setting attainable activities of daily living goals. PRN 10/09/24 --      Goal Intervention Frequency Start Date End Date    Provide education about depression PRN 10/09/24 --    Intervention Details: Duration of treatment until discharged.          Goal Intervention Frequency Start Date End Date    Assist patient in developing healthy coping strategies. PRN 10/09/24 --     Intervention Details: Duration of treatment until discharged.                  Problem: Mood Disorder  Start Date: 10/09/24      Problem Details: The patient self-scales this problem as a 1 with 10 being the worst.        Goal Priority Start Date Expected End Date End Date    Decrease impulsivity in action- that is, not engaging in self-destructive behavious such as overspending, promiscuity, substance abuse, or use of profane language. High 10/09/24 04/09/25 --    Goal Details: Progress toward goal:  The patient self-scales their progress related to this goal as a 1 with 10 being the worst.        Goal Intervention Frequency Start Date End Date    Provide structure and focus to patients thoughts and action by establishing plans and routine. PRN 10/09/24 --    Intervention Details: Duration of treatment until discharged.        Goal Intervention Frequency Start Date End Date    Assist patient in setting reasonable goals and limits on behavior. PRN 10/09/24 --    Intervention Details: Duration of treatment until discharged.                Problem: ADHD (Adult)  Start Date: 10/09/24      Problem Details: The patient self-scales this problem as a 1 with 10 being the worst.        Goal Priority Start Date Expected End Date End Date    Patient will sustain attention and concentration to complete chores, and work responsibilites and increase positive interaction in all relationships. High 10/09/24 04/09/25 --    Goal Details: Progress toward goal:  The patient self-scales their progress related to this goal as a 1 with 10 being the worst.        Goal Intervention Frequency Start Date End Date    Assist patient in setting responsible goals and breaking down large tasks. PRN 10/09/24 --    Intervention Details: Duration of treatment until discharged.        Goal Intervention Frequency Start Date End Date    Assist patient in using self monitoring checklist to improve attention, work performance, and social skills. PRN  10/09/24 --    Intervention Details: Duration of treatment until discharged.                               I have discussed and reviewed this treatment plan with the patient and/or guardian.  The patient has verbally agreed with this treatment plan (no signatures are obtained at today's visit as the patient is a telehealth patient and is unable to print and sign this document, therefore verbal agreement is obtained).

## 2024-10-09 NOTE — PROGRESS NOTES
"This provider is located at the [facility] (through [facility]), [address], [facility], [address] using a secure video visit through [employer]. Patient is being seen remotely via telehealth at their home address in [place], and stated they are in a secure environment for this session.   The patient's condition being consulted/diagnosed/treated is appropriate for telemedicine. The provider identified herself as well as her credentials.   The patient, and/or patients guardian, consent to be seen remotely, and when consent is given they understand that the consent allows for patient identifiable information to be sent to a third party as needed. They may refuse to be seen remotely at any time. The electronic data is encrypted and password protected, and the patient and/or guardian has been advised of the potential risks to privacy not withstanding such measures.   The use of a video visit has been reviewed with the patient and verbal informed consent has been obtained.   Patient identifiers used: Name and .    Subjective   Ronaldo Cantu is a 48 y.o. male who presents today for follow up    Chief Complaint:    Chief Complaint   Patient presents with    Anxiety    Depression    Med Management    ADHD        History of Present Illness:   History of Present Illness  Patient is a 48-year-old male presenting for 1 month follow-up related to depression, anxiety, irritability and ADHD.  Voices compliance with medications, regarding side effects states he discontinued BuSpar approximately 2 weeks ago due to headaches.  Also has been feeling some fatigue over the past few weeks.  This has not improved since stopping BuSpar.  States he does not feel depressed, believes both depression and anxiety are under control.  PHQ increased from 0-5, indicating mild depression which patient states \"no depression.\"  Regarding use of Adderall in addressing restlessness and concentration, he " "voices efficacy and states he feels this medication is doing all he needs to do for these symptoms.  JACQUELINE increase from 0-1, minimal for anxiety which patient states generally is fine but \"has been pretty high because of disability.\"  Recently found out disability was denied, is going through with the processes related to this.  States he is feeling better about situation now \"anxiety is back to what I consider is normal.\"  Sleeping adequately, fatigue is largest concern today.  However, he states \"I get up quite a few times and I go back to sleep\" due to \"the dogs.\"  He states \"I have been so tired I cannot hold my eyes open over the last 2 weeks.\"  States his mood is \"good.\"  States \"I have been eating at time but not gaining weight.\"  Regarding seasonal depression, he denies feeling currently and is hopeful he will not with current medication regimen on board.  He states \"I have lived a life I have never had before mentally\" and voices positive changes in mental health with use of current medication regimen.  Medically he has a dentist appointment today, also having physical therapy performed on shoulder.  TSH recently high, patient to reach out with provider regarding this.    Patient resides in a home with his wife of 8 years whom he cites as supportive.  He has 1 biological child and 2 stepchildren, all adults.  He has a college degree in biology and is now retired.  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  This has been stressful.  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  [employer] preference.  Enjoys golfing.  Denies drug or alcohol use.  Some stress surrounding disability case.      The following portions of the patient's history were reviewed and updated as appropriate: allergies, current medications, past family history, past medical history, past social history, past surgical history and problem list.    Past Psychiatric History:  Began Treatment: 7 years ago for anger " management  Diagnoses: unsure  Psychiatrist:Denies  Therapist: previously  Admission History:Denies  Medication Trials: effexor straterra (more irritable) and quelbree( suicidal), clonidine (increased thirst), wellbutrin (made worse), vyvanse (side effects-jaw clenching), adderall 7.5 BID (wore off), buspar 10 (headaches/sedating)  Self Harm: Denies  Suicide Attempts:Denies   Psychosis, Anxiety, Depression:  N/A    Past Medical History:  Past Medical History:   Diagnosis Date    Nephrolithiasis        Substance Abuse History:   Types:Denies all, including illicit  Withdrawal Symptoms:Denies  Longest Period Sober:Not Applicable   AA: Not applicable     Social History:  Social History     Socioeconomic History    Marital status:     Number of children: 3   Tobacco Use    Smoking status: Never    Smokeless tobacco: Never   Vaping Use    Vaping status: Never Used   Substance and Sexual Activity    Alcohol use: Yes     Alcohol/week: 1.0 standard drink of alcohol     Types: 1 Cans of beer per week     Comment: 1 per day    Drug use: Never    Sexual activity: Defer       Family History:  Family History   Problem Relation Age of Onset    Heart attack Mother     Suicide Attempts Sister     Cancer Maternal Grandmother     Lung cancer Paternal Grandfather        Past Surgical History:  Past Surgical History:   Procedure Laterality Date    APPENDECTOMY  2010    CARDIAC ABLATION  2019    CERVICAL FUSION  10/2012    KNEE ARTHROPLASTY, PARTIAL REPLACEMENT  2022    KNEE ARTHROSCOPY  2021    SHOULDER ARTHROSCOPY         Problem List:  Patient Active Problem List   Diagnosis    Paroxysmal atrial fibrillation    Mixed hyperlipidemia    Primary osteoarthritis of both knees    Degeneration of lumbar or lumbosacral intervertebral disc    Toenail fungus    Bipolar affective disorder    Depression    Attention deficit hyperactivity disorder (ADHD)       Allergy:   Allergies   Allergen Reactions    Viloxazine Hcl  Er Mental Status Change     Suicidal thoughts        Current Medications:   Current Outpatient Medications   Medication Sig Dispense Refill    amphetamine-dextroamphetamine XR (Adderall XR) 15 MG 24 hr capsule Take 1 capsule by mouth Daily 30 capsule 0    atorvastatin (LIPITOR) 20 MG tablet TAKE 1 TABLET BY MOUTH EVERY DAY IN THE EVENING 90 tablet 3    FLUoxetine (PROzac) 40 MG capsule Take 1 capsule by mouth Daily. 90 capsule 0    lamoTRIgine  MG tablet sustained-release 24 hour TAKE 1 TABLET BY MOUTH EVERY DAY 90 tablet 0    tiZANidine (ZANAFLEX) 4 MG tablet Take 1 tablet by mouth Every 8 (Eight) Hours As Needed for Muscle Spasms. 45 tablet 5     No current facility-administered medications for this visit.       Review of Systems:    Review of Systems   Constitutional:  Positive for fatigue.   HENT: Negative.     Eyes: Negative.    Respiratory: Negative.     Cardiovascular: Negative.    Gastrointestinal: Negative.    Endocrine: Negative.    Genitourinary: Negative.         Kidney stone   Musculoskeletal:  Positive for back pain.   Skin: Negative.    Allergic/Immunologic: Negative.    Neurological: Negative.  Negative for seizures.   Hematological: Negative.    Psychiatric/Behavioral: Negative.  Positive for stress.          Physical Exam:   Physical Exam  Constitutional:       Appearance: Normal appearance. He is normal weight.   HENT:      Head: Normocephalic.      Nose: Nose normal.   Pulmonary:      Effort: Pulmonary effort is normal.   Musculoskeletal:         General: Normal range of motion.      Cervical back: Normal range of motion.   Neurological:      General: No focal deficit present.      Mental Status: He is alert and oriented to person, place, and time. Mental status is at baseline.   Psychiatric:         Attention and Perception: Attention and perception normal.         Mood and Affect: Mood and affect normal. Mood is anxious.         Speech: Speech normal.         Behavior: Behavior normal.  Behavior is cooperative.         Thought Content: Thought content normal.         Cognition and Memory: Cognition and memory normal.         Judgment: Judgment normal.     Vitals:  There were no vitals taken for this visit. There is no height or weight on file to calculate BMI.  Due to extenuating circumstances and possible current health risks associated with the patient being present in a clinical setting (with current health restrictions in place in regards to possible COVID 19 transmission/exposure), the patient was seen remotely today via a MyChart Video [facility] through [facility].  Unable to obtain vital signs due to nature of remote visit.  Height stated at 6ft1 inches.  Weight stated at 183 pounds.    Last 3 Blood Pressure Readings:  BP Readings from Last 3 Encounters:   07/19/24 110/70   04/12/24 108/70   01/20/24 120/68     PHQ-9 Depression Screening  Little interest or pleasure in doing things? 0-->not at all   Feeling down, depressed, or hopeless? 0-->not at all   Trouble falling or staying asleep, or sleeping too much? 1-->several days   Feeling tired or having little energy? 2-->more than half the days   Poor appetite or overeating? 2-->more than half the days   Feeling bad about yourself - or that you are a failure or have let yourself or your family down? 0-->not at all   Trouble concentrating on things, such as reading the newspaper or watching television? 0-->not at all   Moving or speaking so slowly that other people could have noticed? Or the opposite - being so fidgety or restless that you have been moving around a lot more than usual? 0-->not at all   Thoughts that you would be better off dead, or of hurting yourself in some way? 0-->not at all   PHQ-9 Total Score 5   If you checked off any problems, how difficult have these problems made it for you to do your work, take care of things at home, or get along with other people? somewhat difficult     JACQUELINE: 1    Mental Status Exam:   Hygiene:    good  Cooperation:  Cooperative  Eye Contact:  Good  Psychomotor Behavior:  Appropriate  Affect:  Appropriate  Mood: normal  Hopelessness: Denies  Speech:  Rapid  Thought Process:  Goal directed  Thought Content:  Normal  Suicidal:  None  Homicidal:  None  Hallucinations:  None  Delusion:  None  Memory:  Intact  Orientation:  Grossly intact  Reliability:  good  Insight:  Good  Judgement:  Fair  Impulse Control:  Fair  Physical/Medical Issues:   back surgeries, knee surgeries, cardiac ablation 2019        Lab Results:   Lab on 08/28/2024   Component Date Value Ref Range Status    TSH 08/28/2024 4.670 (H)  0.270 - 4.200 uIU/mL Final   Office Visit on 07/19/2024   Component Date Value Ref Range Status    WBC 07/19/2024 7.21  3.40 - 10.80 10*3/mm3 Final    RBC 07/19/2024 4.91  4.14 - 5.80 10*6/mm3 Final    Hemoglobin 07/19/2024 14.3  13.0 - 17.7 g/dL Final    Hematocrit 07/19/2024 44.0  37.5 - 51.0 % Final    MCV 07/19/2024 89.6  79.0 - 97.0 fL Final    MCH 07/19/2024 29.1  26.6 - 33.0 pg Final    MCHC 07/19/2024 32.5  31.5 - 35.7 g/dL Final    RDW 07/19/2024 13.5  12.3 - 15.4 % Final    RDW-SD 07/19/2024 44.1  37.0 - 54.0 fl Final    MPV 07/19/2024 11.3  6.0 - 12.0 fL Final    Platelets 07/19/2024 172  140 - 450 10*3/mm3 Final    Glucose 07/19/2024 87  65 - 99 mg/dL Final    BUN 07/19/2024 18  6 - 20 mg/dL Final    Creatinine 07/19/2024 1.07  0.76 - 1.27 mg/dL Final    Sodium 07/19/2024 138  136 - 145 mmol/L Final    Potassium 07/19/2024 4.5  3.5 - 5.2 mmol/L Final    Chloride 07/19/2024 102  98 - 107 mmol/L Final    CO2 07/19/2024 24.3  22.0 - 29.0 mmol/L Final    Calcium 07/19/2024 9.2  8.6 - 10.5 mg/dL Final    Total Protein 07/19/2024 7.2  6.0 - 8.5 g/dL Final    Albumin 07/19/2024 4.4  3.5 - 5.2 g/dL Final    ALT (SGPT) 07/19/2024 33  1 - 41 U/L Final    AST (SGOT) 07/19/2024 34  1 - 40 U/L Final    Alkaline Phosphatase 07/19/2024 87  39 - 117 U/L Final    Total Bilirubin 07/19/2024 0.4  0.0 - 1.2 mg/dL Final     Globulin 07/19/2024 2.8  gm/dL Final    A/G Ratio 07/19/2024 1.6  g/dL Final    BUN/Creatinine Ratio 07/19/2024 16.8  7.0 - 25.0 Final    Anion Gap 07/19/2024 11.7  5.0 - 15.0 mmol/L Final    eGFR 07/19/2024 84.5  >60.0 mL/min/1.73 Final    Hemoglobin A1C 07/19/2024 5.50  4.80 - 5.60 % Final    Total Cholesterol 07/19/2024 159  0 - 200 mg/dL Final    Triglycerides 07/19/2024 68  0 - 150 mg/dL Final    HDL Cholesterol 07/19/2024 51  40 - 60 mg/dL Final    LDL Cholesterol  07/19/2024 95  0 - 100 mg/dL Final    VLDL Cholesterol 07/19/2024 13  5 - 40 mg/dL Final    LDL/HDL Ratio 07/19/2024 1.85   Final    TSH 07/19/2024 5.620 (H)  0.270 - 4.200 uIU/mL Final   Hospital Outpatient Visit on 04/12/2024   Component Date Value Ref Range Status    BH CV VAS BP RIGHT ARM 04/12/2024 114/87  mmHg Final    EF(MOD-bp) 04/12/2024 57.0  % Final    LVIDd 04/12/2024 4.9  cm Final    LVIDs 04/12/2024 3.2  cm Final    IVSd 04/12/2024 0.80  cm Final    LVPWd 04/12/2024 0.80  cm Final    FS 04/12/2024 34.7  % Final    IVS/LVPW 04/12/2024 1.00  cm Final    ESV(cubed) 04/12/2024 32.8  ml Final    LV Sys Vol (BSA corrected) 04/12/2024 22.3  cm2 Final    EDV(cubed) 04/12/2024 117.6  ml Final    LV Garcia Vol (BSA corrected) 04/12/2024 51.8  cm2 Final    LV mass(C)d 04/12/2024 131.2  grams Final    LVOT area 04/12/2024 3.1  cm2 Final    LVOT diam 04/12/2024 2.00  cm Final    EDV(MOD-sp2) 04/12/2024 96.6  ml Final    EDV(MOD-sp4) 04/12/2024 108.0  ml Final    ESV(MOD-sp2) 04/12/2024 41.0  ml Final    ESV(MOD-sp4) 04/12/2024 46.5  ml Final    SV(MOD-sp2) 04/12/2024 55.6  ml Final    SV(MOD-sp4) 04/12/2024 61.5  ml Final    SVi(MOD-SP2) 04/12/2024 26.6  ml/m2 Final    SVi(MOD-SP4) 04/12/2024 29.5  ml/m2 Final    EF(MOD-sp2) 04/12/2024 57.6  % Final    EF(MOD-sp4) 04/12/2024 56.9  % Final    MV E max kim 04/12/2024 46.3  cm/sec Final    MV A max kim 04/12/2024 40.8  cm/sec Final    MV dec time 04/12/2024 0.19  sec Final    MV E/A 04/12/2024 1.13    Final    LA ESV Index (BP) 04/12/2024 22.8  ml/m2 Final    Med Peak E' [place] 04/12/2024 10.8  cm/sec Final    Lat Peak E' [place]mar 04/12/2024 13.1  cm/sec Final    Avg E/e' ratio 04/12/2024 3.87   Final    SV(LVOT) 04/12/2024 49.6  ml Final    RV Base 04/12/2024 2.8  cm Final    RV Mid 04/12/2024 2.20  cm Final    RV Length 04/12/2024 7.7  cm Final    TAPSE (>1.6) 04/12/2024 2.9  cm Final    RV S' 04/12/2024 11.5  cm/sec Final    LA dimension (2D)  04/12/2024 3.3  cm Final    LV V1 max 04/12/2024 88.7  cm/sec Final    LV V1 max PG 04/12/2024 3.1  mmHg Final    LV V1 mean PG 04/12/2024 2.00  mmHg Final    LV V1 VTI 04/12/2024 15.8  cm Final    Ao pk gilmar 04/12/2024 125.0  cm/sec Final    Ao max PG 04/12/2024 6.3  mmHg Final    Ao mean PG 04/12/2024 3.0  mmHg Final    Ao V2 VTI 04/12/2024 22.6  cm Final    CANDIE(I,D) 04/12/2024 2.20  cm2 Final    MV max PG 04/12/2024 1.29  mmHg Final    MV mean PG 04/12/2024 1.00  mmHg Final    MV V2 VTI 04/12/2024 16.8  cm Final    MV P1/2t 04/12/2024 67.5  msec Final    MVA(P1/2t) 04/12/2024 3.3  cm2 Final    MVA(VTI) 04/12/2024 3.0  cm2 Final    MV dec slope 04/12/2024 268.0  cm/sec2 Final    PA acc time 04/12/2024 0.14  sec Final    Ao root diam 04/12/2024 3.7  cm Final         Assessment & Plan   Problems Addressed this Visit          Mental Health    Attention deficit hyperactivity disorder (ADHD) - Primary    Relevant Medications    amphetamine-dextroamphetamine XR (Adderall XR) 15 MG 24 hr capsule     Other Visit Diagnoses       Bipolar affective disorder, mixed        Relevant Medications    amphetamine-dextroamphetamine XR (Adderall XR) 15 MG 24 hr capsule    Anxiety        Seasonal depression        Relevant Medications    amphetamine-dextroamphetamine XR (Adderall XR) 15 MG 24 hr capsule    Medication management        Relevant Medications    amphetamine-dextroamphetamine XR (Adderall XR) 15 MG 24 hr capsule    Other Relevant Orders    Urine Drug Screen - Urine, Clean Catch           Diagnoses         Codes Comments    Attention deficit hyperactivity disorder (ADHD), combined type    -  Primary ICD-10-CM: F90.2  ICD-9-CM: 314.01     Bipolar affective disorder, mixed     ICD-10-CM: F31.60  ICD-9-CM: 296.60     Anxiety     ICD-10-CM: F41.9  ICD-9-CM: 300.00     Seasonal depression     ICD-10-CM: F33.8  ICD-9-CM: 296.99     Medication management     ICD-10-CM: Z79.899  ICD-9-CM: V58.69             Visit Diagnoses:    ICD-10-CM ICD-9-CM   1. Attention deficit hyperactivity disorder (ADHD), combined type  F90.2 314.01   2. Bipolar affective disorder, mixed  F31.60 296.60   3. Anxiety  F41.9 300.00   4. Seasonal depression  F33.8 296.99   5. Medication management  Z79.899 V58.69     UDS ordered, yearly due.  BuSpar discontinued due to side effects.  Juan David reviewed and appropriate per report #[id number].  Prozac and Lamictal refilled.  Patient voices stability with current medications and thus no changes made today. Previously educated upon side effects with use of these medications as well as when to present for emergency services, denies at this time.  Discussed need to reach out to PCP related to fatigue.  Follow up with this provider in 4 to 5 weeks or sooner if needed.    The patient is being prescribed a controlled substance as part of the treatment plan. The patient has been educated of appropriate use of the medication(s), including risks and side effects such as insomnia, headache, exacerbation of tics, nervousness, irritability, overstimulation, tremor, dizziness, anorexia or change in appetite, nausea, dry mouth, constipation, diarrhea, weight loss, sexual dysfunction, psychotic episodes, seizures, palpitations, tachycardia, hypertension, rare activation (activation of hypomania, lesa, and/or suicidal ideations), cardiovascular adverse effects including sudden death (especially patients with pre-existing structural abnormalities often associated with a family history of cardiac  disease), may slow normal growth in children, weight gain is reported but not expected, sedation is possible but activation is much more common, metabolic adversities, the risk of tolerance and dependence, and overdose among others. Without the prescribed medication for ADHD, it is reported that the patient has problems with attention and focus including being easily distracted, easily losing objects, trouble with time management, trouble completing tasks because of distractions, procrastination, indecisiveness, careless mistakes in daily activities/work, and not finishing jobs that are started. The goals and objectives with treatment have been discussed including management of reported symptoms of ADHD, to minimize the impact of ADHD symptoms on patient function while maximizing the patient's ability to compensate or cope with any remaining difficulties, and to assist the patient with being successful and productive in their life/daily pursuits. The patient denies any side effects, no cardiovascular symptoms including palpitations or hypertension, no development or worsening of insomnia, and no development or worsening of anxiety symptoms on the medication. Due to the reported problems without the medication usage, as well as the reported significant improvement in symptoms with the medication usage, the patient requests to remain on the prescribed medication for ADHD and does not feel tapering or coming off of medication at this time is appropriate. The reported symptoms of ADHD, any reported resolution of symptoms, and the necessity and appropriateness for continued treatment with a controlled substance will be reevaluated at each encounter. Patient is also informed that the medication is to be used by the patient only, the medication is to be used only as directed, and the medication should not be combined with other substances, OTC or prescription, unless directed by a Provider/Prescriber. The patient  verbalized understanding and agreement with this in their own words, and wish to continue with the current treatment plan.       GOALS:  Short Term Goals: Patient will be compliant with medication, and patient will have no significant medication related side effects.  Patient will be engaged in psychotherapy as indicated.  Patient will report subjective improvement of symptoms.  Long term goals: To stabilize mood and treat/improve subjective symptoms, the patient will stay out of the hospital, the patient will be at an optimal level of functioning, and the patient will take all medications as prescribed.  The patient/guardian verbalized understanding and agreement with goals that were mutually set.      TREATMENT PLAN: Continue supportive psychotherapy efforts and medications as indicated.  Pharmacological and Non-Pharmacological treatment options discussed during today's visit. Patient/Guardian acknowledged and verbally consented with current treatment plan and was educated on the importance of compliance with treatment and follow-up appointments.      MEDICATION ISSUES:  Discussed medication options and treatment plan of prescribed medication as well as the risks, benefits, any black box warnings, and side effects including potential falls, possible impaired driving, and metabolic adversities among others. Patient is agreeable to call the office with any worsening of symptoms or onset of side effects, or if any concerns or questions arise.  The contact information for the office is made available to the patient. Patient is agreeable to call 911 or go to the nearest ER should they begin having any SI/HI, or if any urgent concerns arise. No medication side effects or related complaints today.     MEDS ORDERED DURING VISIT:  New Medications Ordered This Visit   Medications    amphetamine-dextroamphetamine XR (Adderall XR) 15 MG 24 hr capsule     Sig: Take 1 capsule by mouth Daily     Dispense:  30 capsule     Refill:   0     No early fills       Follow Up Appointment:   Return in about 4 weeks (around 11/6/2024) for Recheck.             This document has been electronically signed by DEVEN Murillo  October 9, 2024 12:05 EDT    Some of the data in this electronic note has been brought forward from a previous encounter, any necessary changes have been made, it has been reviewed by this APRN, and it is accurate.    Dictated Utilizing Dragon Dictation: Part of this note may be an electronic transcription/translation of spoken language to printed text using the Dragon Dictation System.

## 2024-10-11 ENCOUNTER — LAB (OUTPATIENT)
Dept: INTERNAL MEDICINE | Facility: CLINIC | Age: 49
End: 2024-10-11
Payer: COMMERCIAL

## 2024-10-11 DIAGNOSIS — Z79.899 MEDICATION MANAGEMENT: ICD-10-CM

## 2024-10-11 LAB
AMPHET+METHAMPHET UR QL: POSITIVE
AMPHETAMINES UR QL: NEGATIVE
BARBITURATES UR QL SCN: NEGATIVE
BENZODIAZ UR QL SCN: NEGATIVE
BUPRENORPHINE SERPL-MCNC: NEGATIVE NG/ML
CANNABINOIDS SERPL QL: NEGATIVE
COCAINE UR QL: NEGATIVE
FENTANYL UR-MCNC: NEGATIVE NG/ML
METHADONE UR QL SCN: NEGATIVE
OPIATES UR QL: NEGATIVE
OXYCODONE UR QL SCN: NEGATIVE
PCP UR QL SCN: NEGATIVE
TRICYCLICS UR QL SCN: NEGATIVE

## 2024-10-11 PROCEDURE — 80307 DRUG TEST PRSMV CHEM ANLYZR: CPT

## 2024-10-15 DIAGNOSIS — M51.379 DEGENERATION OF LUMBAR OR LUMBOSACRAL INTERVERTEBRAL DISC: ICD-10-CM

## 2024-10-17 ENCOUNTER — OFFICE VISIT (OUTPATIENT)
Dept: INTERNAL MEDICINE | Facility: CLINIC | Age: 49
End: 2024-10-17
Payer: COMMERCIAL

## 2024-10-17 ENCOUNTER — LAB (OUTPATIENT)
Dept: INTERNAL MEDICINE | Facility: CLINIC | Age: 49
End: 2024-10-17
Payer: COMMERCIAL

## 2024-10-17 VITALS
HEART RATE: 78 BPM | DIASTOLIC BLOOD PRESSURE: 70 MMHG | WEIGHT: 186 LBS | SYSTOLIC BLOOD PRESSURE: 108 MMHG | OXYGEN SATURATION: 98 % | BODY MASS INDEX: 24.65 KG/M2 | HEIGHT: 73 IN

## 2024-10-17 DIAGNOSIS — F90.0 ATTENTION DEFICIT HYPERACTIVITY DISORDER (ADHD), PREDOMINANTLY INATTENTIVE TYPE: ICD-10-CM

## 2024-10-17 DIAGNOSIS — E78.2 MIXED HYPERLIPIDEMIA: ICD-10-CM

## 2024-10-17 DIAGNOSIS — F31.75 BIPOLAR DISORDER, IN PARTIAL REMISSION, MOST RECENT EPISODE DEPRESSED: ICD-10-CM

## 2024-10-17 DIAGNOSIS — F33.42 RECURRENT MAJOR DEPRESSIVE DISORDER, IN FULL REMISSION: ICD-10-CM

## 2024-10-17 DIAGNOSIS — I48.0 PAROXYSMAL ATRIAL FIBRILLATION: Primary | ICD-10-CM

## 2024-10-17 DIAGNOSIS — M17.0 PRIMARY OSTEOARTHRITIS OF BOTH KNEES: ICD-10-CM

## 2024-10-17 LAB
ANION GAP SERPL CALCULATED.3IONS-SCNC: 6.2 MMOL/L (ref 5–15)
BUN SERPL-MCNC: 16 MG/DL (ref 6–20)
BUN/CREAT SERPL: 13.8 (ref 7–25)
CALCIUM SPEC-SCNC: 9.8 MG/DL (ref 8.6–10.5)
CHLORIDE SERPL-SCNC: 104 MMOL/L (ref 98–107)
CO2 SERPL-SCNC: 28.8 MMOL/L (ref 22–29)
CREAT SERPL-MCNC: 1.16 MG/DL (ref 0.76–1.27)
EGFRCR SERPLBLD CKD-EPI 2021: 76.7 ML/MIN/1.73
GLUCOSE SERPL-MCNC: 88 MG/DL (ref 65–99)
POTASSIUM SERPL-SCNC: 4.3 MMOL/L (ref 3.5–5.2)
SODIUM SERPL-SCNC: 139 MMOL/L (ref 136–145)
TSH SERPL DL<=0.05 MIU/L-ACNC: 3.67 UIU/ML (ref 0.27–4.2)

## 2024-10-17 PROCEDURE — 84443 ASSAY THYROID STIM HORMONE: CPT | Performed by: INTERNAL MEDICINE

## 2024-10-17 PROCEDURE — 99214 OFFICE O/P EST MOD 30 MIN: CPT | Performed by: INTERNAL MEDICINE

## 2024-10-17 PROCEDURE — 36415 COLL VENOUS BLD VENIPUNCTURE: CPT | Performed by: INTERNAL MEDICINE

## 2024-10-17 PROCEDURE — 80048 BASIC METABOLIC PNL TOTAL CA: CPT | Performed by: INTERNAL MEDICINE

## 2024-10-17 NOTE — PROGRESS NOTES
Patient is a 48 y.o. male who is here for over eating and fatigue.  Chief Complaint   Patient presents with    Fatigue         HPI:    Here for mgmt of increased fatigue and appetite.  He is concerned his Thyroid could be the issue.  No temperature intolerance.  No ankle edema.  Sleeps well.  No GI issues.  No fever or chills.     History:     Patient Active Problem List   Diagnosis    Paroxysmal atrial fibrillation    Mixed hyperlipidemia    Primary osteoarthritis of both knees    Degeneration of lumbar or lumbosacral intervertebral disc    Toenail fungus    Bipolar affective disorder    Depression    Attention deficit hyperactivity disorder (ADHD)       Past Medical History:   Diagnosis Date    Nephrolithiasis        Past Surgical History:   Procedure Laterality Date    APPENDECTOMY  09/2010    CARDIAC ABLATION  11/2019    CERVICAL FUSION  10/2012    KNEE ARTHROPLASTY, PARTIAL REPLACEMENT  12/2022    KNEE ARTHROSCOPY  05/2021    SHOULDER ARTHROSCOPY         Current Outpatient Medications on File Prior to Visit   Medication Sig    amphetamine-dextroamphetamine XR (Adderall XR) 15 MG 24 hr capsule Take 1 capsule by mouth Daily    atorvastatin (LIPITOR) 20 MG tablet TAKE 1 TABLET BY MOUTH EVERY DAY IN THE EVENING    FLUoxetine (PROzac) 40 MG capsule Take 1 capsule by mouth Daily.    lamoTRIgine  MG tablet sustained-release 24 hour TAKE 1 TABLET BY MOUTH EVERY DAY    tiZANidine (ZANAFLEX) 4 MG tablet TAKE 1 TABLET BY MOUTH EVERY 8 HOURS AS NEEDED FOR MUSCLE SPASMS.     No current facility-administered medications on file prior to visit.       Family History   Problem Relation Age of Onset    Heart attack Mother     Suicide Attempts Sister     Cancer Maternal Grandmother     Lung cancer Paternal Grandfather        Social History     Socioeconomic History    Marital status:     Number of children: 3   Tobacco Use    Smoking status: Never    Smokeless tobacco: Never   Vaping Use    Vaping status: Never Used  "  Substance and Sexual Activity    Alcohol use: Yes     Alcohol/week: 1.0 standard drink of alcohol     Types: 1 Cans of beer per week     Comment: 1 per day    Drug use: Never    Sexual activity: Defer         Review of Systems   Constitutional:  Positive for fatigue. Negative for chills, fever and unexpected weight change.   HENT:  Negative for congestion, ear pain, hearing loss, rhinorrhea, sinus pressure, sore throat and trouble swallowing.    Eyes:  Negative for discharge and itching.   Respiratory:  Negative for cough, chest tightness and shortness of breath.    Cardiovascular:  Negative for chest pain, palpitations and leg swelling.   Gastrointestinal:  Negative for abdominal pain, blood in stool, constipation, diarrhea and vomiting.        12/21 without polyps    Endocrine: Negative for polydipsia and polyuria.   Genitourinary:  Negative for difficulty urinating, dysuria, enuresis, frequency, hematuria and urgency.        1/24 psa   Musculoskeletal:  Positive for arthralgias. Negative for back pain, gait problem and joint swelling.   Skin:  Negative for rash and wound.   Allergic/Immunologic: Negative for immunocompromised state.   Neurological:  Negative for dizziness, syncope, weakness, light-headedness and numbness.   Hematological:  Does not bruise/bleed easily.   Psychiatric/Behavioral:  Negative for behavioral problems, dysphoric mood and sleep disturbance. The patient is not nervous/anxious.        /74 (BP Location: Left arm, Patient Position: Sitting)   Pulse 78   Ht 185.4 cm (72.99\")   Wt 84.4 kg (186 lb)   SpO2 98%   BMI 24.55 kg/m²       Physical Exam  Constitutional:       Appearance: Normal appearance. He is well-developed.   HENT:      Head: Normocephalic and atraumatic.      Right Ear: External ear normal.      Left Ear: External ear normal.      Nose: Nose normal.      Mouth/Throat:      Mouth: Mucous membranes are moist.      Pharynx: Oropharynx is clear.   Eyes:      Extraocular " Movements: Extraocular movements intact.      Conjunctiva/sclera: Conjunctivae normal.      Pupils: Pupils are equal, round, and reactive to light.   Cardiovascular:      Rate and Rhythm: Normal rate and regular rhythm.      Pulses: Normal pulses.      Heart sounds: Normal heart sounds.   Pulmonary:      Effort: Pulmonary effort is normal.      Breath sounds: Normal breath sounds.   Abdominal:      General: Bowel sounds are normal.      Palpations: Abdomen is soft.   Genitourinary:     Comments: 12/21 colonoscopy noted  Musculoskeletal:      Cervical back: Normal range of motion and neck supple.      Comments: Pain on flexion past 45 degrees  Bilateral knee crepitus   Lymphadenopathy:      Cervical: No cervical adenopathy.   Skin:     General: Skin is warm and dry.   Neurological:      General: No focal deficit present.      Mental Status: He is alert and oriented to person, place, and time.   Psychiatric:         Mood and Affect: Mood normal.         Behavior: Behavior normal.         Thought Content: Thought content normal.         Procedure:      Historical Data:    Hyperlipidemia-counseled on diet, fasting labs on Lipitor 20mg at goal  Hx of Afib-no recurrence with the ablation  Knee osteoarthritis-f/u Ortho  Bipolar/ADD/depression-well controlled on current tx, [facility] notes reviewed  Chronic back pain-f/u Dr Dumont, zanaflex prn  Elevated BS-AIC at goal, counseled on low carb     10/17 labs noted and dw patient    Current Outpatient Medications:     amphetamine-dextroamphetamine XR (Adderall XR) 15 MG 24 hr capsule, Take 1 capsule by mouth Daily, Disp: 30 capsule, Rfl: 0    atorvastatin (LIPITOR) 20 MG tablet, TAKE 1 TABLET BY MOUTH EVERY DAY IN THE EVENING, Disp: 90 tablet, Rfl: 3    FLUoxetine (PROzac) 40 MG capsule, Take 1 capsule by mouth Daily., Disp: 90 capsule, Rfl: 0    lamoTRIgine  MG tablet sustained-release 24 hour, TAKE 1 TABLET BY MOUTH EVERY DAY, Disp: 90 tablet, Rfl: 0    tiZANidine (ZANAFLEX) 4  MG tablet, TAKE 1 TABLET BY MOUTH EVERY 8 HOURS AS NEEDED FOR MUSCLE SPASMS., Disp: 270 tablet, Rfl: 0        Diagnoses and all orders for this visit:    1. Paroxysmal atrial fibrillation (Primary)  -     Basic Metabolic Panel    2. Mixed hyperlipidemia  -     TSH    3. Recurrent major depressive disorder, in full remission    4. Bipolar disorder, in partial remission, most recent episode depressed    5. Attention deficit hyperactivity disorder (ADHD), predominantly inattentive type    6. Primary osteoarthritis of both knees

## 2024-11-08 RX ORDER — ATORVASTATIN CALCIUM 20 MG/1
TABLET, FILM COATED ORAL
Qty: 90 TABLET | Refills: 3 | Status: SHIPPED | OUTPATIENT
Start: 2024-11-08

## 2024-11-09 DIAGNOSIS — F31.60 BIPOLAR AFFECTIVE DISORDER, MIXED: ICD-10-CM

## 2024-11-09 DIAGNOSIS — Z79.899 MEDICATION MANAGEMENT: ICD-10-CM

## 2024-11-10 RX ORDER — LAMOTRIGINE 100 MG/1
1 TABLET, EXTENDED RELEASE ORAL DAILY
Qty: 90 TABLET | Refills: 0 | Status: SHIPPED | OUTPATIENT
Start: 2024-11-10

## 2024-11-11 ENCOUNTER — TELEMEDICINE (OUTPATIENT)
Dept: PSYCHIATRY | Facility: CLINIC | Age: 49
End: 2024-11-11
Payer: COMMERCIAL

## 2024-11-11 DIAGNOSIS — Z79.899 MEDICATION MANAGEMENT: ICD-10-CM

## 2024-11-11 DIAGNOSIS — F31.60 BIPOLAR AFFECTIVE DISORDER, MIXED: Primary | ICD-10-CM

## 2024-11-11 DIAGNOSIS — F90.2 ATTENTION DEFICIT HYPERACTIVITY DISORDER (ADHD), COMBINED TYPE: ICD-10-CM

## 2024-11-11 DIAGNOSIS — F33.8 SEASONAL DEPRESSION: ICD-10-CM

## 2024-11-11 RX ORDER — DEXTROAMPHETAMINE SACCHARATE, AMPHETAMINE ASPARTATE MONOHYDRATE, DEXTROAMPHETAMINE SULFATE AND AMPHETAMINE SULFATE 3.75; 3.75; 3.75; 3.75 MG/1; MG/1; MG/1; MG/1
15 CAPSULE, EXTENDED RELEASE ORAL DAILY
Qty: 30 CAPSULE | Refills: 0 | Status: SHIPPED | OUTPATIENT
Start: 2024-11-11

## 2024-11-11 RX ORDER — FLUOXETINE 40 MG/1
40 CAPSULE ORAL DAILY
Qty: 90 CAPSULE | Refills: 0 | Status: SHIPPED | OUTPATIENT
Start: 2024-11-11

## 2024-11-11 NOTE — PROGRESS NOTES
"This provider is located at the [facility] (through [facility]), [address], [facility], [address] using a secure video visit through [employer]. Patient is being seen remotely via telehealth at their home address in [place], and stated they are in a secure environment for this session.   The patient's condition being consulted/diagnosed/treated is appropriate for telemedicine. The provider identified herself as well as her credentials.   The patient, and/or patients guardian, consent to be seen remotely, and when consent is given they understand that the consent allows for patient identifiable information to be sent to a third party as needed. They may refuse to be seen remotely at any time. The electronic data is encrypted and password protected, and the patient and/or guardian has been advised of the potential risks to privacy not withstanding such measures.   The use of a video visit has been reviewed with the patient and verbal informed consent has been obtained.   Patient identifiers used: Name and .    Subjective   Ronaldo Cantu is a 49 y.o. male who presents today for follow up    Chief Complaint:    Chief Complaint   Patient presents with    Anxiety    Depression    Med Management    ADHD        History of Present Illness:   History of Present Illness  Patient is a 49-year-old male presenting for 1 month follow-up related to depression, anxiety, irritability and ADHD.  He voices compliance with medications, denies side effects.  PHQ reduced from 5-2, minimal for depression with patient rates at 3/10 on average.  JACQUELINE remains the same with a score of 1 minimal for anxiety which patient rates at 2/10 on average.  He states conditions are \"all situational, it will pass.\"  States \"had a really bad day last Friday, went to bed early.\"  Has had moments of feeling like it is \"Groundhog Day, stuck in a rut.\"  Again, acknowledges this is situationally related.  " "Regarding his mood \"it has been okay, I have had a touch of agitation/irritability, felt like I was losing myself.\"  Denies SI, HI, SIB, or hallucinations currently and is convincing.  Adderall continues to be beneficial, he is able to focus on TV shows and focus and Judaism.  Medically he had a follow-up related to fatigue which he states is now improved.  Sleep is adequate.  Also recently suffered from a respiratory infection that has resolved.    Patient resides in a home with his wife of 8 years whom he cites as supportive.  He has 1 biological child and 2 stepchildren, all adults.  He has a college degree in biology and is now retired.  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  This has been stressful.  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  Jainism Zoroastrian preference.  Enjoys golfing.  Denies drug or alcohol use.  Some stress surrounding disability case.  Plans to go with niece to the basketball game in [place] tomorrow he is looking forward to.      The following portions of the patient's history were reviewed and updated as appropriate: allergies, current medications, past family history, past medical history, past social history, past surgical history and problem list.    Past Psychiatric History:  Began Treatment: 7 years ago for anger management  Diagnoses: unsure  Psychiatrist:Denies  Therapist: previously  Admission History:Denies  Medication Trials: effexor straterra (more irritable) and quelbree( suicidal), clonidine (increased thirst), wellbutrin (made worse), vyvanse (side effects-jaw clenching), adderall 7.5 BID (wore off), buspar 10 (headaches/sedating)  Self Harm: Denies  Suicide Attempts:Denies   Psychosis, Anxiety, Depression:  N/A    Past Medical History:  Past Medical History:   Diagnosis Date    Nephrolithiasis        Substance Abuse History:   Types:Denies all, including illicit  Withdrawal Symptoms:Denies  Longest Period Sober:Not Applicable   AA: Not applicable "     Social History:  Social History     Socioeconomic History    Marital status:     Number of children: 3   Tobacco Use    Smoking status: Never    Smokeless tobacco: Never   Vaping Use    Vaping status: Never Used   Substance and Sexual Activity    Alcohol use: Yes     Alcohol/week: 1.0 standard drink of alcohol     Types: 1 Cans of beer per week     Comment: 1 per day    Drug use: Never    Sexual activity: Defer       Family History:  Family History   Problem Relation Age of Onset    Heart attack Mother     Suicide Attempts Sister     Cancer Maternal Grandmother     Lung cancer Paternal Grandfather        Past Surgical History:  Past Surgical History:   Procedure Laterality Date    APPENDECTOMY  09/2010    CARDIAC ABLATION  11/2019    CERVICAL FUSION  10/2012    KNEE ARTHROPLASTY, PARTIAL REPLACEMENT  12/2022    KNEE ARTHROSCOPY  05/2021    SHOULDER ARTHROSCOPY         Problem List:  Patient Active Problem List   Diagnosis    Paroxysmal atrial fibrillation    Mixed hyperlipidemia    Primary osteoarthritis of both knees    Degeneration of lumbar or lumbosacral intervertebral disc    Toenail fungus    Bipolar affective disorder    Depression    Attention deficit hyperactivity disorder (ADHD)       Allergy:   Allergies   Allergen Reactions    Viloxazine Hcl Er Mental Status Change     Suicidal thoughts        Current Medications:   Current Outpatient Medications   Medication Sig Dispense Refill    amphetamine-dextroamphetamine XR (Adderall XR) 15 MG 24 hr capsule Take 1 capsule by mouth Daily 30 capsule 0    FLUoxetine (PROzac) 40 MG capsule Take 1 capsule by mouth Daily. 90 capsule 0    atorvastatin (LIPITOR) 20 MG tablet TAKE 1 TABLET BY MOUTH EVERY DAY IN THE EVENING 90 tablet 3    lamoTRIgine  MG tablet sustained-release 24 hour TAKE 1 TABLET BY MOUTH EVERY DAY 90 tablet 0    tiZANidine (ZANAFLEX) 4 MG tablet TAKE 1 TABLET BY MOUTH EVERY 8 HOURS AS NEEDED FOR MUSCLE SPASMS. 270 tablet 0     No  current facility-administered medications for this visit.       Review of Systems:    Review of Systems   Constitutional:  Positive for fatigue.   HENT: Negative.     Eyes: Negative.    Respiratory: Negative.     Cardiovascular: Negative.    Gastrointestinal: Negative.    Endocrine: Negative.    Genitourinary: Negative.         Kidney stone   Musculoskeletal:  Positive for back pain.   Skin: Negative.    Allergic/Immunologic: Negative.    Neurological: Negative.  Negative for seizures.   Hematological: Negative.    Psychiatric/Behavioral: Negative.  Positive for stress.          Physical Exam:   Physical Exam  Constitutional:       Appearance: Normal appearance. He is normal weight.   HENT:      Head: Normocephalic.      Nose: Nose normal.   Pulmonary:      Effort: Pulmonary effort is normal.   Musculoskeletal:         General: Normal range of motion.      Cervical back: Normal range of motion.   Neurological:      General: No focal deficit present.      Mental Status: He is alert and oriented to person, place, and time. Mental status is at baseline.   Psychiatric:         Attention and Perception: Attention and perception normal.         Mood and Affect: Mood and affect normal. Mood is anxious.         Speech: Speech normal.         Behavior: Behavior normal. Behavior is cooperative.         Thought Content: Thought content normal.         Cognition and Memory: Cognition and memory normal.         Judgment: Judgment normal.     Vitals:  There were no vitals taken for this visit. There is no height or weight on file to calculate BMI.  Due to extenuating circumstances and possible current health risks associated with the patient being present in a clinical setting (with current health restrictions in place in regards to possible COVID 19 transmission/exposure), the patient was seen remotely today via a MyChart Video Visit through [employer].  Unable to obtain vital signs due to nature of remote visit.  Height stated at  6ft1 inches.  Weight stated at 183 pounds.    Last 3 Blood Pressure Readings:  BP Readings from Last 3 Encounters:   10/17/24 108/70   07/19/24 110/70   04/12/24 108/70     PHQ-9 Depression Screening  Little interest or pleasure in doing things? (Patient-Rptd) Not at all   Feeling down, depressed, or hopeless? (Patient-Rptd) Several days   PHQ-2 Total Score (Patient-Rptd) 1   Trouble falling or staying asleep, or sleeping too much? (Patient-Rptd) Several days   Feeling tired or having little energy? (Patient-Rptd) Not at all   Poor appetite or overeating? (Patient-Rptd) Not at all   Feeling bad about yourself - or that you are a failure or have let yourself or your family down? (Patient-Rptd) Not at all   Trouble concentrating on things, such as reading the newspaper or watching television? (Patient-Rptd) Not at all   Moving or speaking so slowly that other people could have noticed? Or the opposite - being so fidgety or restless that you have been moving around a lot more than usual? (Patient-Rptd) Not at all   Thoughts that you would be better off dead, or of hurting yourself in some way? (Patient-Rptd) Not at all   PHQ-9 Total Score (Patient-Rptd) 2   If you checked off any problems, how difficult have these problems made it for you to do your work, take care of things at home, or get along with other people? (Patient-Rptd) Not difficult at all         JACQUELINE: 1    Mental Status Exam:   Hygiene:   good  Cooperation:  Cooperative  Eye Contact:  Good  Psychomotor Behavior:  Appropriate  Affect:  Appropriate  Mood: normal  Hopelessness: Denies  Speech:  Rapid  Thought Process:  Goal directed  Thought Content:  Normal  Suicidal:  None  Homicidal:  None  Hallucinations:  None  Delusion:  None  Memory:  Intact  Orientation:  Grossly intact  Reliability:  good  Insight:  Good  Judgement:  Fair  Impulse Control:  Fair  Physical/Medical Issues:   back surgeries, knee surgeries, cardiac ablation 2019        Lab Results:    Office Visit on 10/17/2024   Component Date Value Ref Range Status    Glucose 10/17/2024 88  65 - 99 mg/dL Final    BUN 10/17/2024 16  6 - 20 mg/dL Final    Creatinine 10/17/2024 1.16  0.76 - 1.27 mg/dL Final    Sodium 10/17/2024 139  136 - 145 mmol/L Final    Potassium 10/17/2024 4.3  3.5 - 5.2 mmol/L Final    Chloride 10/17/2024 104  98 - 107 mmol/L Final    CO2 10/17/2024 28.8  22.0 - 29.0 mmol/L Final    Calcium 10/17/2024 9.8  8.6 - 10.5 mg/dL Final    BUN/Creatinine Ratio 10/17/2024 13.8  7.0 - 25.0 Final    Anion Gap 10/17/2024 6.2  5.0 - 15.0 mmol/L Final    eGFR 10/17/2024 76.7  >60.0 mL/min/1.73 Final    TSH 10/17/2024 3.670  0.270 - 4.200 uIU/mL Final   Lab on 10/11/2024   Component Date Value Ref Range Status    THC, Screen, Urine 10/11/2024 Negative  Negative Final    Phencyclidine (PCP), Urine 10/11/2024 Negative  Negative Final    Cocaine Screen, Urine 10/11/2024 Negative  Negative Final    Methamphetamine, Ur 10/11/2024 Negative  Negative Final    Opiate Screen 10/11/2024 Negative  Negative Final    Amphetamine Screen, Urine 10/11/2024 Positive (A)  Negative Final    Benzodiazepine Screen, Urine 10/11/2024 Negative  Negative Final    Tricyclic Antidepressants Screen 10/11/2024 Negative  Negative Final    Methadone Screen, Urine 10/11/2024 Negative  Negative Final    Barbiturates Screen, Urine 10/11/2024 Negative  Negative Final    Oxycodone Screen, Urine 10/11/2024 Negative  Negative Final    Buprenorphine, Screen, Urine 10/11/2024 Negative  Negative Final    Fentanyl, Urine 10/11/2024 Negative  Negative Final   Lab on 08/28/2024   Component Date Value Ref Range Status    TSH 08/28/2024 4.670 (H)  0.270 - 4.200 uIU/mL Final   Office Visit on 07/19/2024   Component Date Value Ref Range Status    WBC 07/19/2024 7.21  3.40 - 10.80 10*3/mm3 Final    RBC 07/19/2024 4.91  4.14 - 5.80 10*6/mm3 Final    Hemoglobin 07/19/2024 14.3  13.0 - 17.7 g/dL Final    Hematocrit 07/19/2024 44.0  37.5 - 51.0 % Final     MCV 07/19/2024 89.6  79.0 - 97.0 fL Final    MCH 07/19/2024 29.1  26.6 - 33.0 pg Final    MCHC 07/19/2024 32.5  31.5 - 35.7 g/dL Final    RDW 07/19/2024 13.5  12.3 - 15.4 % Final    RDW-SD 07/19/2024 44.1  37.0 - 54.0 fl Final    MPV 07/19/2024 11.3  6.0 - 12.0 fL Final    Platelets 07/19/2024 172  140 - 450 10*3/mm3 Final    Glucose 07/19/2024 87  65 - 99 mg/dL Final    BUN 07/19/2024 18  6 - 20 mg/dL Final    Creatinine 07/19/2024 1.07  0.76 - 1.27 mg/dL Final    Sodium 07/19/2024 138  136 - 145 mmol/L Final    Potassium 07/19/2024 4.5  3.5 - 5.2 mmol/L Final    Chloride 07/19/2024 102  98 - 107 mmol/L Final    CO2 07/19/2024 24.3  22.0 - 29.0 mmol/L Final    Calcium 07/19/2024 9.2  8.6 - 10.5 mg/dL Final    Total Protein 07/19/2024 7.2  6.0 - 8.5 g/dL Final    Albumin 07/19/2024 4.4  3.5 - 5.2 g/dL Final    ALT (SGPT) 07/19/2024 33  1 - 41 U/L Final    AST (SGOT) 07/19/2024 34  1 - 40 U/L Final    Alkaline Phosphatase 07/19/2024 87  39 - 117 U/L Final    Total Bilirubin 07/19/2024 0.4  0.0 - 1.2 mg/dL Final    Globulin 07/19/2024 2.8  gm/dL Final    A/G Ratio 07/19/2024 1.6  g/dL Final    BUN/Creatinine Ratio 07/19/2024 16.8  7.0 - 25.0 Final    Anion Gap 07/19/2024 11.7  5.0 - 15.0 mmol/L Final    eGFR 07/19/2024 84.5  >60.0 mL/min/1.73 Final    Hemoglobin A1C 07/19/2024 5.50  4.80 - 5.60 % Final    Total Cholesterol 07/19/2024 159  0 - 200 mg/dL Final    Triglycerides 07/19/2024 68  0 - 150 mg/dL Final    HDL Cholesterol 07/19/2024 51  40 - 60 mg/dL Final    LDL Cholesterol  07/19/2024 95  0 - 100 mg/dL Final    VLDL Cholesterol 07/19/2024 13  5 - 40 mg/dL Final    LDL/HDL Ratio 07/19/2024 1.85   Final    TSH 07/19/2024 5.620 (H)  0.270 - 4.200 uIU/mL Final         Assessment & Plan   Problems Addressed this Visit          Mental Health    Attention deficit hyperactivity disorder (ADHD)    Relevant Medications    amphetamine-dextroamphetamine XR (Adderall XR) 15 MG 24 hr capsule    FLUoxetine (PROzac) 40 MG  capsule     Other Visit Diagnoses       Bipolar affective disorder, mixed    -  Primary    Relevant Medications    amphetamine-dextroamphetamine XR (Adderall XR) 15 MG 24 hr capsule    FLUoxetine (PROzac) 40 MG capsule    Seasonal depression        Relevant Medications    amphetamine-dextroamphetamine XR (Adderall XR) 15 MG 24 hr capsule    FLUoxetine (PROzac) 40 MG capsule    Medication management        Relevant Medications    amphetamine-dextroamphetamine XR (Adderall XR) 15 MG 24 hr capsule    FLUoxetine (PROzac) 40 MG capsule          Diagnoses         Codes Comments    Bipolar affective disorder, mixed    -  Primary ICD-10-CM: F31.60  ICD-9-CM: 296.60     Attention deficit hyperactivity disorder (ADHD), combined type     ICD-10-CM: F90.2  ICD-9-CM: 314.01     Seasonal depression     ICD-10-CM: F33.8  ICD-9-CM: 296.99     Medication management     ICD-10-CM: Z79.899  ICD-9-CM: V58.69             Visit Diagnoses:    ICD-10-CM ICD-9-CM   1. Bipolar affective disorder, mixed  F31.60 296.60   2. Attention deficit hyperactivity disorder (ADHD), combined type  F90.2 314.01   3. Seasonal depression  F33.8 296.99   4. Medication management  Z79.899 V58.69       Juan David recently reviewed and appropriate per report #[id number].  Prozac and Adderall refilled. Lamictal recently reviewed.  Patient voices stability with current medications and thus no changes made today. Previously educated upon side effects with use of these medications as well as when to present for emergency services, denies at this time.   Follow up with this provider in 4 to 5 weeks or sooner if needed.    The patient is being prescribed a controlled substance as part of the treatment plan. The patient has been educated of appropriate use of the medication(s), including risks and side effects such as insomnia, headache, exacerbation of tics, nervousness, irritability, overstimulation, tremor, dizziness, anorexia or change in appetite, nausea, dry mouth,  constipation, diarrhea, weight loss, sexual dysfunction, psychotic episodes, seizures, palpitations, tachycardia, hypertension, rare activation (activation of hypomania, lesa, and/or suicidal ideations), cardiovascular adverse effects including sudden death (especially patients with pre-existing structural abnormalities often associated with a family history of cardiac disease), may slow normal growth in children, weight gain is reported but not expected, sedation is possible but activation is much more common, metabolic adversities, the risk of tolerance and dependence, and overdose among others. Without the prescribed medication for ADHD, it is reported that the patient has problems with attention and focus including being easily distracted, easily losing objects, trouble with time management, trouble completing tasks because of distractions, procrastination, indecisiveness, careless mistakes in daily activities/work, and not finishing jobs that are started. The goals and objectives with treatment have been discussed including management of reported symptoms of ADHD, to minimize the impact of ADHD symptoms on patient function while maximizing the patient's ability to compensate or cope with any remaining difficulties, and to assist the patient with being successful and productive in their life/daily pursuits. The patient denies any side effects, no cardiovascular symptoms including palpitations or hypertension, no development or worsening of insomnia, and no development or worsening of anxiety symptoms on the medication. Due to the reported problems without the medication usage, as well as the reported significant improvement in symptoms with the medication usage, the patient requests to remain on the prescribed medication for ADHD and does not feel tapering or coming off of medication at this time is appropriate. The reported symptoms of ADHD, any reported resolution of symptoms, and the necessity and  appropriateness for continued treatment with a controlled substance will be reevaluated at each encounter. Patient is also informed that the medication is to be used by the patient only, the medication is to be used only as directed, and the medication should not be combined with other substances, OTC or prescription, unless directed by a Provider/Prescriber. The patient verbalized understanding and agreement with this in their own words, and wish to continue with the current treatment plan.       GOALS:  Short Term Goals: Patient will be compliant with medication, and patient will have no significant medication related side effects.  Patient will be engaged in psychotherapy as indicated.  Patient will report subjective improvement of symptoms.  Long term goals: To stabilize mood and treat/improve subjective symptoms, the patient will stay out of the hospital, the patient will be at an optimal level of functioning, and the patient will take all medications as prescribed.  The patient/guardian verbalized understanding and agreement with goals that were mutually set.      TREATMENT PLAN: Continue supportive psychotherapy efforts and medications as indicated.  Pharmacological and Non-Pharmacological treatment options discussed during today's visit. Patient/Guardian acknowledged and verbally consented with current treatment plan and was educated on the importance of compliance with treatment and follow-up appointments.      MEDICATION ISSUES:  Discussed medication options and treatment plan of prescribed medication as well as the risks, benefits, any black box warnings, and side effects including potential falls, possible impaired driving, and metabolic adversities among others. Patient is agreeable to call the office with any worsening of symptoms or onset of side effects, or if any concerns or questions arise.  The contact information for the office is made available to the patient. Patient is agreeable to call 911 or  go to the nearest ER should they begin having any SI/HI, or if any urgent concerns arise. No medication side effects or related complaints today.     MEDS ORDERED DURING VISIT:  New Medications Ordered This Visit   Medications    amphetamine-dextroamphetamine XR (Adderall XR) 15 MG 24 hr capsule     Sig: Take 1 capsule by mouth Daily     Dispense:  30 capsule     Refill:  0     No early fills    FLUoxetine (PROzac) 40 MG capsule     Sig: Take 1 capsule by mouth Daily.     Dispense:  90 capsule     Refill:  0       Follow Up Appointment:   Return in about 4 weeks (around 12/9/2024) for Recheck.             This document has been electronically signed by DEVEN Murillo  November 11, 2024 13:02 EST    Some of the data in this electronic note has been brought forward from a previous encounter, any necessary changes have been made, it has been reviewed by this APRN, and it is accurate.    Dictated Utilizing Dragon Dictation: Part of this note may be an electronic transcription/translation of spoken language to printed text using the Dragon Dictation System.

## 2024-12-09 ENCOUNTER — TELEMEDICINE (OUTPATIENT)
Dept: PSYCHIATRY | Facility: CLINIC | Age: 49
End: 2024-12-09
Payer: COMMERCIAL

## 2024-12-09 DIAGNOSIS — F90.2 ATTENTION DEFICIT HYPERACTIVITY DISORDER (ADHD), COMBINED TYPE: ICD-10-CM

## 2024-12-09 DIAGNOSIS — F33.8 SEASONAL DEPRESSION: ICD-10-CM

## 2024-12-09 DIAGNOSIS — F31.60 BIPOLAR AFFECTIVE DISORDER, MIXED: ICD-10-CM

## 2024-12-09 DIAGNOSIS — Z79.899 MEDICATION MANAGEMENT: ICD-10-CM

## 2024-12-09 PROCEDURE — 96127 BRIEF EMOTIONAL/BEHAV ASSMT: CPT

## 2024-12-09 PROCEDURE — 99214 OFFICE O/P EST MOD 30 MIN: CPT

## 2024-12-09 RX ORDER — DEXTROAMPHETAMINE SACCHARATE, AMPHETAMINE ASPARTATE MONOHYDRATE, DEXTROAMPHETAMINE SULFATE AND AMPHETAMINE SULFATE 3.75; 3.75; 3.75; 3.75 MG/1; MG/1; MG/1; MG/1
15 CAPSULE, EXTENDED RELEASE ORAL DAILY
Qty: 30 CAPSULE | Refills: 0 | Status: SHIPPED | OUTPATIENT
Start: 2024-12-09

## 2024-12-09 NOTE — PROGRESS NOTES
"This provider is located at the [facility] (through [facility]), [address], [facility], [address] using a secure video visit through [facility]. Patient is being seen remotely via telehealth at their home address in [place], and stated they are in a secure environment for this session.   The patient's condition being consulted/diagnosed/treated is appropriate for telemedicine. The provider identified herself as well as her credentials.   The patient, and/or patients guardian, consent to be seen remotely, and when consent is given they understand that the consent allows for patient identifiable information to be sent to a third party as needed. They may refuse to be seen remotely at any time. The electronic data is encrypted and password protected, and the patient and/or guardian has been advised of the potential risks to privacy not withstanding such measures.   The use of a video visit has been reviewed with the patient and verbal informed consent has been obtained.   Patient identifiers used: Name and .     Mode of Visit: Video  Location of patient: -HOME-  Location of provider: +HOME+  You have chosen to receive care through a telehealth visit.  The patient has signed the video visit consent form.  The visit included audio and video interaction. No technical issues occurred during this visit.      Subjective   Ronaldo Cantu is a 49 y.o. male who presents today for follow up    Chief Complaint:    Chief Complaint   Patient presents with    Anxiety    Depression    ADHD    Med Management        History of Present Illness:   History of Present Illness  Patient is a 49-year-old male presenting for 1 month follow-up related to depression, anxiety, irritability and ADHD.  He voices compliance with medications, denies side effects.  Indicates overall he is doing well.  PHQ reduce from 2-0, negative for depression which patient states \"nothing\" when asked.  JACQUELINE reduced " "from 1-0, negative for anxiety \"nothing right now.\"  States \"I do not clench\" regarding previously clenching his jaw.  Is working through some dental issues currently.  States he is able to maintain concentration with use of Adderall \"it is fine, working.\"  Regarding his appetite \"it is fine.\"  Regarding his appetite \"I felt like I was over eating but not anymore, it is fine.\" Sleep adequate \"7 hours, longer on the weekends.\"  Denies SI, HI, SIB, or hallucinations currently and is convincing.  Denies mood lability or agitation.   Indicates overall medications seem to be working.  Denies current medical concerns aside from after mentioned.    Patient resides in a home with his wife of 8 years whom he cites as supportive.  He has 1 biological child and 2 stepchildren, all adults.  He has a college degree in biology and is now retired.  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  This has been stressful.  Also some financial stress, stress surrounding disability.  [employer] preference.  Enjoys golfing.  Denies drug or alcohol use.  Some stress surrounding disability case.  Basketball game with niece was positive.  Plans to spend [place] with family.      The following portions of the patient's history were reviewed and updated as appropriate: allergies, current medications, past family history, past medical history, past social history, past surgical history and problem list.    Past Psychiatric History:  Began Treatment: 7 years ago for anger management  Diagnoses: unsure  Psychiatrist:Denies  Therapist: previously  Admission History:Denies  Medication Trials: effexor straterra (more irritable) and quelbree( suicidal), clonidine (increased thirst), wellbutrin (made worse), vyvanse (side effects-jaw clenching), adderall 7.5 BID (wore off), buspar 10 (headaches/sedating)  Self Harm: Denies  Suicide Attempts:Denies   Psychosis, Anxiety, Depression:  N/A    Past Medical History:  Past Medical History:   Diagnosis Date "    Nephrolithiasis        Substance Abuse History:   Types:Denies all, including illicit  Withdrawal Symptoms:Denies  Longest Period Sober:Not Applicable   AA: Not applicable     Social History:  Social History     Socioeconomic History    Marital status:     Number of children: 3   Tobacco Use    Smoking status: Never    Smokeless tobacco: Never   Vaping Use    Vaping status: Never Used   Substance and Sexual Activity    Alcohol use: Yes     Alcohol/week: 1.0 standard drink of alcohol     Types: 1 Cans of beer per week     Comment: 1 per day    Drug use: Never    Sexual activity: Defer       Family History:  Family History   Problem Relation Age of Onset    Heart attack Mother     Suicide Attempts Sister     Cancer Maternal Grandmother     Lung cancer Paternal Grandfather        Past Surgical History:  Past Surgical History:   Procedure Laterality Date    APPENDECTOMY  09/2010    CARDIAC ABLATION  11/2019    CERVICAL FUSION  10/2012    KNEE ARTHROPLASTY, PARTIAL REPLACEMENT  12/2022    KNEE ARTHROSCOPY  05/2021    SHOULDER ARTHROSCOPY         Problem List:  Patient Active Problem List   Diagnosis    Paroxysmal atrial fibrillation    Mixed hyperlipidemia    Primary osteoarthritis of both knees    Degeneration of lumbar or lumbosacral intervertebral disc    Toenail fungus    Bipolar affective disorder    Depression    Attention deficit hyperactivity disorder (ADHD)       Allergy:   Allergies   Allergen Reactions    Viloxazine Hcl Er Mental Status Change     Suicidal thoughts        Current Medications:   Current Outpatient Medications   Medication Sig Dispense Refill    amphetamine-dextroamphetamine XR (Adderall XR) 15 MG 24 hr capsule Take 1 capsule by mouth Daily 30 capsule 0    atorvastatin (LIPITOR) 20 MG tablet TAKE 1 TABLET BY MOUTH EVERY DAY IN THE EVENING 90 tablet 3    FLUoxetine (PROzac) 40 MG capsule Take 1 capsule by mouth Daily. 90 capsule 0    lamoTRIgine  MG tablet sustained-release 24  hour TAKE 1 TABLET BY MOUTH EVERY DAY 90 tablet 0    tiZANidine (ZANAFLEX) 4 MG tablet TAKE 1 TABLET BY MOUTH EVERY 8 HOURS AS NEEDED FOR MUSCLE SPASMS. 270 tablet 0     No current facility-administered medications for this visit.       Review of Systems:    Review of Systems   Constitutional:  Positive for fatigue.   HENT: Negative.     Eyes: Negative.    Respiratory: Negative.     Cardiovascular: Negative.    Gastrointestinal: Negative.    Endocrine: Negative.    Genitourinary: Negative.         Kidney stone   Musculoskeletal:  Positive for back pain.   Skin: Negative.    Allergic/Immunologic: Negative.    Neurological: Negative.  Negative for seizures.   Hematological: Negative.    Psychiatric/Behavioral: Negative.  Positive for stress.          Physical Exam:   Physical Exam  Constitutional:       Appearance: Normal appearance. He is normal weight.   HENT:      Head: Normocephalic.      Nose: Nose normal.   Pulmonary:      Effort: Pulmonary effort is normal.   Musculoskeletal:         General: Normal range of motion.      Cervical back: Normal range of motion.   Neurological:      General: No focal deficit present.      Mental Status: He is alert and oriented to person, place, and time. Mental status is at baseline.   Psychiatric:         Attention and Perception: Attention and perception normal.         Mood and Affect: Mood and affect normal.         Speech: Speech normal.         Behavior: Behavior normal. Behavior is cooperative.         Thought Content: Thought content normal.         Cognition and Memory: Cognition and memory normal.         Judgment: Judgment normal.     Vitals:  There were no vitals taken for this visit. There is no height or weight on file to calculate BMI.  Due to extenuating circumstances and possible current health risks associated with the patient being present in a clinical setting (with current health restrictions in place in regards to possible COVID 19 transmission/exposure),  the patient was seen remotely today via a MyChart Video Visit through [facility].  Unable to obtain vital signs due to nature of remote visit.  Height stated at 6ft1 inches.  Weight stated at 183 pounds.    Last 3 Blood Pressure Readings:  BP Readings from Last 3 Encounters:   10/17/24 108/70   07/19/24 110/70   04/12/24 108/70     PHQ-9 Depression Screening  Little interest or pleasure in doing things? (Patient-Rptd) Not at all   Feeling down, depressed, or hopeless? (Patient-Rptd) Not at all   PHQ-2 Total Score (Patient-Rptd) 0   Trouble falling or staying asleep, or sleeping too much? (Patient-Rptd) Not at all   Feeling tired or having little energy? (Patient-Rptd) Not at all   Poor appetite or overeating? (Patient-Rptd) Not at all   Feeling bad about yourself - or that you are a failure or have let yourself or your family down? (Patient-Rptd) Not at all   Trouble concentrating on things, such as reading the newspaper or watching television? (Patient-Rptd) Not at all   Moving or speaking so slowly that other people could have noticed? Or the opposite - being so fidgety or restless that you have been moving around a lot more than usual? (Patient-Rptd) Not at all   Thoughts that you would be better off dead, or of hurting yourself in some way? (Patient-Rptd) Not at all   PHQ-9 Total Score (Patient-Rptd) 0   If you checked off any problems, how difficult have these problems made it for you to do your work, take care of things at home, or get along with other people? (Patient-Rptd) Not difficult at all     JACQUELINE: 0    Mental Status Exam:   Hygiene:   good  Cooperation:  Cooperative  Eye Contact:  Good  Psychomotor Behavior:  Appropriate  Affect:  Appropriate  Mood: normal  Hopelessness: Denies  Speech:  Rapid  Thought Process:  Goal directed  Thought Content:  Normal  Suicidal:  None  Homicidal:  None  Hallucinations:  None  Delusion:  None  Memory:  Intact  Orientation:  Grossly intact  Reliability:  good  Insight:   Good  Judgement:  Fair  Impulse Control:  Fair  Physical/Medical Issues:   back surgeries, knee surgeries, cardiac ablation 2019        Lab Results:   Office Visit on 10/17/2024   Component Date Value Ref Range Status    Glucose 10/17/2024 88  65 - 99 mg/dL Final    BUN 10/17/2024 16  6 - 20 mg/dL Final    Creatinine 10/17/2024 1.16  0.76 - 1.27 mg/dL Final    Sodium 10/17/2024 139  136 - 145 mmol/L Final    Potassium 10/17/2024 4.3  3.5 - 5.2 mmol/L Final    Chloride 10/17/2024 104  98 - 107 mmol/L Final    CO2 10/17/2024 28.8  22.0 - 29.0 mmol/L Final    Calcium 10/17/2024 9.8  8.6 - 10.5 mg/dL Final    BUN/Creatinine Ratio 10/17/2024 13.8  7.0 - 25.0 Final    Anion Gap 10/17/2024 6.2  5.0 - 15.0 mmol/L Final    eGFR 10/17/2024 76.7  >60.0 mL/min/1.73 Final    TSH 10/17/2024 3.670  0.270 - 4.200 uIU/mL Final   Lab on 10/11/2024   Component Date Value Ref Range Status    THC, Screen, Urine 10/11/2024 Negative  Negative Final    Phencyclidine (PCP), Urine 10/11/2024 Negative  Negative Final    Cocaine Screen, Urine 10/11/2024 Negative  Negative Final    Methamphetamine, Ur 10/11/2024 Negative  Negative Final    Opiate Screen 10/11/2024 Negative  Negative Final    Amphetamine Screen, Urine 10/11/2024 Positive (A)  Negative Final    Benzodiazepine Screen, Urine 10/11/2024 Negative  Negative Final    Tricyclic Antidepressants Screen 10/11/2024 Negative  Negative Final    Methadone Screen, Urine 10/11/2024 Negative  Negative Final    Barbiturates Screen, Urine 10/11/2024 Negative  Negative Final    Oxycodone Screen, Urine 10/11/2024 Negative  Negative Final    Buprenorphine, Screen, Urine 10/11/2024 Negative  Negative Final    Fentanyl, Urine 10/11/2024 Negative  Negative Final   Lab on 08/28/2024   Component Date Value Ref Range Status    TSH 08/28/2024 4.670 (H)  0.270 - 4.200 uIU/mL Final   Office Visit on 07/19/2024   Component Date Value Ref Range Status    WBC 07/19/2024 7.21  3.40 - 10.80 10*3/mm3 Final    RBC  07/19/2024 4.91  4.14 - 5.80 10*6/mm3 Final    Hemoglobin 07/19/2024 14.3  13.0 - 17.7 g/dL Final    Hematocrit 07/19/2024 44.0  37.5 - 51.0 % Final    MCV 07/19/2024 89.6  79.0 - 97.0 fL Final    MCH 07/19/2024 29.1  26.6 - 33.0 pg Final    MCHC 07/19/2024 32.5  31.5 - 35.7 g/dL Final    RDW 07/19/2024 13.5  12.3 - 15.4 % Final    RDW-SD 07/19/2024 44.1  37.0 - 54.0 fl Final    MPV 07/19/2024 11.3  6.0 - 12.0 fL Final    Platelets 07/19/2024 172  140 - 450 10*3/mm3 Final    Glucose 07/19/2024 87  65 - 99 mg/dL Final    BUN 07/19/2024 18  6 - 20 mg/dL Final    Creatinine 07/19/2024 1.07  0.76 - 1.27 mg/dL Final    Sodium 07/19/2024 138  136 - 145 mmol/L Final    Potassium 07/19/2024 4.5  3.5 - 5.2 mmol/L Final    Chloride 07/19/2024 102  98 - 107 mmol/L Final    CO2 07/19/2024 24.3  22.0 - 29.0 mmol/L Final    Calcium 07/19/2024 9.2  8.6 - 10.5 mg/dL Final    Total Protein 07/19/2024 7.2  6.0 - 8.5 g/dL Final    Albumin 07/19/2024 4.4  3.5 - 5.2 g/dL Final    ALT (SGPT) 07/19/2024 33  1 - 41 U/L Final    AST (SGOT) 07/19/2024 34  1 - 40 U/L Final    Alkaline Phosphatase 07/19/2024 87  39 - 117 U/L Final    Total Bilirubin 07/19/2024 0.4  0.0 - 1.2 mg/dL Final    Globulin 07/19/2024 2.8  gm/dL Final    A/G Ratio 07/19/2024 1.6  g/dL Final    BUN/Creatinine Ratio 07/19/2024 16.8  7.0 - 25.0 Final    Anion Gap 07/19/2024 11.7  5.0 - 15.0 mmol/L Final    eGFR 07/19/2024 84.5  >60.0 mL/min/1.73 Final    Hemoglobin A1C 07/19/2024 5.50  4.80 - 5.60 % Final    Total Cholesterol 07/19/2024 159  0 - 200 mg/dL Final    Triglycerides 07/19/2024 68  0 - 150 mg/dL Final    HDL Cholesterol 07/19/2024 51  40 - 60 mg/dL Final    LDL Cholesterol  07/19/2024 95  0 - 100 mg/dL Final    VLDL Cholesterol 07/19/2024 13  5 - 40 mg/dL Final    LDL/HDL Ratio 07/19/2024 1.85   Final    TSH 07/19/2024 5.620 (H)  0.270 - 4.200 uIU/mL Final         Assessment & Plan   Problems Addressed this Visit          Mental Health    Attention deficit  hyperactivity disorder (ADHD)    Relevant Medications    amphetamine-dextroamphetamine XR (Adderall XR) 15 MG 24 hr capsule     Other Visit Diagnoses       Medication management        Relevant Medications    amphetamine-dextroamphetamine XR (Adderall XR) 15 MG 24 hr capsule    Seasonal depression        Relevant Medications    amphetamine-dextroamphetamine XR (Adderall XR) 15 MG 24 hr capsule    Bipolar affective disorder, mixed        Relevant Medications    amphetamine-dextroamphetamine XR (Adderall XR) 15 MG 24 hr capsule          Diagnoses         Codes Comments    Attention deficit hyperactivity disorder (ADHD), combined type     ICD-10-CM: F90.2  ICD-9-CM: 314.01     Medication management     ICD-10-CM: Z79.899  ICD-9-CM: V58.69     Seasonal depression     ICD-10-CM: F33.8  ICD-9-CM: 296.99     Bipolar affective disorder, mixed     ICD-10-CM: F31.60  ICD-9-CM: 296.60             Visit Diagnoses:    ICD-10-CM ICD-9-CM   1. Attention deficit hyperactivity disorder (ADHD), combined type  F90.2 314.01   2. Medication management  Z79.899 V58.69   3. Seasonal depression  F33.8 296.99   4. Bipolar affective disorder, mixed  F31.60 296.60         Juan David recently reviewed and appropriate per report #[id number].  Continue Prozac and Lamictal, previously sent 90-day supply last month.  Adderall refilled. Previously educated upon side effects with use of these medications as well as when to present for emergency services, denies at this time.  Follow up with this provider in 4 weeks or sooner if needed.    Risks, benefits, alternatives discussed with patient including GI upset, nausea vomiting diarrhea, theoretical decrease of seizure threshold predisposing the patient to a slightly higher seizure risk, headaches, sexual dysfunction, serotonin syndrome, bleeding risk, increased suicidality in patients 24 years and younger, switching to lesa/hypomania.  After discussion of these risks and benefits, the patient voiced  understanding and agreed to proceed.       The patient is being prescribed a controlled substance as part of the treatment plan. The patient has been educated of appropriate use of the medication(s), including risks and side effects such as insomnia, headache, exacerbation of tics, nervousness, irritability, overstimulation, tremor, dizziness, anorexia or change in appetite, nausea, dry mouth, constipation, diarrhea, weight loss, sexual dysfunction, psychotic episodes, seizures, palpitations, tachycardia, hypertension, rare activation (activation of hypomania, lesa, and/or suicidal ideations), cardiovascular adverse effects including sudden death (especially patients with pre-existing structural abnormalities often associated with a family history of cardiac disease), may slow normal growth in children, weight gain is reported but not expected, sedation is possible but activation is much more common, metabolic adversities, the risk of tolerance and dependence, and overdose among others. Without the prescribed medication for ADHD, it is reported that the patient has problems with attention and focus including being easily distracted, easily losing objects, trouble with time management, trouble completing tasks because of distractions, procrastination, indecisiveness, careless mistakes in daily activities/work, and not finishing jobs that are started. The goals and objectives with treatment have been discussed including management of reported symptoms of ADHD, to minimize the impact of ADHD symptoms on patient function while maximizing the patient's ability to compensate or cope with any remaining difficulties, and to assist the patient with being successful and productive in their life/daily pursuits. The patient denies any side effects, no cardiovascular symptoms including palpitations or hypertension, no development or worsening of insomnia, and no development or worsening of anxiety symptoms on the medication.  Due to the reported problems without the medication usage, as well as the reported significant improvement in symptoms with the medication usage, the patient requests to remain on the prescribed medication for ADHD and does not feel tapering or coming off of medication at this time is appropriate. The reported symptoms of ADHD, any reported resolution of symptoms, and the necessity and appropriateness for continued treatment with a controlled substance will be reevaluated at each encounter. Patient is also informed that the medication is to be used by the patient only, the medication is to be used only as directed, and the medication should not be combined with other substances, OTC or prescription, unless directed by a Provider/Prescriber. The patient verbalized understanding and agreement with this in their own words, and wish to continue with the current treatment plan.       GOALS:  Short Term Goals: Patient will be compliant with medication, and patient will have no significant medication related side effects.  Patient will be engaged in psychotherapy as indicated.  Patient will report subjective improvement of symptoms.  Long term goals: To stabilize mood and treat/improve subjective symptoms, the patient will stay out of the hospital, the patient will be at an optimal level of functioning, and the patient will take all medications as prescribed.  The patient/guardian verbalized understanding and agreement with goals that were mutually set.      TREATMENT PLAN: Continue supportive psychotherapy efforts and medications as indicated.  Pharmacological and Non-Pharmacological treatment options discussed during today's visit. Patient/Guardian acknowledged and verbally consented with current treatment plan and was educated on the importance of compliance with treatment and follow-up appointments.      MEDICATION ISSUES:  Discussed medication options and treatment plan of prescribed medication as well as the risks,  benefits, any black box warnings, and side effects including potential falls, possible impaired driving, and metabolic adversities among others. Patient is agreeable to call the office with any worsening of symptoms or onset of side effects, or if any concerns or questions arise.  The contact information for the office is made available to the patient. Patient is agreeable to call 911 or go to the nearest ER should they begin having any SI/HI, or if any urgent concerns arise. No medication side effects or related complaints today.     MEDS ORDERED DURING VISIT:  New Medications Ordered This Visit   Medications    amphetamine-dextroamphetamine XR (Adderall XR) 15 MG 24 hr capsule     Sig: Take 1 capsule by mouth Daily     Dispense:  30 capsule     Refill:  0     No early fills       Follow Up Appointment:   Return in about 4 weeks (around 1/6/2025) for Recheck.             This document has been electronically signed by DEVEN Murillo  December 9, 2024 12:58 EST    Some of the data in this electronic note has been brought forward from a previous encounter, any necessary changes have been made, it has been reviewed by this APRN, and it is accurate.    Dictated Utilizing Dragon Dictation: Part of this note may be an electronic transcription/translation of spoken language to printed text using the Dragon Dictation System.

## 2024-12-12 DIAGNOSIS — Z79.899 MEDICATION MANAGEMENT: ICD-10-CM

## 2024-12-12 DIAGNOSIS — F41.9 ANXIETY: ICD-10-CM

## 2024-12-12 RX ORDER — BUSPIRONE HYDROCHLORIDE 10 MG/1
TABLET ORAL
Qty: 90 TABLET | Refills: 0 | OUTPATIENT
Start: 2024-12-12

## 2025-01-14 ENCOUNTER — TELEMEDICINE (OUTPATIENT)
Dept: PSYCHIATRY | Facility: CLINIC | Age: 50
End: 2025-01-14
Payer: COMMERCIAL

## 2025-01-14 ENCOUNTER — PRIOR AUTHORIZATION (OUTPATIENT)
Dept: PSYCHIATRY | Facility: CLINIC | Age: 50
End: 2025-01-14

## 2025-01-14 DIAGNOSIS — Z79.899 MEDICATION MANAGEMENT: ICD-10-CM

## 2025-01-14 DIAGNOSIS — F31.60 BIPOLAR AFFECTIVE DISORDER, MIXED: ICD-10-CM

## 2025-01-14 DIAGNOSIS — F33.8 SEASONAL DEPRESSION: ICD-10-CM

## 2025-01-14 DIAGNOSIS — F90.2 ATTENTION DEFICIT HYPERACTIVITY DISORDER (ADHD), COMBINED TYPE: Primary | ICD-10-CM

## 2025-01-14 RX ORDER — DEXTROAMPHETAMINE SACCHARATE, AMPHETAMINE ASPARTATE MONOHYDRATE, DEXTROAMPHETAMINE SULFATE AND AMPHETAMINE SULFATE 5; 5; 5; 5 MG/1; MG/1; MG/1; MG/1
20 CAPSULE, EXTENDED RELEASE ORAL DAILY
Qty: 30 CAPSULE | Refills: 0 | Status: SHIPPED | OUTPATIENT
Start: 2025-01-14

## 2025-01-14 NOTE — PROGRESS NOTES
"This provider is located at the [facility] (through [facility]), [address] [place] [address], [facility], [address] using a secure video visit through [employer]. Patient is being seen remotely via telehealth at their home address in [place], and stated they are in a secure environment for this session.   The patient's condition being consulted/diagnosed/treated is appropriate for telemedicine. The provider identified herself as well as her credentials.   The patient, and/or patients guardian, consent to be seen remotely, and when consent is given they understand that the consent allows for patient identifiable information to be sent to a third party as needed. They may refuse to be seen remotely at any time. The electronic data is encrypted and password protected, and the patient and/or guardian has been advised of the potential risks to privacy not withstanding such measures.   The use of a video visit has been reviewed with the patient and verbal informed consent has been obtained.   Patient identifiers used: Name and .     Mode of Visit: Video  Location of patient: -HOME-  Location of provider: +HOME+  You have chosen to receive care through a telehealth visit.  The patient has signed the video visit consent form.  The visit included audio and video interaction. No technical issues occurred during this visit.      Subjective   Ronaldo Cantu is a 49 y.o. male who presents today for follow up    Chief Complaint:    Chief Complaint   Patient presents with    Anxiety    Depression    Med Management    ADHD    Irritable        History of Present Illness:   History of Present Illness  Patient is a 49-year-old male presenting for 1 month follow-up related to depression, anxiety, irritability and ADHD.  He voices compliance with medications, denies side effects.  Indicates overall he is doing well.  Admits he has been struggling somewhat with executive function \"my garage is a disaster, " "there is no organization.  I do not know if it is a bump in the road.\"  When asked about seasonal depression \"no.\"  Does not believe circumstances are related to lack of motivation.  States he has utilized Adderall nearly daily, will occasionally skip 1 day/week.  Denies cardiac symptoms with use, denies chest pain or jaw clenching.  States he has felt \"all over the place, I have to look for this and that.  I have lost so much stuff lately.\"  Also acknowledges feeling \"tired.\"  Denies appetite changes.  Sleep adequate.  PHQ increased from 0-2, still minimal for depression patient rates as 0/10 on average.  JACQUELINE remains same score of 0 negative for anxiety which patient rates as 0/10 on average.  Denies SI, HI, SIB, or hallucinations currently and is convincing.  When asked about mood swings \"nope.\"  Irritability \"nope.\"  Indicates mood condition, anxiety, and depression under control.  Medically he has been receiving shoulder injections, has an appointment related to this later this month.  He is considering back surgery.    Patient resides in a home with his wife of 8 years whom he cites as supportive.  He has 1 biological child and 2 stepchildren, all adults.  He has a college degree in biology and is now retired.  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  This has been stressful.  Also some financial stress, stress surrounding disability.  Hoahaoism Hinduism preference.  Enjoys golfing.  Denies drug or alcohol use.  Some stress surrounding disability case.  Spent [place] with family.  Unfortunately granddaughter had to have tonsillectomy performed today, she is not feeling well.      The following portions of the patient's history were reviewed and updated as appropriate: allergies, current medications, past family history, past medical history, past social history, past surgical history and problem list.    Past Psychiatric History:  Began Treatment: 7 years ago for anger management  Diagnoses: " unsure  Psychiatrist:Denies  Therapist: previously  Admission History:Denies  Medication Trials: effexor straterra (more irritable) and quelbree( suicidal), clonidine (increased thirst), wellbutrin (made worse), vyvanse (side effects-jaw clenching), adderall 7.5 BID (wore off), buspar 10 (headaches/sedating)  Self Harm: Denies  Suicide Attempts:Denies   Psychosis, Anxiety, Depression:  N/A    Past Medical History:  Past Medical History:   Diagnosis Date    Nephrolithiasis        Substance Abuse History:   Types:Denies all, including illicit  Withdrawal Symptoms:Denies  Longest Period Sober:Not Applicable   AA: Not applicable     Social History:  Social History     Socioeconomic History    Marital status:     Number of children: 3   Tobacco Use    Smoking status: Never    Smokeless tobacco: Never   Vaping Use    Vaping status: Never Used   Substance and Sexual Activity    Alcohol use: Yes     Alcohol/week: 1.0 standard drink of alcohol     Types: 1 Cans of beer per week     Comment: 1 per day    Drug use: Never    Sexual activity: Defer       Family History:  Family History   Problem Relation Age of Onset    Heart attack Mother     Suicide Attempts Sister     Cancer Maternal Grandmother     Lung cancer Paternal Grandfather        Past Surgical History:  Past Surgical History:   Procedure Laterality Date    APPENDECTOMY  2010    CARDIAC ABLATION  2019    CERVICAL FUSION  10/2012    KNEE ARTHROPLASTY, PARTIAL REPLACEMENT  2022    KNEE ARTHROSCOPY  2021    SHOULDER ARTHROSCOPY         Problem List:  Patient Active Problem List   Diagnosis    Paroxysmal atrial fibrillation    Mixed hyperlipidemia    Primary osteoarthritis of both knees    Degeneration of lumbar or lumbosacral intervertebral disc    Toenail fungus    Bipolar affective disorder    Depression    Attention deficit hyperactivity disorder (ADHD)       Allergy:   Allergies   Allergen Reactions    Viloxazine Hcl Er Mental Status  Change     Suicidal thoughts        Current Medications:   Current Outpatient Medications   Medication Sig Dispense Refill    amphetamine-dextroamphetamine XR (ADDERALL XR) 20 MG 24 hr capsule Take 1 capsule by mouth Daily 30 capsule 0    atorvastatin (LIPITOR) 20 MG tablet TAKE 1 TABLET BY MOUTH EVERY DAY IN THE EVENING 90 tablet 3    FLUoxetine (PROzac) 40 MG capsule Take 1 capsule by mouth Daily. 90 capsule 0    lamoTRIgine  MG tablet sustained-release 24 hour TAKE 1 TABLET BY MOUTH EVERY DAY 90 tablet 0    tiZANidine (ZANAFLEX) 4 MG tablet TAKE 1 TABLET BY MOUTH EVERY 8 HOURS AS NEEDED FOR MUSCLE SPASMS. 270 tablet 0     No current facility-administered medications for this visit.       Review of Systems:    Review of Systems   Constitutional:  Positive for fatigue.   HENT: Negative.     Eyes: Negative.    Respiratory: Negative.     Cardiovascular: Negative.    Gastrointestinal: Negative.    Endocrine: Negative.    Genitourinary: Negative.         Kidney stone   Musculoskeletal:  Positive for back pain.   Skin: Negative.    Allergic/Immunologic: Negative.    Neurological: Negative.  Negative for seizures.   Hematological: Negative.    Psychiatric/Behavioral:  Positive for decreased concentration and stress.          Physical Exam:   Physical Exam  Constitutional:       Appearance: Normal appearance. He is normal weight.   HENT:      Head: Normocephalic.      Nose: Nose normal.   Pulmonary:      Effort: Pulmonary effort is normal.   Musculoskeletal:         General: Normal range of motion.      Cervical back: Normal range of motion.   Neurological:      General: No focal deficit present.      Mental Status: He is alert and oriented to person, place, and time. Mental status is at baseline.   Psychiatric:         Attention and Perception: Attention and perception normal.         Mood and Affect: Mood and affect normal.         Speech: Speech normal.         Behavior: Behavior normal. Behavior is cooperative.          Thought Content: Thought content normal.         Cognition and Memory: Cognition and memory normal. Memory is impaired.         Judgment: Judgment normal.     Vitals:  There were no vitals taken for this visit. There is no height or weight on file to calculate BMI.  Due to extenuating circumstances and possible current health risks associated with the patient being present in a clinical setting (with current health restrictions in place in regards to possible COVID 19 transmission/exposure), the patient was seen remotely today via a MyChart Video Visit through [facility].  Unable to obtain vital signs due to nature of remote visit.  Height stated at 6ft1 inches.  Weight stated at 183 pounds.    Last 3 Blood Pressure Readings:  BP Readings from Last 3 Encounters:   10/17/24 108/70   07/19/24 110/70   04/12/24 108/70     PHQ-9 Depression Screening  Little interest or pleasure in doing things? (Patient-Rptd) Not at all   Feeling down, depressed, or hopeless? (Patient-Rptd) Not at all   PHQ-2 Total Score (Patient-Rptd) 0   Trouble falling or staying asleep, or sleeping too much? (Patient-Rptd) Not at all   Feeling tired or having little energy? (Patient-Rptd) Not at all   Poor appetite or overeating? (Patient-Rptd) Not at all   Feeling bad about yourself - or that you are a failure or have let yourself or your family down? (Patient-Rptd) Not at all   Trouble concentrating on things, such as reading the newspaper or watching television? (Patient-Rptd) Over half   Moving or speaking so slowly that other people could have noticed? Or the opposite - being so fidgety or restless that you have been moving around a lot more than usual? (Patient-Rptd) Not at all   Thoughts that you would be better off dead, or of hurting yourself in some way? (Patient-Rptd) Not at all   PHQ-9 Total Score (Patient-Rptd) 2   If you checked off any problems, how difficult have these problems made it for you to do your work, take care of things at  home, or get along with other people? (Patient-Rptd) Somewhat difficult     JACQUELINE: 0    Mental Status Exam:   Hygiene:   good  Cooperation:  Cooperative  Eye Contact:  Good  Psychomotor Behavior:  Appropriate  Affect:  Appropriate  Mood: normal  Hopelessness: Denies  Speech:  Rapid  Thought Process:  Goal directed  Thought Content:  Normal  Suicidal:  None  Homicidal:  None  Hallucinations:  None  Delusion:  None  Memory:  Intact  Orientation:  Grossly intact  Reliability:  good  Insight:  Good  Judgement:  Fair  Impulse Control:  Fair  Physical/Medical Issues:   back surgeries, knee surgeries, cardiac ablation 2019        Lab Results:   Office Visit on 10/17/2024   Component Date Value Ref Range Status    Glucose 10/17/2024 88  65 - 99 mg/dL Final    BUN 10/17/2024 16  6 - 20 mg/dL Final    Creatinine 10/17/2024 1.16  0.76 - 1.27 mg/dL Final    Sodium 10/17/2024 139  136 - 145 mmol/L Final    Potassium 10/17/2024 4.3  3.5 - 5.2 mmol/L Final    Chloride 10/17/2024 104  98 - 107 mmol/L Final    CO2 10/17/2024 28.8  22.0 - 29.0 mmol/L Final    Calcium 10/17/2024 9.8  8.6 - 10.5 mg/dL Final    BUN/Creatinine Ratio 10/17/2024 13.8  7.0 - 25.0 Final    Anion Gap 10/17/2024 6.2  5.0 - 15.0 mmol/L Final    eGFR 10/17/2024 76.7  >60.0 mL/min/1.73 Final    TSH 10/17/2024 3.670  0.270 - 4.200 uIU/mL Final   Lab on 10/11/2024   Component Date Value Ref Range Status    THC, Screen, Urine 10/11/2024 Negative  Negative Final    Phencyclidine (PCP), Urine 10/11/2024 Negative  Negative Final    Cocaine Screen, Urine 10/11/2024 Negative  Negative Final    Methamphetamine, Ur 10/11/2024 Negative  Negative Final    Opiate Screen 10/11/2024 Negative  Negative Final    Amphetamine Screen, Urine 10/11/2024 Positive (A)  Negative Final    Benzodiazepine Screen, Urine 10/11/2024 Negative  Negative Final    Tricyclic Antidepressants Screen 10/11/2024 Negative  Negative Final    Methadone Screen, Urine 10/11/2024 Negative  Negative Final     Barbiturates Screen, Urine 10/11/2024 Negative  Negative Final    Oxycodone Screen, Urine 10/11/2024 Negative  Negative Final    Buprenorphine, Screen, Urine 10/11/2024 Negative  Negative Final    Fentanyl, Urine 10/11/2024 Negative  Negative Final   Lab on 08/28/2024   Component Date Value Ref Range Status    TSH 08/28/2024 4.670 (H)  0.270 - 4.200 uIU/mL Final   Office Visit on 07/19/2024   Component Date Value Ref Range Status    WBC 07/19/2024 7.21  3.40 - 10.80 10*3/mm3 Final    RBC 07/19/2024 4.91  4.14 - 5.80 10*6/mm3 Final    Hemoglobin 07/19/2024 14.3  13.0 - 17.7 g/dL Final    Hematocrit 07/19/2024 44.0  37.5 - 51.0 % Final    MCV 07/19/2024 89.6  79.0 - 97.0 fL Final    MCH 07/19/2024 29.1  26.6 - 33.0 pg Final    MCHC 07/19/2024 32.5  31.5 - 35.7 g/dL Final    RDW 07/19/2024 13.5  12.3 - 15.4 % Final    RDW-SD 07/19/2024 44.1  37.0 - 54.0 fl Final    MPV 07/19/2024 11.3  6.0 - 12.0 fL Final    Platelets 07/19/2024 172  140 - 450 10*3/mm3 Final    Glucose 07/19/2024 87  65 - 99 mg/dL Final    BUN 07/19/2024 18  6 - 20 mg/dL Final    Creatinine 07/19/2024 1.07  0.76 - 1.27 mg/dL Final    Sodium 07/19/2024 138  136 - 145 mmol/L Final    Potassium 07/19/2024 4.5  3.5 - 5.2 mmol/L Final    Chloride 07/19/2024 102  98 - 107 mmol/L Final    CO2 07/19/2024 24.3  22.0 - 29.0 mmol/L Final    Calcium 07/19/2024 9.2  8.6 - 10.5 mg/dL Final    Total Protein 07/19/2024 7.2  6.0 - 8.5 g/dL Final    Albumin 07/19/2024 4.4  3.5 - 5.2 g/dL Final    ALT (SGPT) 07/19/2024 33  1 - 41 U/L Final    AST (SGOT) 07/19/2024 34  1 - 40 U/L Final    Alkaline Phosphatase 07/19/2024 87  39 - 117 U/L Final    Total Bilirubin 07/19/2024 0.4  0.0 - 1.2 mg/dL Final    Globulin 07/19/2024 2.8  gm/dL Final    A/G Ratio 07/19/2024 1.6  g/dL Final    BUN/Creatinine Ratio 07/19/2024 16.8  7.0 - 25.0 Final    Anion Gap 07/19/2024 11.7  5.0 - 15.0 mmol/L Final    eGFR 07/19/2024 84.5  >60.0 mL/min/1.73 Final    Hemoglobin A1C 07/19/2024 5.50   4.80 - 5.60 % Final    Total Cholesterol 07/19/2024 159  0 - 200 mg/dL Final    Triglycerides 07/19/2024 68  0 - 150 mg/dL Final    HDL Cholesterol 07/19/2024 51  40 - 60 mg/dL Final    LDL Cholesterol  07/19/2024 95  0 - 100 mg/dL Final    VLDL Cholesterol 07/19/2024 13  5 - 40 mg/dL Final    LDL/HDL Ratio 07/19/2024 1.85   Final    TSH 07/19/2024 5.620 (H)  0.270 - 4.200 uIU/mL Final         Assessment & Plan   Problems Addressed this Visit          Mental Health    Attention deficit hyperactivity disorder (ADHD) - Primary    Relevant Medications    amphetamine-dextroamphetamine XR (ADDERALL XR) 20 MG 24 hr capsule     Other Visit Diagnoses       Seasonal depression        Relevant Medications    amphetamine-dextroamphetamine XR (ADDERALL XR) 20 MG 24 hr capsule    Bipolar affective disorder, mixed        Relevant Medications    amphetamine-dextroamphetamine XR (ADDERALL XR) 20 MG 24 hr capsule    Medication management        Relevant Medications    amphetamine-dextroamphetamine XR (ADDERALL XR) 20 MG 24 hr capsule          Diagnoses         Codes Comments    Attention deficit hyperactivity disorder (ADHD), combined type    -  Primary ICD-10-CM: F90.2  ICD-9-CM: 314.01     Seasonal depression     ICD-10-CM: F33.8  ICD-9-CM: 296.99     Bipolar affective disorder, mixed     ICD-10-CM: F31.60  ICD-9-CM: 296.60     Medication management     ICD-10-CM: Z79.899  ICD-9-CM: V58.69             Visit Diagnoses:    ICD-10-CM ICD-9-CM   1. Attention deficit hyperactivity disorder (ADHD), combined type  F90.2 314.01   2. Seasonal depression  F33.8 296.99   3. Bipolar affective disorder, mixed  F31.60 296.60   4. Medication management  Z79.899 V58.69       Juan David recently reviewed and appropriate per report # [id number].  UDS current.  Continue Prozac and Lamictal, previously sent 90-day supply last month.  Previously educated upon side effects with use of these medications as well as when to present for emergency services,  denies at this time.  Follow up with this provider in 4 weeks or sooner if needed.    Risks, benefits, alternatives discussed with patient including GI upset, nausea vomiting diarrhea, theoretical decrease of seizure threshold predisposing the patient to a slightly higher seizure risk, headaches, sexual dysfunction, serotonin syndrome, bleeding risk, increased suicidality in patients 24 years and younger, switching to lesa/hypomania.  After discussion of these risks and benefits, the patient voiced understanding and agreed to proceed.       The patient is being prescribed a controlled substance as part of the treatment plan. The patient has been educated of appropriate use of the medication(s), including risks and side effects such as insomnia, headache, exacerbation of tics, nervousness, irritability, overstimulation, tremor, dizziness, anorexia or change in appetite, nausea, dry mouth, constipation, diarrhea, weight loss, sexual dysfunction, psychotic episodes, seizures, palpitations, tachycardia, hypertension, rare activation (activation of hypomania, lesa, and/or suicidal ideations), cardiovascular adverse effects including sudden death (especially patients with pre-existing structural abnormalities often associated with a family history of cardiac disease), may slow normal growth in children, weight gain is reported but not expected, sedation is possible but activation is much more common, metabolic adversities, the risk of tolerance and dependence, and overdose among others. Without the prescribed medication for ADHD, it is reported that the patient has problems with attention and focus including being easily distracted, easily losing objects, trouble with time management, trouble completing tasks because of distractions, procrastination, indecisiveness, careless mistakes in daily activities/work, and not finishing jobs that are started. The goals and objectives with treatment have been discussed including  management of reported symptoms of ADHD, to minimize the impact of ADHD symptoms on patient function while maximizing the patient's ability to compensate or cope with any remaining difficulties, and to assist the patient with being successful and productive in their life/daily pursuits. The patient denies any side effects, no cardiovascular symptoms including palpitations or hypertension, no development or worsening of insomnia, and no development or worsening of anxiety symptoms on the medication. Due to the reported problems without the medication usage, as well as the reported significant improvement in symptoms with the medication usage, the patient requests to remain on the prescribed medication for ADHD and does not feel tapering or coming off of medication at this time is appropriate. The reported symptoms of ADHD, any reported resolution of symptoms, and the necessity and appropriateness for continued treatment with a controlled substance will be reevaluated at each encounter. Patient is also informed that the medication is to be used by the patient only, the medication is to be used only as directed, and the medication should not be combined with other substances, OTC or prescription, unless directed by a Provider/Prescriber. The patient verbalized understanding and agreement with this in their own words, and wish to continue with the current treatment plan.       GOALS:  Short Term Goals: Patient will be compliant with medication, and patient will have no significant medication related side effects.  Patient will be engaged in psychotherapy as indicated.  Patient will report subjective improvement of symptoms.  Long term goals: To stabilize mood and treat/improve subjective symptoms, the patient will stay out of the hospital, the patient will be at an optimal level of functioning, and the patient will take all medications as prescribed.  The patient/guardian verbalized understanding and agreement with  goals that were mutually set.      TREATMENT PLAN: Continue supportive psychotherapy efforts and medications as indicated.  Pharmacological and Non-Pharmacological treatment options discussed during today's visit. Patient/Guardian acknowledged and verbally consented with current treatment plan and was educated on the importance of compliance with treatment and follow-up appointments.      MEDICATION ISSUES:  Discussed medication options and treatment plan of prescribed medication as well as the risks, benefits, any black box warnings, and side effects including potential falls, possible impaired driving, and metabolic adversities among others. Patient is agreeable to call the office with any worsening of symptoms or onset of side effects, or if any concerns or questions arise.  The contact information for the office is made available to the patient. Patient is agreeable to call 911 or go to the nearest ER should they begin having any SI/HI, or if any urgent concerns arise. No medication side effects or related complaints today.     MEDS ORDERED DURING VISIT:  New Medications Ordered This Visit   Medications    amphetamine-dextroamphetamine XR (ADDERALL XR) 20 MG 24 hr capsule     Sig: Take 1 capsule by mouth Daily     Dispense:  30 capsule     Refill:  0     No early refills-this is a dose increase       Follow Up Appointment:   Return in about 4 weeks (around 2/11/2025) for Recheck.             This document has been electronically signed by DEVEN Murillo  January 14, 2025 12:59 EST    Some of the data in this electronic note has been brought forward from a previous encounter, any necessary changes have been made, it has been reviewed by this APRN, and it is accurate.    Dictated Utilizing Dragon Dictation: Part of this note may be an electronic transcription/translation of spoken language to printed text using the Dragon Dictation System.

## 2025-01-15 DIAGNOSIS — M51.379 DEGENERATION OF LUMBAR OR LUMBOSACRAL INTERVERTEBRAL DISC: ICD-10-CM

## 2025-02-11 ENCOUNTER — TELEMEDICINE (OUTPATIENT)
Dept: PSYCHIATRY | Facility: CLINIC | Age: 50
End: 2025-02-11
Payer: COMMERCIAL

## 2025-02-11 DIAGNOSIS — Z79.899 MEDICATION MANAGEMENT: ICD-10-CM

## 2025-02-11 DIAGNOSIS — F33.8 SEASONAL DEPRESSION: ICD-10-CM

## 2025-02-11 DIAGNOSIS — F31.60 BIPOLAR AFFECTIVE DISORDER, MIXED: ICD-10-CM

## 2025-02-11 DIAGNOSIS — F90.2 ATTENTION DEFICIT HYPERACTIVITY DISORDER (ADHD), COMBINED TYPE: Primary | ICD-10-CM

## 2025-02-11 RX ORDER — DEXTROAMPHETAMINE SACCHARATE, AMPHETAMINE ASPARTATE MONOHYDRATE, DEXTROAMPHETAMINE SULFATE AND AMPHETAMINE SULFATE 5; 5; 5; 5 MG/1; MG/1; MG/1; MG/1
20 CAPSULE, EXTENDED RELEASE ORAL DAILY
Qty: 30 CAPSULE | Refills: 0 | Status: SHIPPED | OUTPATIENT
Start: 2025-02-11

## 2025-02-11 NOTE — PROGRESS NOTES
This provider is located at the [facility] (through [facility]), [address], [facility], [address] using a secure video visit through [employer]. Patient is being seen remotely via telehealth at their home address in [place], and stated they are in a secure environment for this session.   The patient's condition being consulted/diagnosed/treated is appropriate for telemedicine. The provider identified herself as well as her credentials.   The patient, and/or patients guardian, consent to be seen remotely, and when consent is given they understand that the consent allows for patient identifiable information to be sent to a third party as needed. They may refuse to be seen remotely at any time. The electronic data is encrypted and password protected, and the patient and/or guardian has been advised of the potential risks to privacy not withstanding such measures.   The use of a video visit has been reviewed with the patient and verbal informed consent has been obtained.   Patient identifiers used: Name and .     Mode of Visit: Video  Location of patient: -HOME-  Location of provider: +HOME+  You have chosen to receive care through a telehealth visit.  The patient has signed the video visit consent form.  The visit included audio and video interaction. No technical issues occurred during this visit.      Subjective   Ronaldo Cantu is a 49 y.o. male who presents today for follow up    Chief Complaint:    Chief Complaint   Patient presents with    Anxiety    Depression    Med Management    ADHD        History of Present Illness:   History of Present Illness  Patient is a 49-year-old male presenting for 1 month follow-up related to depression, anxiety, irritability and ADHD.  He voices compliance with medications, denies side effects.  Indicates overall he is doing well.  Has tolerated recent dose increase to Adderall well without side effects, indicates experiencing  "feeling tired later in the day which has been beneficial.  Also continues with Prozac and Lamictal.  PHQ reduced from 2-0, negative for depression with patient as 0/10 on average.  When asked about seasonal depression \"9.\"  JACQUELINE remains same with a score of 0 negative for anxiety patient rates as 0/10 on average.  Patient states he still \"starts feeling tired every evening, yawning by 6 PM.\"  Sleep is \"fine, good 8 hours.\"  Regarding mood \"it has been great.\"  Regarding irritability \"9.\"  Regarding appetite \"nothing crazy, I do not eat a lot.\"  Denies SI, HI, SIB, or hallucinations currently and is convincing.  Overall states he would like to leave medications \"as it is\" for now and reassess in 1 month.  Denies symptoms consistent with lesa or psychosis.  Denies medical status changes aside from continuing to pursue medical disability.  Some frustration with obtaining medical records related to this.  Also continues to see ortho for pain.  Considering surgery.    Patient resides in a home with his wife of 8 years whom he cites as supportive.  He has 1 biological child and 2 stepchildren, all adults.  He has a college degree in biology and is now retired.  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  This has been stressful.  Also some financial stress, stress surrounding disability.  [employer] preference.  Enjoys golfing.  Denies drug or alcohol use.  Some stress surrounding disability case.  Has been busy working on home projects.      The following portions of the patient's history were reviewed and updated as appropriate: allergies, current medications, past family history, past medical history, past social history, past surgical history and problem list.    Past Psychiatric History:  Began Treatment: 7 years ago for anger management  Diagnoses: unsure  Psychiatrist:Denies  Therapist: previously  Admission History:Denies  Medication Trials: effexor straterra (more irritable) and quelbree( suicidal), clonidine (increased " thirst), wellbutrin (made worse), vyvanse (side effects-jaw clenching), adderall 7.5 BID (wore off), buspar 10 (headaches/sedating)  Self Harm: Denies  Suicide Attempts:Denies   Psychosis, Anxiety, Depression:  N/A    Past Medical History:  Past Medical History:   Diagnosis Date    Nephrolithiasis        Substance Abuse History:   Types:Denies all, including illicit  Withdrawal Symptoms:Denies  Longest Period Sober:Not Applicable   AA: Not applicable     Social History:  Social History     Socioeconomic History    Marital status:     Number of children: 3   Tobacco Use    Smoking status: Never    Smokeless tobacco: Never   Vaping Use    Vaping status: Never Used   Substance and Sexual Activity    Alcohol use: Yes     Alcohol/week: 1.0 standard drink of alcohol     Types: 1 Cans of beer per week     Comment: 1 per day    Drug use: Never    Sexual activity: Defer       Family History:  Family History   Problem Relation Age of Onset    Heart attack Mother     Suicide Attempts Sister     Cancer Maternal Grandmother     Lung cancer Paternal Grandfather        Past Surgical History:  Past Surgical History:   Procedure Laterality Date    APPENDECTOMY  2010    CARDIAC ABLATION  2019    CERVICAL FUSION  10/2012    KNEE ARTHROPLASTY, PARTIAL REPLACEMENT  2022    KNEE ARTHROSCOPY  2021    SHOULDER ARTHROSCOPY         Problem List:  Patient Active Problem List   Diagnosis    Paroxysmal atrial fibrillation    Mixed hyperlipidemia    Primary osteoarthritis of both knees    Degeneration of lumbar or lumbosacral intervertebral disc    Toenail fungus    Bipolar affective disorder    Depression    Attention deficit hyperactivity disorder (ADHD)       Allergy:   Allergies   Allergen Reactions    Viloxazine Hcl Er Mental Status Change     Suicidal thoughts        Current Medications:   Current Outpatient Medications   Medication Sig Dispense Refill    amphetamine-dextroamphetamine XR (ADDERALL XR) 20 MG 24  hr capsule Take 1 capsule by mouth Daily 30 capsule 0    atorvastatin (LIPITOR) 20 MG tablet TAKE 1 TABLET BY MOUTH EVERY DAY IN THE EVENING 90 tablet 3    FLUoxetine (PROzac) 40 MG capsule Take 1 capsule by mouth Daily. 90 capsule 0    lamoTRIgine  MG tablet sustained-release 24 hour TAKE 1 TABLET BY MOUTH EVERY DAY 90 tablet 0    tiZANidine (ZANAFLEX) 4 MG tablet TAKE 1 TABLET BY MOUTH EVERY 8 HOURS AS NEEDED FOR MUSCLE SPASMS. 270 tablet 0     No current facility-administered medications for this visit.       Review of Systems:    Review of Systems   Constitutional:  Positive for fatigue.   HENT: Negative.     Eyes: Negative.    Respiratory: Negative.     Cardiovascular: Negative.    Gastrointestinal: Negative.    Endocrine: Negative.    Genitourinary: Negative.         Kidney stone   Musculoskeletal:  Positive for back pain.   Skin: Negative.    Allergic/Immunologic: Negative.    Neurological: Negative.  Negative for seizures.   Hematological: Negative.    Psychiatric/Behavioral:  Positive for stress.          Physical Exam:   Physical Exam  Constitutional:       Appearance: Normal appearance. He is normal weight.   HENT:      Head: Normocephalic.      Nose: Nose normal.   Pulmonary:      Effort: Pulmonary effort is normal.   Musculoskeletal:         General: Normal range of motion.      Cervical back: Normal range of motion.   Neurological:      General: No focal deficit present.      Mental Status: He is alert and oriented to person, place, and time. Mental status is at baseline.   Psychiatric:         Attention and Perception: Attention and perception normal.         Mood and Affect: Mood and affect normal.         Speech: Speech normal.         Behavior: Behavior normal. Behavior is cooperative.         Thought Content: Thought content normal.         Cognition and Memory: Cognition and memory normal. Memory is impaired.         Judgment: Judgment normal.     Vitals:  There were no vitals taken for  this visit. There is no height or weight on file to calculate BMI.  Due to extenuating circumstances and possible current health risks associated with the patient being present in a clinical setting (with current health restrictions in place in regards to possible COVID 19 transmission/exposure), the patient was seen remotely today via a MyChart Video Visit through [facility].  Unable to obtain vital signs due to nature of remote visit.  Height stated at 6ft1 inches.  Weight stated at 183 pounds.    Last 3 Blood Pressure Readings:  BP Readings from Last 3 Encounters:   10/17/24 108/70   07/19/24 110/70   04/12/24 108/70     PHQ-9 Depression Screening  Little interest or pleasure in doing things? (Patient-Rptd) Not at all   Feeling down, depressed, or hopeless? (Patient-Rptd) Not at all   PHQ-2 Total Score (Patient-Rptd) 0   Trouble falling or staying asleep, or sleeping too much? (Patient-Rptd) Not at all   Feeling tired or having little energy? (Patient-Rptd) Not at all   Poor appetite or overeating? (Patient-Rptd) Not at all   Feeling bad about yourself - or that you are a failure or have let yourself or your family down? (Patient-Rptd) Not at all   Trouble concentrating on things, such as reading the newspaper or watching television? (Patient-Rptd) Not at all   Moving or speaking so slowly that other people could have noticed? Or the opposite - being so fidgety or restless that you have been moving around a lot more than usual? (Patient-Rptd) Not at all   Thoughts that you would be better off dead, or of hurting yourself in some way? (Patient-Rptd) Not at all   PHQ-9 Total Score (Patient-Rptd) 0   If you checked off any problems, how difficult have these problems made it for you to do your work, take care of things at home, or get along with other people? (Patient-Rptd) Not difficult at all     JACQUELINE: 0    Mental Status Exam:   Hygiene:   good  Cooperation:  Cooperative  Eye Contact:  Good  Psychomotor Behavior:   Appropriate  Affect:  Appropriate  Mood: normal  Hopelessness: Denies  Speech:  Normal  Thought Process:  Goal directed  Thought Content:  Normal  Suicidal:  None  Homicidal:  None  Hallucinations:  None  Delusion:  None  Memory:  Intact  Orientation:  Grossly intact  Reliability:  good  Insight:  Good  Judgement:  Fair  Impulse Control:  Fair  Physical/Medical Issues:   back surgeries, knee surgeries, cardiac ablation 2019        Lab Results:   Office Visit on 10/17/2024   Component Date Value Ref Range Status    Glucose 10/17/2024 88  65 - 99 mg/dL Final    BUN 10/17/2024 16  6 - 20 mg/dL Final    Creatinine 10/17/2024 1.16  0.76 - 1.27 mg/dL Final    Sodium 10/17/2024 139  136 - 145 mmol/L Final    Potassium 10/17/2024 4.3  3.5 - 5.2 mmol/L Final    Chloride 10/17/2024 104  98 - 107 mmol/L Final    CO2 10/17/2024 28.8  22.0 - 29.0 mmol/L Final    Calcium 10/17/2024 9.8  8.6 - 10.5 mg/dL Final    BUN/Creatinine Ratio 10/17/2024 13.8  7.0 - 25.0 Final    Anion Gap 10/17/2024 6.2  5.0 - 15.0 mmol/L Final    eGFR 10/17/2024 76.7  >60.0 mL/min/1.73 Final    TSH 10/17/2024 3.670  0.270 - 4.200 uIU/mL Final   Lab on 10/11/2024   Component Date Value Ref Range Status    THC, Screen, Urine 10/11/2024 Negative  Negative Final    Phencyclidine (PCP), Urine 10/11/2024 Negative  Negative Final    Cocaine Screen, Urine 10/11/2024 Negative  Negative Final    Methamphetamine, Ur 10/11/2024 Negative  Negative Final    Opiate Screen 10/11/2024 Negative  Negative Final    Amphetamine Screen, Urine 10/11/2024 Positive (A)  Negative Final    Benzodiazepine Screen, Urine 10/11/2024 Negative  Negative Final    Tricyclic Antidepressants Screen 10/11/2024 Negative  Negative Final    Methadone Screen, Urine 10/11/2024 Negative  Negative Final    Barbiturates Screen, Urine 10/11/2024 Negative  Negative Final    Oxycodone Screen, Urine 10/11/2024 Negative  Negative Final    Buprenorphine, Screen, Urine 10/11/2024 Negative  Negative Final     Fentanyl, Urine 10/11/2024 Negative  Negative Final   Lab on 08/28/2024   Component Date Value Ref Range Status    TSH 08/28/2024 4.670 (H)  0.270 - 4.200 uIU/mL Final         Assessment & Plan   Problems Addressed this Visit          Mental Health    Attention deficit hyperactivity disorder (ADHD) - Primary    Relevant Medications    amphetamine-dextroamphetamine XR (ADDERALL XR) 20 MG 24 hr capsule     Other Visit Diagnoses       Bipolar affective disorder, mixed        Relevant Medications    amphetamine-dextroamphetamine XR (ADDERALL XR) 20 MG 24 hr capsule    Seasonal depression        Relevant Medications    amphetamine-dextroamphetamine XR (ADDERALL XR) 20 MG 24 hr capsule    Medication management        Relevant Medications    amphetamine-dextroamphetamine XR (ADDERALL XR) 20 MG 24 hr capsule          Diagnoses         Codes Comments    Attention deficit hyperactivity disorder (ADHD), combined type    -  Primary ICD-10-CM: F90.2  ICD-9-CM: 314.01     Bipolar affective disorder, mixed     ICD-10-CM: F31.60  ICD-9-CM: 296.60     Seasonal depression     ICD-10-CM: F33.8  ICD-9-CM: 296.99     Medication management     ICD-10-CM: Z79.899  ICD-9-CM: V58.69             Visit Diagnoses:    ICD-10-CM ICD-9-CM   1. Attention deficit hyperactivity disorder (ADHD), combined type  F90.2 314.01   2. Bipolar affective disorder, mixed  F31.60 296.60   3. Seasonal depression  F33.8 296.99   4. Medication management  Z79.899 V58.69         Juan David recently reviewed and appropriate per report #[id number]  UDS current.  Adderall refilled.  Continue Prozac and Lamictal, previously sent 90-day supply last month.  Previously educated upon side effects with use of these medications as well as when to present for emergency services, denies at this time.  Follow up with this provider in 4 weeks or sooner if needed.    Risks, benefits, alternatives discussed with patient including GI upset, nausea vomiting diarrhea, theoretical decrease  of seizure threshold predisposing the patient to a slightly higher seizure risk, headaches, sexual dysfunction, serotonin syndrome, bleeding risk, increased suicidality in patients 24 years and younger, switching to lesa/hypomania.  After discussion of these risks and benefits, the patient voiced understanding and agreed to proceed.       The patient is being prescribed a controlled substance as part of the treatment plan. The patient has been educated of appropriate use of the medication(s), including risks and side effects such as insomnia, headache, exacerbation of tics, nervousness, irritability, overstimulation, tremor, dizziness, anorexia or change in appetite, nausea, dry mouth, constipation, diarrhea, weight loss, sexual dysfunction, psychotic episodes, seizures, palpitations, tachycardia, hypertension, rare activation (activation of hypomania, lesa, and/or suicidal ideations), cardiovascular adverse effects including sudden death (especially patients with pre-existing structural abnormalities often associated with a family history of cardiac disease), may slow normal growth in children, weight gain is reported but not expected, sedation is possible but activation is much more common, metabolic adversities, the risk of tolerance and dependence, and overdose among others. Without the prescribed medication for ADHD, it is reported that the patient has problems with attention and focus including being easily distracted, easily losing objects, trouble with time management, trouble completing tasks because of distractions, procrastination, indecisiveness, careless mistakes in daily activities/work, and not finishing jobs that are started. The goals and objectives with treatment have been discussed including management of reported symptoms of ADHD, to minimize the impact of ADHD symptoms on patient function while maximizing the patient's ability to compensate or cope with any remaining difficulties, and to  assist the patient with being successful and productive in their life/daily pursuits. The patient denies any side effects, no cardiovascular symptoms including palpitations or hypertension, no development or worsening of insomnia, and no development or worsening of anxiety symptoms on the medication. Due to the reported problems without the medication usage, as well as the reported significant improvement in symptoms with the medication usage, the patient requests to remain on the prescribed medication for ADHD and does not feel tapering or coming off of medication at this time is appropriate. The reported symptoms of ADHD, any reported resolution of symptoms, and the necessity and appropriateness for continued treatment with a controlled substance will be reevaluated at each encounter. Patient is also informed that the medication is to be used by the patient only, the medication is to be used only as directed, and the medication should not be combined with other substances, OTC or prescription, unless directed by a Provider/Prescriber. The patient verbalized understanding and agreement with this in their own words, and wish to continue with the current treatment plan.       GOALS:  Short Term Goals: Patient will be compliant with medication, and patient will have no significant medication related side effects.  Patient will be engaged in psychotherapy as indicated.  Patient will report subjective improvement of symptoms.  Long term goals: To stabilize mood and treat/improve subjective symptoms, the patient will stay out of the hospital, the patient will be at an optimal level of functioning, and the patient will take all medications as prescribed.  The patient/guardian verbalized understanding and agreement with goals that were mutually set.      TREATMENT PLAN: Continue supportive psychotherapy efforts and medications as indicated.  Pharmacological and Non-Pharmacological treatment options discussed during today's  visit. Patient/Guardian acknowledged and verbally consented with current treatment plan and was educated on the importance of compliance with treatment and follow-up appointments.      MEDICATION ISSUES:  Discussed medication options and treatment plan of prescribed medication as well as the risks, benefits, any black box warnings, and side effects including potential falls, possible impaired driving, and metabolic adversities among others. Patient is agreeable to call the office with any worsening of symptoms or onset of side effects, or if any concerns or questions arise.  The contact information for the office is made available to the patient. Patient is agreeable to call 911 or go to the nearest ER should they begin having any SI/HI, or if any urgent concerns arise. No medication side effects or related complaints today.     MEDS ORDERED DURING VISIT:  New Medications Ordered This Visit   Medications    amphetamine-dextroamphetamine XR (ADDERALL XR) 20 MG 24 hr capsule     Sig: Take 1 capsule by mouth Daily     Dispense:  30 capsule     Refill:  0     No early refills-this is a dose increase       Follow Up Appointment:   Return in about 4 weeks (around 3/11/2025) for Recheck.             This document has been electronically signed by DEVEN Murillo  February 11, 2025 13:31 EST    Some of the data in this electronic note has been brought forward from a previous encounter, any necessary changes have been made, it has been reviewed by this APRN, and it is accurate.    Dictated Utilizing Dragon Dictation: Part of this note may be an electronic transcription/translation of spoken language to printed text using the Dragon Dictation System.

## 2025-03-11 ENCOUNTER — LAB (OUTPATIENT)
Dept: INTERNAL MEDICINE | Facility: CLINIC | Age: 50
End: 2025-03-11
Payer: COMMERCIAL

## 2025-03-11 ENCOUNTER — OFFICE VISIT (OUTPATIENT)
Dept: INTERNAL MEDICINE | Facility: CLINIC | Age: 50
End: 2025-03-11
Payer: COMMERCIAL

## 2025-03-11 VITALS
SYSTOLIC BLOOD PRESSURE: 110 MMHG | DIASTOLIC BLOOD PRESSURE: 70 MMHG | HEART RATE: 101 BPM | BODY MASS INDEX: 24.52 KG/M2 | HEIGHT: 73 IN | WEIGHT: 185 LBS | OXYGEN SATURATION: 97 %

## 2025-03-11 DIAGNOSIS — F31.75 BIPOLAR DISORDER, IN PARTIAL REMISSION, MOST RECENT EPISODE DEPRESSED: ICD-10-CM

## 2025-03-11 DIAGNOSIS — Z00.00 ROUTINE GENERAL MEDICAL EXAMINATION AT A HEALTH CARE FACILITY: ICD-10-CM

## 2025-03-11 DIAGNOSIS — I48.0 PAROXYSMAL ATRIAL FIBRILLATION: Primary | ICD-10-CM

## 2025-03-11 DIAGNOSIS — E78.2 MIXED HYPERLIPIDEMIA: ICD-10-CM

## 2025-03-11 DIAGNOSIS — Z12.5 SCREENING FOR PROSTATE CANCER: ICD-10-CM

## 2025-03-11 DIAGNOSIS — F90.0 ATTENTION DEFICIT HYPERACTIVITY DISORDER (ADHD), PREDOMINANTLY INATTENTIVE TYPE: ICD-10-CM

## 2025-03-11 LAB
ALBUMIN SERPL-MCNC: 3.9 G/DL (ref 3.5–5.2)
ALBUMIN/GLOB SERPL: 1.3 G/DL
ALP SERPL-CCNC: 86 U/L (ref 39–117)
ALT SERPL W P-5'-P-CCNC: 37 U/L (ref 1–41)
ANION GAP SERPL CALCULATED.3IONS-SCNC: 10.3 MMOL/L (ref 5–15)
AST SERPL-CCNC: 30 U/L (ref 1–40)
BILIRUB BLD-MCNC: NEGATIVE MG/DL
BILIRUB SERPL-MCNC: 0.3 MG/DL (ref 0–1.2)
BUN SERPL-MCNC: 16 MG/DL (ref 6–20)
BUN/CREAT SERPL: 15.4 (ref 7–25)
CALCIUM SPEC-SCNC: 9.1 MG/DL (ref 8.6–10.5)
CHLORIDE SERPL-SCNC: 104 MMOL/L (ref 98–107)
CHOLEST SERPL-MCNC: 138 MG/DL (ref 0–200)
CLARITY, POC: CLEAR
CO2 SERPL-SCNC: 25.7 MMOL/L (ref 22–29)
COLOR UR: YELLOW
CREAT SERPL-MCNC: 1.04 MG/DL (ref 0.76–1.27)
DEPRECATED RDW RBC AUTO: 42.7 FL (ref 37–54)
EGFRCR SERPLBLD CKD-EPI 2021: 86.9 ML/MIN/1.73
ERYTHROCYTE [DISTWIDTH] IN BLOOD BY AUTOMATED COUNT: 12.9 % (ref 12.3–15.4)
EXPIRATION DATE: NORMAL
GLOBULIN UR ELPH-MCNC: 3 GM/DL
GLUCOSE SERPL-MCNC: 92 MG/DL (ref 65–99)
GLUCOSE UR STRIP-MCNC: NEGATIVE MG/DL
HCT VFR BLD AUTO: 45.6 % (ref 37.5–51)
HDLC SERPL-MCNC: 49 MG/DL (ref 40–60)
HGB BLD-MCNC: 15.2 G/DL (ref 13–17.7)
KETONES UR QL: NEGATIVE
LDLC SERPL CALC-MCNC: 76 MG/DL (ref 0–100)
LDLC/HDLC SERPL: 1.56 {RATIO}
LEUKOCYTE EST, POC: NEGATIVE
Lab: NORMAL
MCH RBC QN AUTO: 29.8 PG (ref 26.6–33)
MCHC RBC AUTO-ENTMCNC: 33.3 G/DL (ref 31.5–35.7)
MCV RBC AUTO: 89.4 FL (ref 79–97)
NITRITE UR-MCNC: NEGATIVE MG/ML
PH UR: 6 [PH] (ref 5–8)
PLATELET # BLD AUTO: 204 10*3/MM3 (ref 140–450)
PMV BLD AUTO: 11 FL (ref 6–12)
POTASSIUM SERPL-SCNC: 4.6 MMOL/L (ref 3.5–5.2)
PROT SERPL-MCNC: 6.9 G/DL (ref 6–8.5)
PROT UR STRIP-MCNC: NEGATIVE MG/DL
RBC # BLD AUTO: 5.1 10*6/MM3 (ref 4.14–5.8)
RBC # UR STRIP: NEGATIVE /UL
SODIUM SERPL-SCNC: 140 MMOL/L (ref 136–145)
SP GR UR: 1.01 (ref 1–1.03)
TRIGL SERPL-MCNC: 62 MG/DL (ref 0–150)
UROBILINOGEN UR QL: NORMAL
VLDLC SERPL-MCNC: 13 MG/DL (ref 5–40)
WBC NRBC COR # BLD AUTO: 7.15 10*3/MM3 (ref 3.4–10.8)

## 2025-03-11 PROCEDURE — 80061 LIPID PANEL: CPT | Performed by: INTERNAL MEDICINE

## 2025-03-11 PROCEDURE — G0103 PSA SCREENING: HCPCS | Performed by: INTERNAL MEDICINE

## 2025-03-11 PROCEDURE — 82607 VITAMIN B-12: CPT | Performed by: INTERNAL MEDICINE

## 2025-03-11 PROCEDURE — 80050 GENERAL HEALTH PANEL: CPT | Performed by: INTERNAL MEDICINE

## 2025-03-11 NOTE — PROGRESS NOTES
Patient is a 49 y.o. male who is here for a physical.  Chief Complaint   Patient presents with    Annual Exam         HPI:    Here for CPE.     History:     Patient Active Problem List   Diagnosis    Paroxysmal atrial fibrillation    Mixed hyperlipidemia    Primary osteoarthritis of both knees    Degeneration of lumbar or lumbosacral intervertebral disc    Toenail fungus    Bipolar affective disorder    Depression    Attention deficit hyperactivity disorder (ADHD)       Past Medical History:   Diagnosis Date    ADHD (attention deficit hyperactivity disorder) January 2021    Srinivasa always known srinivasa had a problem with focusing and paying attention and it progressively was getting worse    Chronic pain disorder 2010    Chronic back pain    Depression Danny 3    On 40mg of Prozac    Headache January 2022    Started happening after COVID booster    Low back pain 20 years ago    Nerve ablation September 2022    Nephrolithiasis     PTSD (post-traumatic stress disorder) November 4, 2023    Severe Right knee pain knowing its bone on bone but terrified to have surgery again       Past Surgical History:   Procedure Laterality Date    APPENDECTOMY  09/2010    CARDIAC ABLATION  11/2019    CARDIAC SURGERY  Nov 2019    Heart ablation    CERVICAL FUSION  10/2012    COLONOSCOPY  11/2021    JOINT REPLACEMENT  Dec 1 2022    Partial knee replacement Left knee    KNEE ARTHROPLASTY, PARTIAL REPLACEMENT  12/2022    KNEE ARTHROSCOPY  05/2021    SHOULDER ARTHROSCOPY      VASECTOMY  Dec 2014       Current Outpatient Medications on File Prior to Visit   Medication Sig    amphetamine-dextroamphetamine XR (ADDERALL XR) 20 MG 24 hr capsule Take 1 capsule by mouth Daily    atorvastatin (LIPITOR) 20 MG tablet TAKE 1 TABLET BY MOUTH EVERY DAY IN THE EVENING    FLUoxetine (PROzac) 40 MG capsule Take 1 capsule by mouth Daily.    lamoTRIgine  MG tablet sustained-release 24 hour TAKE 1 TABLET BY MOUTH EVERY DAY    tiZANidine (ZANAFLEX) 4 MG tablet  TAKE 1 TABLET BY MOUTH EVERY 8 HOURS AS NEEDED FOR MUSCLE SPASMS.     No current facility-administered medications on file prior to visit.       Family History   Problem Relation Age of Onset    Heart attack Mother     Suicide Attempts Sister     Cancer Maternal Grandmother     Lung cancer Paternal Grandfather     Suicide Attempts Sister        Social History     Socioeconomic History    Marital status:     Number of children: 3   Tobacco Use    Smoking status: Never    Smokeless tobacco: Never   Vaping Use    Vaping status: Never Used   Substance and Sexual Activity    Alcohol use: Yes     Alcohol/week: 1.0 standard drink of alcohol     Types: 1 Cans of beer per week     Comment: 1 per day    Drug use: Never    Sexual activity: Yes     Partners: Female     Birth control/protection: Vasectomy         Review of Systems   Constitutional:  Positive for fatigue. Negative for chills, fever and unexpected weight change.   HENT:  Negative for congestion, ear pain, hearing loss, rhinorrhea, sinus pressure, sore throat and trouble swallowing.    Eyes:  Negative for discharge and itching.   Respiratory:  Negative for cough, chest tightness and shortness of breath.    Cardiovascular:  Negative for chest pain, palpitations and leg swelling.   Gastrointestinal:  Negative for abdominal pain, blood in stool, constipation, diarrhea and vomiting.        12/21 without polyps    Endocrine: Negative for polydipsia and polyuria.   Genitourinary:  Negative for difficulty urinating, dysuria, enuresis, frequency, hematuria and urgency.        3/25 psa   Musculoskeletal:  Positive for arthralgias. Negative for back pain, gait problem and joint swelling.   Skin:  Negative for rash and wound.   Allergic/Immunologic: Negative for immunocompromised state.   Neurological:  Negative for dizziness, syncope, weakness, light-headedness and numbness.   Hematological:  Does not bruise/bleed easily.   Psychiatric/Behavioral:  Negative for  "behavioral problems, dysphoric mood and sleep disturbance. The patient is not nervous/anxious.        /70   Pulse 101   Ht 185.4 cm (72.99\")   Wt 83.9 kg (185 lb)   SpO2 97%   BMI 24.41 kg/m²       Physical Exam  Constitutional:       Appearance: Normal appearance. He is well-developed.   HENT:      Head: Normocephalic and atraumatic.      Right Ear: External ear normal.      Left Ear: External ear normal.      Nose: Nose normal.      Mouth/Throat:      Mouth: Mucous membranes are moist.      Pharynx: Oropharynx is clear.   Eyes:      Extraocular Movements: Extraocular movements intact.      Conjunctiva/sclera: Conjunctivae normal.      Pupils: Pupils are equal, round, and reactive to light.   Cardiovascular:      Rate and Rhythm: Normal rate and regular rhythm.      Pulses: Normal pulses.      Heart sounds: Normal heart sounds.   Pulmonary:      Effort: Pulmonary effort is normal.      Breath sounds: Normal breath sounds.   Abdominal:      General: Bowel sounds are normal.      Palpations: Abdomen is soft.   Genitourinary:     Comments: 12/21 colonoscopy noted  Musculoskeletal:      Cervical back: Normal range of motion and neck supple.      Comments: Pain on flexion past 45 degrees  Bilateral knee crepitus   Lymphadenopathy:      Cervical: No cervical adenopathy.   Skin:     General: Skin is warm and dry.   Neurological:      General: No focal deficit present.      Mental Status: He is alert and oriented to person, place, and time.   Psychiatric:         Mood and Affect: Mood normal.         Behavior: Behavior normal.         Thought Content: Thought content normal.         Procedure:      Historical Data:    HME-counseled on diet and exercise, fasting labs today dw patient  Hyperlipidemia-counseled on diet, fasting labs on Lipitor 20mg  Hx of Afib-no recurrence with the ablation  Knee osteoarthritis-f/u Ortho  Bipolar/ADD/depression-well controlled on current tx, [facility] notes reviewed  Chronic back " pain-f/u Dr Archuleta, zanaflex prn  Elevated BS-AIC at goal, counseled on low carb     3/11 labs noted and dw patient    Reviewed the following with the patient: advised patient to avoid alcoholic beverages, encouraged patient to exercise 5-7 days per week for 30 minutes at a time, and ideal body weight discussed with patient.      Current Outpatient Medications:     amphetamine-dextroamphetamine XR (ADDERALL XR) 20 MG 24 hr capsule, Take 1 capsule by mouth Daily, Disp: 30 capsule, Rfl: 0    atorvastatin (LIPITOR) 20 MG tablet, TAKE 1 TABLET BY MOUTH EVERY DAY IN THE EVENING, Disp: 90 tablet, Rfl: 3    FLUoxetine (PROzac) 40 MG capsule, Take 1 capsule by mouth Daily., Disp: 90 capsule, Rfl: 0    lamoTRIgine  MG tablet sustained-release 24 hour, TAKE 1 TABLET BY MOUTH EVERY DAY, Disp: 90 tablet, Rfl: 0    tiZANidine (ZANAFLEX) 4 MG tablet, TAKE 1 TABLET BY MOUTH EVERY 8 HOURS AS NEEDED FOR MUSCLE SPASMS., Disp: 270 tablet, Rfl: 0        Diagnoses and all orders for this visit:    1. Paroxysmal atrial fibrillation (Primary)    2. Mixed hyperlipidemia    3. Bipolar disorder, in partial remission, most recent episode depressed    4. Attention deficit hyperactivity disorder (ADHD), predominantly inattentive type    5. Routine general medical examination at a health care facility  -     CBC (No Diff)  -     Comprehensive Metabolic Panel  -     Lipid Panel  -     TSH  -     PSA Screen  -     Vitamin B12  -     POC Urinalysis Dipstick, Automated    6. Screening for prostate cancer  -     PSA Screen

## 2025-03-12 ENCOUNTER — TELEMEDICINE (OUTPATIENT)
Dept: PSYCHIATRY | Facility: CLINIC | Age: 50
End: 2025-03-12
Payer: COMMERCIAL

## 2025-03-12 DIAGNOSIS — Z79.899 MEDICATION MANAGEMENT: ICD-10-CM

## 2025-03-12 DIAGNOSIS — F90.2 ATTENTION DEFICIT HYPERACTIVITY DISORDER (ADHD), COMBINED TYPE: Primary | ICD-10-CM

## 2025-03-12 DIAGNOSIS — F31.60 BIPOLAR AFFECTIVE DISORDER, MIXED: ICD-10-CM

## 2025-03-12 DIAGNOSIS — F33.8 SEASONAL DEPRESSION: ICD-10-CM

## 2025-03-12 LAB
PSA SERPL-MCNC: 0.9 NG/ML (ref 0–4)
TSH SERPL DL<=0.05 MIU/L-ACNC: 3.14 UIU/ML (ref 0.27–4.2)
VIT B12 BLD-MCNC: 409 PG/ML (ref 211–946)

## 2025-03-12 RX ORDER — FLUOXETINE HYDROCHLORIDE 40 MG/1
40 CAPSULE ORAL DAILY
Qty: 90 CAPSULE | Refills: 0 | Status: SHIPPED | OUTPATIENT
Start: 2025-03-12

## 2025-03-12 RX ORDER — DEXTROAMPHETAMINE SACCHARATE, AMPHETAMINE ASPARTATE MONOHYDRATE, DEXTROAMPHETAMINE SULFATE AND AMPHETAMINE SULFATE 6.25; 6.25; 6.25; 6.25 MG/1; MG/1; MG/1; MG/1
25 CAPSULE, EXTENDED RELEASE ORAL DAILY
Qty: 30 CAPSULE | Refills: 0 | Status: SHIPPED | OUTPATIENT
Start: 2025-03-12

## 2025-03-12 RX ORDER — LAMOTRIGINE 100 MG/1
1 TABLET, EXTENDED RELEASE ORAL DAILY
Qty: 90 TABLET | Refills: 0 | Status: SHIPPED | OUTPATIENT
Start: 2025-03-12

## 2025-03-12 NOTE — PROGRESS NOTES
"This provider is located at the [facility] (through [facility]), [address], [facility], [address] using a secure video visit through [employer]. Patient is being seen remotely via telehealth at their home address in [place], and stated they are in a secure environment for this session. The patient's condition being consulted/diagnosed/treated is appropriate for telemedicine. The provider identified herself as well as her credentials.   The patient, and/or patients guardian, consent to be seen remotely, and when consent is given they understand that the consent allows for patient identifiable information to be sent to a third party as needed. They may refuse to be seen remotely at any time. The electronic data is encrypted and password protected, and the patient and/or guardian has been advised of the potential risks to privacy not withstanding such measures.   The use of a video visit has been reviewed with the patient and verbal informed consent has been obtained.   Patient identifiers used: Name and .     Mode of Visit: Video  Location of patient: -HOME-  Location of provider: +HOME+  You have chosen to receive care through a telehealth visit.  The patient has signed the video visit consent form.  The visit included audio and video interaction. No technical issues occurred during this visit.      Subjective   Ronaldo Catnu is a 49 y.o. male who presents today for follow up    Chief Complaint:    Chief Complaint   Patient presents with    Anxiety    Depression    ADHD    Med Management    Irritable        History of Present Illness:   History of Present Illness  Patient is a 49-year-old male presenting for 1 month follow-up related to depression, anxiety, irritability and ADHD.  He voices compliance with medications, denies side effects.  Denies cardiac symptoms with use of Adderall, was recently assessed by cardiology \"everything turned out well.\"  Patient denies " "changes were made.  Unfortunately is suffering from some increased ADHD symptoms \"I think we need to up it\" regarding use of Adderall.  States \"my mind has been all over the place.\"  Recently went to [employer] and was unable to recite what isac was.  States \"daydreaming here and there, I cannot focus on anything, I will go start something and forget where I am going.\"  PHQ mainstay with a score of 0 negative for depression which patient rates as 0/10 on average \"none.\"  States \"seasonal has been fine.\"  JACQUELINE remains same with a score of 0 negative for anxiety which patient states \"no.\"  Sleep overall is okay \"my shoulder hurts all the time, cannot sleep.\"  Continues to notice efficacy with use of Prozac and Lamictal.  Denies mood swings or agitation.  Denies lesa.  States others have noticed improvement in mental health \"all my gosh yes, do not mind to having people they come to the house now.\"  Denies side effects with use of these medications.  Denies SI, HI, SIB, or hallucinations currently and is convincing.  Denies alternative medical status changes, continues to consider shoulder surgery.    Patient resides in a home with his wife of 8 years whom he cites as supportive.  He has 1 biological child and 2 stepchildren, all adults.  He has a college degree in biology and is now retired.  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  This has been stressful.  Also some financial stress, stress surrounding disability.  [employer] preference.  Enjoys golfing.  Denies drug or alcohol use.  Some stress surrounding disability case.  Has been busy working on home projects.      The following portions of the patient's history were reviewed and updated as appropriate: allergies, current medications, past family history, past medical history, past social history, past surgical history and problem list.    Past Psychiatric History:  Began Treatment: 7 years ago for anger management  Diagnoses: unsure  Psychiatrist:Denies  Therapist: " previously  Admission History:Denies  Medication Trials: effexor straterra (more irritable) and quelbree( suicidal), clonidine (increased thirst), wellbutrin (made worse), vyvanse (side effects-jaw clenching), adderall 7.5 BID (wore off), buspar 10 (headaches/sedating)  Self Harm: Denies  Suicide Attempts:Denies   Psychosis, Anxiety, Depression:  N/A    Past Medical History:  Past Medical History:   Diagnosis Date    ADHD (attention deficit hyperactivity disorder) 2021    Srinivasa always known srinivasa had a problem with focusing and paying attention and it progressively was getting worse    Chronic pain disorder     Chronic back pain    Depression Danny 3    On 40mg of Prozac    Headache 2022    Started happening after COVID booster    Low back pain 20 years ago    Nerve ablation 2022    Nephrolithiasis     PTSD (post-traumatic stress disorder) 2023    Severe Right knee pain knowing its bone on bone but terrified to have surgery again       Substance Abuse History:   Types:Denies all, including illicit  Withdrawal Symptoms:Denies  Longest Period Sober:Not Applicable   AA: Not applicable     Social History:  Social History     Socioeconomic History    Marital status:     Number of children: 3   Tobacco Use    Smoking status: Never    Smokeless tobacco: Never   Vaping Use    Vaping status: Never Used   Substance and Sexual Activity    Alcohol use: Yes     Alcohol/week: 1.0 standard drink of alcohol     Types: 1 Cans of beer per week     Comment: 1 per day    Drug use: Never    Sexual activity: Yes     Partners: Female     Birth control/protection: Vasectomy       Family History:  Family History   Problem Relation Age of Onset    Heart attack Mother     Suicide Attempts Sister     Cancer Maternal Grandmother     Lung cancer Paternal Grandfather     Suicide Attempts Sister        Past Surgical History:  Past Surgical History:   Procedure Laterality Date    APPENDECTOMY   09/2010    CARDIAC ABLATION  11/2019    CARDIAC SURGERY  Nov 2019    Heart ablation    CERVICAL FUSION  10/2012    COLONOSCOPY  11/2021    JOINT REPLACEMENT  Dec 1 2022    Partial knee replacement Left knee    KNEE ARTHROPLASTY, PARTIAL REPLACEMENT  12/2022    KNEE ARTHROSCOPY  05/2021    SHOULDER ARTHROSCOPY      VASECTOMY  Dec 2014       Problem List:  Patient Active Problem List   Diagnosis    Paroxysmal atrial fibrillation    Mixed hyperlipidemia    Primary osteoarthritis of both knees    Degeneration of lumbar or lumbosacral intervertebral disc    Toenail fungus    Bipolar affective disorder    Depression    Attention deficit hyperactivity disorder (ADHD)       Allergy:   Allergies   Allergen Reactions    Viloxazine Hcl Er Mental Status Change     Suicidal thoughts        Current Medications:   Current Outpatient Medications   Medication Sig Dispense Refill    FLUoxetine (PROzac) 40 MG capsule Take 1 capsule by mouth Daily. 90 capsule 0    lamoTRIgine  MG tablet sustained-release 24 hour Take 1 tablet by mouth Daily. 90 tablet 0    amphetamine-dextroamphetamine XR (ADDERALL XR) 25 MG 24 hr capsule Take 1 capsule by mouth Daily 30 capsule 0    atorvastatin (LIPITOR) 20 MG tablet TAKE 1 TABLET BY MOUTH EVERY DAY IN THE EVENING 90 tablet 3    tiZANidine (ZANAFLEX) 4 MG tablet TAKE 1 TABLET BY MOUTH EVERY 8 HOURS AS NEEDED FOR MUSCLE SPASMS. 270 tablet 0     No current facility-administered medications for this visit.       Review of Systems:    Review of Systems   Constitutional:  Positive for fatigue.   HENT: Negative.     Eyes: Negative.    Respiratory: Negative.     Cardiovascular: Negative.    Gastrointestinal: Negative.    Endocrine: Negative.    Genitourinary: Negative.         Kidney stone   Musculoskeletal:  Positive for back pain.   Skin: Negative.    Allergic/Immunologic: Negative.    Neurological: Negative.  Negative for seizures.   Hematological: Negative.    Psychiatric/Behavioral:  Positive for  decreased concentration and stress.          Physical Exam:   Physical Exam  Constitutional:       Appearance: Normal appearance. He is normal weight.   HENT:      Head: Normocephalic.      Nose: Nose normal.   Pulmonary:      Effort: Pulmonary effort is normal.   Musculoskeletal:         General: Normal range of motion.      Cervical back: Normal range of motion.   Neurological:      General: No focal deficit present.      Mental Status: He is alert and oriented to person, place, and time. Mental status is at baseline.   Psychiatric:         Attention and Perception: Attention and perception normal.         Mood and Affect: Mood and affect normal.         Speech: Speech normal.         Behavior: Behavior normal. Behavior is cooperative.         Thought Content: Thought content normal.         Cognition and Memory: Cognition and memory normal. Memory is impaired.         Judgment: Judgment normal.     Vitals:  There were no vitals taken for this visit. There is no height or weight on file to calculate BMI.  Due to extenuating circumstances and possible current health risks associated with the patient being present in a clinical setting (with current health restrictions in place in regards to possible COVID 19 transmission/exposure), the patient was seen remotely today via a MyChart Video Visit through [employer].  Unable to obtain vital signs due to nature of remote visit.  Height stated at 6ft1 inches.  Weight stated at 183 pounds.    Last 3 Blood Pressure Readings:  BP Readings from Last 3 Encounters:   03/11/25 110/70   10/17/24 108/70   07/19/24 110/70     PHQ-9 Depression Screening  Little interest or pleasure in doing things? (Patient-Rptd) Not at all   Feeling down, depressed, or hopeless? (Patient-Rptd) Not at all   PHQ-2 Total Score (Patient-Rptd) 0   Trouble falling or staying asleep, or sleeping too much? (Patient-Rptd) Not at all   Feeling tired or having little energy? (Patient-Rptd) Not at all   Poor  appetite or overeating? (Patient-Rptd) Not at all   Feeling bad about yourself - or that you are a failure or have let yourself or your family down? (Patient-Rptd) Not at all   Trouble concentrating on things, such as reading the newspaper or watching television? (Patient-Rptd) Not at all   Moving or speaking so slowly that other people could have noticed? Or the opposite - being so fidgety or restless that you have been moving around a lot more than usual? (Patient-Rptd) Not at all   Thoughts that you would be better off dead, or of hurting yourself in some way? (Patient-Rptd) Not at all   PHQ-9 Total Score (Patient-Rptd) 0   If you checked off any problems, how difficult have these problems made it for you to do your work, take care of things at home, or get along with other people? (Patient-Rptd) Not difficult at all     JACQUELINE: 0    Mental Status Exam:   Hygiene:   good  Cooperation:  Cooperative  Eye Contact:  Good  Psychomotor Behavior:  Appropriate  Affect:  Appropriate  Mood: normal  Hopelessness: Denies  Speech:  Normal  Thought Process:  Goal directed  Thought Content:  Normal  Suicidal:  None  Homicidal:  None  Hallucinations:  None  Delusion:  None  Memory:  Intact  Orientation:  Grossly intact  Reliability:  good  Insight:  Good  Judgement:  Fair  Impulse Control:  Fair  Physical/Medical Issues:   back surgeries, knee surgeries, cardiac ablation 2019        Lab Results:   Office Visit on 03/11/2025   Component Date Value Ref Range Status    WBC 03/11/2025 7.15  3.40 - 10.80 10*3/mm3 Final    RBC 03/11/2025 5.10  4.14 - 5.80 10*6/mm3 Final    Hemoglobin 03/11/2025 15.2  13.0 - 17.7 g/dL Final    Hematocrit 03/11/2025 45.6  37.5 - 51.0 % Final    MCV 03/11/2025 89.4  79.0 - 97.0 fL Final    MCH 03/11/2025 29.8  26.6 - 33.0 pg Final    MCHC 03/11/2025 33.3  31.5 - 35.7 g/dL Final    RDW 03/11/2025 12.9  12.3 - 15.4 % Final    RDW-SD 03/11/2025 42.7  37.0 - 54.0 fl Final    MPV 03/11/2025 11.0  6.0 - 12.0 fL  Final    Platelets 03/11/2025 204  140 - 450 10*3/mm3 Final    Glucose 03/11/2025 92  65 - 99 mg/dL Final    BUN 03/11/2025 16  6 - 20 mg/dL Final    Creatinine 03/11/2025 1.04  0.76 - 1.27 mg/dL Final    Sodium 03/11/2025 140  136 - 145 mmol/L Final    Potassium 03/11/2025 4.6  3.5 - 5.2 mmol/L Final    Chloride 03/11/2025 104  98 - 107 mmol/L Final    CO2 03/11/2025 25.7  22.0 - 29.0 mmol/L Final    Calcium 03/11/2025 9.1  8.6 - 10.5 mg/dL Final    Total Protein 03/11/2025 6.9  6.0 - 8.5 g/dL Final    Albumin 03/11/2025 3.9  3.5 - 5.2 g/dL Final    ALT (SGPT) 03/11/2025 37  1 - 41 U/L Final    AST (SGOT) 03/11/2025 30  1 - 40 U/L Final    Alkaline Phosphatase 03/11/2025 86  39 - 117 U/L Final    Total Bilirubin 03/11/2025 0.3  0.0 - 1.2 mg/dL Final    Globulin 03/11/2025 3.0  gm/dL Final    A/G Ratio 03/11/2025 1.3  g/dL Final    BUN/Creatinine Ratio 03/11/2025 15.4  7.0 - 25.0 Final    Anion Gap 03/11/2025 10.3  5.0 - 15.0 mmol/L Final    eGFR 03/11/2025 86.9  >60.0 mL/min/1.73 Final    Total Cholesterol 03/11/2025 138  0 - 200 mg/dL Final    Triglycerides 03/11/2025 62  0 - 150 mg/dL Final    HDL Cholesterol 03/11/2025 49  40 - 60 mg/dL Final    LDL Cholesterol  03/11/2025 76  0 - 100 mg/dL Final    VLDL Cholesterol 03/11/2025 13  5 - 40 mg/dL Final    LDL/HDL Ratio 03/11/2025 1.56   Final    TSH 03/11/2025 3.140  0.270 - 4.200 uIU/mL Final    PSA 03/11/2025 0.898  0.000 - 4.000 ng/mL Final    Vitamin B-12 03/11/2025 409  211 - 946 pg/mL Final    Color 03/11/2025 Yellow  Yellow, Straw, Dark Yellow, Tali Final    Clarity, UA 03/11/2025 Clear  Clear Final    Specific Gravity  03/11/2025 1.015  1.005 - 1.030 Final    pH, Urine 03/11/2025 6.0  5.0 - 8.0 Final    Leukocytes 03/11/2025 Negative  Negative Final    Nitrite, UA 03/11/2025 Negative  Negative Final    Protein, POC 03/11/2025 Negative  Negative mg/dL Final    Glucose, UA 03/11/2025 Negative  Negative mg/dL Final    Ketones, UA 03/11/2025 Negative  Negative  Final    Urobilinogen, UA 03/11/2025 Normal  Normal, 0.2 E.U./dL Final    Bilirubin 03/11/2025 Negative  Negative Final    Blood, UA 03/11/2025 Negative  Negative Final    Lot Number 03/11/2025 [id number]   Final    Expiration Date 03/11/2025 1,[id number]   Final   Office Visit on 10/17/2024   Component Date Value Ref Range Status    Glucose 10/17/2024 88  65 - 99 mg/dL Final    BUN 10/17/2024 16  6 - 20 mg/dL Final    Creatinine 10/17/2024 1.16  0.76 - 1.27 mg/dL Final    Sodium 10/17/2024 139  136 - 145 mmol/L Final    Potassium 10/17/2024 4.3  3.5 - 5.2 mmol/L Final    Chloride 10/17/2024 104  98 - 107 mmol/L Final    CO2 10/17/2024 28.8  22.0 - 29.0 mmol/L Final    Calcium 10/17/2024 9.8  8.6 - 10.5 mg/dL Final    BUN/Creatinine Ratio 10/17/2024 13.8  7.0 - 25.0 Final    Anion Gap 10/17/2024 6.2  5.0 - 15.0 mmol/L Final    eGFR 10/17/2024 76.7  >60.0 mL/min/1.73 Final    TSH 10/17/2024 3.670  0.270 - 4.200 uIU/mL Final   Lab on 10/11/2024   Component Date Value Ref Range Status    THC, Screen, Urine 10/11/2024 Negative  Negative Final    Phencyclidine (PCP), Urine 10/11/2024 Negative  Negative Final    Cocaine Screen, Urine 10/11/2024 Negative  Negative Final    Methamphetamine, Ur 10/11/2024 Negative  Negative Final    Opiate Screen 10/11/2024 Negative  Negative Final    Amphetamine Screen, Urine 10/11/2024 Positive (A)  Negative Final    Benzodiazepine Screen, Urine 10/11/2024 Negative  Negative Final    Tricyclic Antidepressants Screen 10/11/2024 Negative  Negative Final    Methadone Screen, Urine 10/11/2024 Negative  Negative Final    Barbiturates Screen, Urine 10/11/2024 Negative  Negative Final    Oxycodone Screen, Urine 10/11/2024 Negative  Negative Final    Buprenorphine, Screen, Urine 10/11/2024 Negative  Negative Final    Fentanyl, Urine 10/11/2024 Negative  Negative Final         Assessment & Plan   Problems Addressed this Visit          Mental Health    Attention deficit hyperactivity disorder (ADHD) -  Primary    Relevant Medications    amphetamine-dextroamphetamine XR (ADDERALL XR) 25 MG 24 hr capsule    FLUoxetine (PROzac) 40 MG capsule     Other Visit Diagnoses         Seasonal depression        Relevant Medications    amphetamine-dextroamphetamine XR (ADDERALL XR) 25 MG 24 hr capsule    FLUoxetine (PROzac) 40 MG capsule      Bipolar affective disorder, mixed        Relevant Medications    amphetamine-dextroamphetamine XR (ADDERALL XR) 25 MG 24 hr capsule    FLUoxetine (PROzac) 40 MG capsule    lamoTRIgine  MG tablet sustained-release 24 hour      Medication management        Relevant Medications    amphetamine-dextroamphetamine XR (ADDERALL XR) 25 MG 24 hr capsule    FLUoxetine (PROzac) 40 MG capsule    lamoTRIgine  MG tablet sustained-release 24 hour          Diagnoses         Codes Comments      Attention deficit hyperactivity disorder (ADHD), combined type    -  Primary ICD-10-CM: F90.2  ICD-9-CM: 314.01       Seasonal depression     ICD-10-CM: F33.8  ICD-9-CM: 296.99       Bipolar affective disorder, mixed     ICD-10-CM: F31.60  ICD-9-CM: 296.60       Medication management     ICD-10-CM: Z79.899  ICD-9-CM: V58.69             Visit Diagnoses:    ICD-10-CM ICD-9-CM   1. Attention deficit hyperactivity disorder (ADHD), combined type  F90.2 314.01   2. Seasonal depression  F33.8 296.99   3. Bipolar affective disorder, mixed  F31.60 296.60   4. Medication management  Z79.899 V58.69           Juan David recently reviewed and appropriate per report #[id number]  UDS current.  Adderall increased to 25 XR daily.  Continue Prozac and Lamictal, refills sent.  Previously educated upon side effects with use of these medications as well as when to present for emergency services, denies at this time.  Follow up with this provider in 4 weeks or sooner if needed.    Risks, benefits, alternatives discussed with patient including GI upset, nausea vomiting diarrhea, theoretical decrease of seizure threshold predisposing  the patient to a slightly higher seizure risk, headaches, sexual dysfunction, serotonin syndrome, bleeding risk, increased suicidality in patients 24 years and younger, switching to lesa/hypomania.  After discussion of these risks and benefits, the patient voiced understanding and agreed to proceed.       The patient is being prescribed a controlled substance as part of the treatment plan. The patient has been educated of appropriate use of the medication(s), including risks and side effects such as insomnia, headache, exacerbation of tics, nervousness, irritability, overstimulation, tremor, dizziness, anorexia or change in appetite, nausea, dry mouth, constipation, diarrhea, weight loss, sexual dysfunction, psychotic episodes, seizures, palpitations, tachycardia, hypertension, rare activation (activation of hypomania, lesa, and/or suicidal ideations), cardiovascular adverse effects including sudden death (especially patients with pre-existing structural abnormalities often associated with a family history of cardiac disease), may slow normal growth in children, weight gain is reported but not expected, sedation is possible but activation is much more common, metabolic adversities, the risk of tolerance and dependence, and overdose among others. Without the prescribed medication for ADHD, it is reported that the patient has problems with attention and focus including being easily distracted, easily losing objects, trouble with time management, trouble completing tasks because of distractions, procrastination, indecisiveness, careless mistakes in daily activities/work, and not finishing jobs that are started. The goals and objectives with treatment have been discussed including management of reported symptoms of ADHD, to minimize the impact of ADHD symptoms on patient function while maximizing the patient's ability to compensate or cope with any remaining difficulties, and to assist the patient with being  successful and productive in their life/daily pursuits. The patient denies any side effects, no cardiovascular symptoms including palpitations or hypertension, no development or worsening of insomnia, and no development or worsening of anxiety symptoms on the medication. Due to the reported problems without the medication usage, as well as the reported significant improvement in symptoms with the medication usage, the patient requests to remain on the prescribed medication for ADHD and does not feel tapering or coming off of medication at this time is appropriate. The reported symptoms of ADHD, any reported resolution of symptoms, and the necessity and appropriateness for continued treatment with a controlled substance will be reevaluated at each encounter. Patient is also informed that the medication is to be used by the patient only, the medication is to be used only as directed, and the medication should not be combined with other substances, OTC or prescription, unless directed by a Provider/Prescriber. The patient verbalized understanding and agreement with this in their own words, and wish to continue with the current treatment plan.       GOALS:  Short Term Goals: Patient will be compliant with medication, and patient will have no significant medication related side effects.  Patient will be engaged in psychotherapy as indicated.  Patient will report subjective improvement of symptoms.  Long term goals: To stabilize mood and treat/improve subjective symptoms, the patient will stay out of the hospital, the patient will be at an optimal level of functioning, and the patient will take all medications as prescribed.  The patient/guardian verbalized understanding and agreement with goals that were mutually set.      TREATMENT PLAN: Continue supportive psychotherapy efforts and medications as indicated.  Pharmacological and Non-Pharmacological treatment options discussed during today's visit. Patient/Guardian  acknowledged and verbally consented with current treatment plan and was educated on the importance of compliance with treatment and follow-up appointments.      MEDICATION ISSUES:  Discussed medication options and treatment plan of prescribed medication as well as the risks, benefits, any black box warnings, and side effects including potential falls, possible impaired driving, and metabolic adversities among others. Patient is agreeable to call the office with any worsening of symptoms or onset of side effects, or if any concerns or questions arise.  The contact information for the office is made available to the patient. Patient is agreeable to call 911 or go to the nearest ER should they begin having any SI/HI, or if any urgent concerns arise. No medication side effects or related complaints today.     MEDS ORDERED DURING VISIT:  New Medications Ordered This Visit   Medications    amphetamine-dextroamphetamine XR (ADDERALL XR) 25 MG 24 hr capsule     Sig: Take 1 capsule by mouth Daily     Dispense:  30 capsule     Refill:  0    FLUoxetine (PROzac) 40 MG capsule     Sig: Take 1 capsule by mouth Daily.     Dispense:  90 capsule     Refill:  0    lamoTRIgine  MG tablet sustained-release 24 hour     Sig: Take 1 tablet by mouth Daily.     Dispense:  90 tablet     Refill:  0       Follow Up Appointment:   Return in about 4 weeks (around 4/9/2025) for Recheck.             This document has been electronically signed by DEVEN Murillo  March 12, 2025 13:06 EDT    Some of the data in this electronic note has been brought forward from a previous encounter, any necessary changes have been made, it has been reviewed by this APRN, and it is accurate.    Dictated Utilizing Dragon Dictation: Part of this note may be an electronic transcription/translation of spoken language to printed text using the Dragon Dictation System.

## 2025-03-21 ENCOUNTER — PRIOR AUTHORIZATION (OUTPATIENT)
Dept: PSYCHIATRY | Facility: CLINIC | Age: 50
End: 2025-03-21
Payer: COMMERCIAL

## 2025-04-04 ENCOUNTER — OFFICE VISIT (OUTPATIENT)
Dept: CARDIOLOGY | Facility: CLINIC | Age: 50
End: 2025-04-04
Payer: COMMERCIAL

## 2025-04-04 VITALS
BODY MASS INDEX: 24.86 KG/M2 | DIASTOLIC BLOOD PRESSURE: 68 MMHG | HEART RATE: 78 BPM | HEIGHT: 73 IN | OXYGEN SATURATION: 98 % | SYSTOLIC BLOOD PRESSURE: 110 MMHG | WEIGHT: 187.6 LBS

## 2025-04-04 DIAGNOSIS — E78.2 MIXED HYPERLIPIDEMIA: ICD-10-CM

## 2025-04-04 DIAGNOSIS — I48.0 PAROXYSMAL ATRIAL FIBRILLATION: Primary | ICD-10-CM

## 2025-04-04 PROCEDURE — 99203 OFFICE O/P NEW LOW 30 MIN: CPT | Performed by: INTERNAL MEDICINE

## 2025-04-04 PROCEDURE — 93000 ELECTROCARDIOGRAM COMPLETE: CPT | Performed by: INTERNAL MEDICINE

## 2025-04-04 NOTE — PROGRESS NOTES
Electrophysiology Clinic Consult     Ronaldo Cantu  1975  [unfilled]  [unfilled]    04/04/25    DATE OF ADMISSION: (Not on file)  [facility] CARDIOLOGY    Chester Mays MD  [address] / [facility] [address]  Referring Provider: Referring, Self     Chief Complaint   Patient presents with    Atrial Fibrillation     Problem list:    1.  Atrial fibrillation   A.  Pulmonary vein ablation/atrial flutter ablation Dr. Sawyer 11/27/2019 database incomplete  B.  Echocardiogram 4/12/2024 LVEF normal at 56 to 60%, no significant valvular heart disease.  2.  CP  A.  Cardiac CTA 11/12/2019 normal coronary arteries with LVEF 54%.  3.  Hyperlipidemia  4.  Depression/bipolar affective disorder/attention deficit hyperactivity disorder  5.  Kidney stones with hydronephrosis  6.  Surgeries   A.  Knee surgery   B.  Cervical fusion    History of Present Illness:     49-year-old white male with a history of paroxysmal atrial fibrillation and hyperlipidemia who underwent pulmonary vein ablation in 2019.  Since that time, he has done very well in regards to his atrial fibrillation.  He has had no sustained arrhythmias, chest pain, lightheadedness, near-syncope, or syncope.  No dyspnea on exertion.  He is retired due to multiple back and orthopedic surgeries that he had in the past he is presently awaiting right shoulder surgery in May 16, 2025 and is also asking for medical clearance today.  In regards to his hyperlipidemia, he is being treated medically and is followed with his primary care physician.  He did have a remote cardiac CT angiogram in 2019 that demonstrated normal coronary arteries.  Otherwise the patient has done clinically very well.  Denies any substantial alcohol use, tobacco abuse, over-the-counter medications, stimulant use, or recreational drug use.  Blood pressures at home when he does check them have been stable.  No history of CAD.  Patient states that he has had an  echocardiogram within the last few years at [facility].  He would like to switch his care here since he now lives in this area and is difficult for him to get back to [place].    Allergies   Allergen Reactions    Viloxazine Hcl Er Mental Status Change     Suicidal thoughts        Cannot display prior to admission medications because the patient has not been admitted in this contact.              Current Outpatient Medications:     amphetamine-dextroamphetamine XR (ADDERALL XR) 25 MG 24 hr capsule, Take 1 capsule by mouth Daily, Disp: 30 capsule, Rfl: 0    atorvastatin (LIPITOR) 20 MG tablet, TAKE 1 TABLET BY MOUTH EVERY DAY IN THE EVENING, Disp: 90 tablet, Rfl: 3    benzoyl peroxide 5 % external wash, Use as directed by Dr. James, Disp: 148 g, Rfl: 0    FLUoxetine (PROzac) 40 MG capsule, Take 1 capsule by mouth Daily., Disp: 90 capsule, Rfl: 0    lamoTRIgine  MG tablet sustained-release 24 hour, Take 1 tablet by mouth Daily., Disp: 90 tablet, Rfl: 0    mupirocin (BACTROBAN) 2 % ointment, Place 1 application in each nostril as directed twice daily, beginning 5 days before surgery, Disp: 22 g, Rfl: 0    ondansetron (ZOFRAN) 4 MG tablet, Take 1 tablet by mouth Every 8 (Eight) Hours As Needed for post-op nausea., Disp: 30 tablet, Rfl: 0    tiZANidine (ZANAFLEX) 4 MG tablet, TAKE 1 TABLET BY MOUTH EVERY 8 HOURS AS NEEDED FOR MUSCLE SPASMS., Disp: 270 tablet, Rfl: 0    Social History     Socioeconomic History    Marital status:     Number of children: 3   Tobacco Use    Smoking status: Never    Smokeless tobacco: Never   Vaping Use    Vaping status: Never Used   Substance and Sexual Activity    Alcohol use: Yes     Alcohol/week: 1.0 standard drink of alcohol     Types: 1 Cans of beer per week     Comment: 1 per day    Drug use: Never    Sexual activity: Yes     Partners: Female     Birth control/protection: Vasectomy       Family History   Problem Relation Age of Onset    Heart attack Mother  "    Suicide Attempts Sister     Suicide Attempts Sister     Cancer Maternal Grandmother     Lung cancer Paternal Grandfather        REVIEW OF SYSTEMS:   CONST:  No weight loss, fever, chills, weakness + fatigue.   HEENT:  No visual loss, blurred vision, double vision, yellow sclerae.                   No hearing loss, congestion, sore throat.   SKIN:      No rashes, urticaria, ulcers, sores.     RESP:     No shortness of breath, hemoptysis, cough, sputum.   GI:           No anorexia, nausea, vomiting, diarrhea. No abdominal pain, melena.   :         No burning on urination, hematuria or increased frequency.  ENDO:    No diaphoresis, cold or heat intolerance. No polyuria or polydipsia.   NEURO:  No headache, dizziness, syncope, paralysis, ataxia, or parasthesias.                  No change in bowel or bladder control. No history of CVA/TIA  MUSC:    + muscle, back pain, joint pain, stiffness.   HEME:    No anemia, bleeding, bruising. No history of DVT/PE.  PSYCH:  + history of depression, anxiety    Vitals:    04/04/25 1146   BP: 110/68   BP Location: Right arm   Patient Position: Sitting   Pulse: 78   SpO2: 98%   Weight: 85.1 kg (187 lb 9.6 oz)   Height: 185.4 cm (73\")                 Physical Exam:  GEN: Well nourished, well-developed, no acute distress  HEENT: Normocephalic, atraumatic, PERRLA, moist mucous membranes  NECK: Supple, NO JVD, no thyromegaly, no lymphadenopathy  CARD: Regular rate rhythm normal S1-S2.  There is no S3 or S4.  No murmurs are appreciated.  LUNGS: Clear to auscultation, normal respiratory effort  ABDOMEN: Soft, nontender, normal bowel sounds  EXTREMITIES: No gross deformities, no clubbing, cyanosis, or edema  SKIN: Warm, dry  NEURO: No focal deficits, alert and oriented x 3  PSYCHIATRIC: Normal affect and mood      I personally viewed and interpreted the patient's EKG/Telemetry/lab data    Lab Results   Component Value Date    GLUCOSE 92 03/11/2025    CALCIUM 9.1 03/11/2025     " "03/11/2025    K 4.6 03/11/2025    CO2 25.7 03/11/2025     03/11/2025    BUN 16 03/11/2025    CREATININE 1.04 03/11/2025    BCR 15.4 03/11/2025    ANIONGAP 10.3 03/11/2025     Lab Results   Component Value Date    WBC 7.15 03/11/2025    HGB 15.2 03/11/2025    HCT 45.6 03/11/2025    MCV 89.4 03/11/2025     03/11/2025     No results found for: \"INR\", \"PROTIME\"  Lab Results   Component Value Date    TSH 3.140 03/11/2025                ECG 12 Lead    Date/Time: 4/4/2025 12:25 PM  Performed by: Filiberto Spence MD    Authorized by: Filiberto Spence MD  Comparison: not compared with previous ECG   Previous ECG: no previous ECG available  Rhythm: sinus rhythm  BPM: 80            ICD-10-CM ICD-9-CM   1. Paroxysmal atrial fibrillation  I48.0 427.31   2. Mixed hyperlipidemia  E78.2 272.2       Assessment and Plan:     Paroxysmal atrial fibrillation status post pulmonary vein ablation 2019 with no clinical recurrence and doing well.  Encouraged him to take aspirin 81 mg daily.  Will try to get all the records from [place] in the past.  Hyperlipidemia: Continue to follow-up with his primary doctor for management of his medical therapy.  Upcoming right shoulder surgery.  Will follow-up on his echocardiogram results that he had in the last few years as well as serial EKGs done previously.  Cardiac CT angio in 2019 was within normal limits.    Return to clinic and see us in 2 years or sooner.        "

## 2025-04-10 ENCOUNTER — TELEMEDICINE (OUTPATIENT)
Dept: PSYCHIATRY | Facility: CLINIC | Age: 50
End: 2025-04-10
Payer: COMMERCIAL

## 2025-04-10 DIAGNOSIS — F31.60 BIPOLAR AFFECTIVE DISORDER, MIXED: Primary | ICD-10-CM

## 2025-04-10 DIAGNOSIS — Z79.899 MEDICATION MANAGEMENT: ICD-10-CM

## 2025-04-10 DIAGNOSIS — F33.8 SEASONAL DEPRESSION: ICD-10-CM

## 2025-04-10 DIAGNOSIS — F90.2 ATTENTION DEFICIT HYPERACTIVITY DISORDER (ADHD), COMBINED TYPE: ICD-10-CM

## 2025-04-10 RX ORDER — DEXTROAMPHETAMINE SACCHARATE, AMPHETAMINE ASPARTATE MONOHYDRATE, DEXTROAMPHETAMINE SULFATE AND AMPHETAMINE SULFATE 6.25; 6.25; 6.25; 6.25 MG/1; MG/1; MG/1; MG/1
25 CAPSULE, EXTENDED RELEASE ORAL DAILY
Qty: 30 CAPSULE | Refills: 0 | Status: SHIPPED | OUTPATIENT
Start: 2025-04-10

## 2025-04-10 NOTE — PROGRESS NOTES
"This provider is located at the [facility] (through [facility]), [address], [facility], [address] using a secure video visit through [employer]. Patient is being seen remotely via telehealth at their home address in [place], and stated they are in a secure environment for this session. The patient's condition being consulted/diagnosed/treated is appropriate for telemedicine. The provider identified herself as well as her credentials.   The patient, and/or patients guardian, consent to be seen remotely, and when consent is given they understand that the consent allows for patient identifiable information to be sent to a third party as needed. They may refuse to be seen remotely at any time. The electronic data is encrypted and password protected, and the patient and/or guardian has been advised of the potential risks to privacy not withstanding such measures.   The use of a video visit has been reviewed with the patient and verbal informed consent has been obtained.   Patient identifiers used: Name and .      Subjective   Ronaldo Cantu is a 49 y.o. male who presents today for follow up    Chief Complaint:    Chief Complaint   Patient presents with    Anxiety    ADHD    Depression    Irritable    Med Management        History of Present Illness:   History of Present Illness  Patient is a 49-year-old male presenting for 1 month follow-up related to depression, anxiety, irritability and ADHD.  He voices compliance with medications, denies side effects.  Denies cardiac symptoms with use of Adderall, was recently assessed by cardiology, received surgery clearance for upcoming shoulder surgery next month.  He states an EKG was performed that was normal.  Denies jaw clenching.  Has noticed improvement with increase to Adderall \"Zoroastrian is completely different, I can listen.  Before I was going to Zoroastrian and lalala now I cannot completely focus.\"  Also no longer experiencing " "fatigue as before.  Sleep disrupted due to current shoulder pain.  PHQ and JACQUELINE scoring minimal for depression and anxiety.  When asked about depression \"none.\"  As about anxiety \"no anxiety.\"  Denies seasonal depression.  States \"I sleep but pain is bad.  I am tired.\"  Does experience some intermittent headaches \"but I had rather be with that than the opposite.\"  Appetite \"no changes, everything is the same.\"  Regarding mood status/agitation \"I do not have any.\"  Denies symptoms consistent with lesa.  Denies SI, HI, SIB, or loose nations currently and is convincing.    Patient resides in a home with his wife of 8 years whom he cites as supportive.  He has 1 biological child and 2 stepchildren, all adults.  He has a college degree in biology and is now retired.  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  This has been stressful.  Also some financial stress, stress surrounding disability.  Jew Mormonism preference.  Enjoys golfing.  Denies drug or alcohol use.  Some stress surrounding disability case.  Now filing an appeal.  Has been busy working on home projects.       The following portions of the patient's history were reviewed and updated as appropriate: allergies, current medications, past family history, past medical history, past social history, past surgical history and problem list.    Past Psychiatric History:  Began Treatment: 7 years ago for anger management  Diagnoses: unsure  Psychiatrist:Denies  Therapist: previously  Admission History:Denies  Medication Trials: effexor straterra (more irritable) and quelbree( suicidal), clonidine (increased thirst), wellbutrin (made worse), vyvanse (side effects-jaw clenching), adderall 7.5 BID (wore off), buspar 10 (headaches/sedating)  Self Harm: Denies  Suicide Attempts:Denies   Psychosis, Anxiety, Depression:  N/A    Past Medical History:  Past Medical History:   Diagnosis Date    ADHD (attention deficit hyperactivity disorder) 2021    Sriinvasa always known srinivasa " had a problem with focusing and paying attention and it progressively was getting worse    Chronic pain disorder 2010    Chronic back pain    Depression Danny 3    On 40mg of Prozac    Headache January 2022    Started happening after COVID booster    Low back pain 20 years ago    Nerve ablation September 2022    Nephrolithiasis     PTSD (post-traumatic stress disorder) November 4, 2023    Severe Right knee pain knowing its bone on bone but terrified to have surgery again       Substance Abuse History:   Types:Denies all, including illicit  Withdrawal Symptoms:Denies  Longest Period Sober:Not Applicable   AA: Not applicable     Social History:  Social History     Socioeconomic History    Marital status:     Number of children: 3   Tobacco Use    Smoking status: Never    Smokeless tobacco: Never   Vaping Use    Vaping status: Never Used   Substance and Sexual Activity    Alcohol use: Yes     Alcohol/week: 1.0 standard drink of alcohol     Types: 1 Cans of beer per week     Comment: 1 per day    Drug use: Never    Sexual activity: Yes     Partners: Female     Birth control/protection: Vasectomy       Family History:  Family History   Problem Relation Age of Onset    Heart attack Mother     Suicide Attempts Sister     Suicide Attempts Sister     Cancer Maternal Grandmother     Lung cancer Paternal Grandfather        Past Surgical History:  Past Surgical History:   Procedure Laterality Date    APPENDECTOMY  09/2010    CARDIAC ABLATION  11/2019    CARDIAC SURGERY  Nov 2019    Heart ablation    CERVICAL FUSION  10/2012    COLONOSCOPY  11/2021    JOINT REPLACEMENT  Dec 1 2022    Partial knee replacement Left knee    KNEE ARTHROPLASTY, PARTIAL REPLACEMENT  12/2022    KNEE ARTHROSCOPY  05/2021    SHOULDER ARTHROSCOPY      VASECTOMY  Dec 2014       Problem List:  Patient Active Problem List   Diagnosis    Paroxysmal atrial fibrillation    Mixed hyperlipidemia    Primary osteoarthritis of both knees    Degeneration of  lumbar or lumbosacral intervertebral disc    Toenail fungus    Bipolar affective disorder    Depression    Attention deficit hyperactivity disorder (ADHD)       Allergy:   Allergies   Allergen Reactions    Viloxazine Hcl Er Mental Status Change     Suicidal thoughts        Current Medications:   Current Outpatient Medications   Medication Sig Dispense Refill    amphetamine-dextroamphetamine XR (ADDERALL XR) 25 MG 24 hr capsule Take 1 capsule by mouth Daily 30 capsule 0    atorvastatin (LIPITOR) 20 MG tablet TAKE 1 TABLET BY MOUTH EVERY DAY IN THE EVENING 90 tablet 3    benzoyl peroxide 5 % external wash Use as directed by Dr. James 148 g 0    FLUoxetine (PROzac) 40 MG capsule Take 1 capsule by mouth Daily. 90 capsule 0    lamoTRIgine  MG tablet sustained-release 24 hour Take 1 tablet by mouth Daily. 90 tablet 0    mupirocin (BACTROBAN) 2 % ointment Place 1 application in each nostril as directed twice daily, beginning 5 days before surgery 22 g 0    ondansetron (ZOFRAN) 4 MG tablet Take 1 tablet by mouth Every 8 (Eight) Hours As Needed for post-op nausea. 30 tablet 0    tiZANidine (ZANAFLEX) 4 MG tablet TAKE 1 TABLET BY MOUTH EVERY 8 HOURS AS NEEDED FOR MUSCLE SPASMS. 270 tablet 0     No current facility-administered medications for this visit.       Review of Systems:    Review of Systems   Constitutional:  Positive for fatigue.   HENT: Negative.     Eyes: Negative.    Respiratory: Negative.     Cardiovascular: Negative.    Gastrointestinal: Negative.    Endocrine: Negative.    Genitourinary: Negative.         Kidney stone   Musculoskeletal:  Positive for back pain.   Skin: Negative.    Allergic/Immunologic: Negative.    Neurological: Negative.  Negative for seizures.   Hematological: Negative.    Psychiatric/Behavioral:  Positive for decreased concentration and stress.          Physical Exam:   Physical Exam  Constitutional:       Appearance: Normal appearance. He is normal weight.   HENT:      Head:  Normocephalic.      Nose: Nose normal.   Pulmonary:      Effort: Pulmonary effort is normal.   Musculoskeletal:         General: Normal range of motion.      Cervical back: Normal range of motion.   Neurological:      General: No focal deficit present.      Mental Status: He is alert and oriented to person, place, and time. Mental status is at baseline.   Psychiatric:         Attention and Perception: Attention and perception normal.         Mood and Affect: Mood and affect normal.         Speech: Speech normal.         Behavior: Behavior normal. Behavior is cooperative.         Thought Content: Thought content normal.         Cognition and Memory: Cognition and memory normal. Memory is impaired.         Judgment: Judgment normal.     Vitals:  There were no vitals taken for this visit. There is no height or weight on file to calculate BMI.  Due to extenuating circumstances and possible current health risks associated with the patient being present in a clinical setting (with current health restrictions in place in regards to possible COVID 19 transmission/exposure), the patient was seen remotely today via a MyChart Video Visit through [employer].  Unable to obtain vital signs due to nature of remote visit.  Height stated at 6ft1 inches.  Weight stated at 183 pounds.    Last 3 Blood Pressure Readings:  BP Readings from Last 3 Encounters:   04/04/25 110/68   03/11/25 110/70   10/17/24 108/70     PHQ-9 Depression Screening  Little interest or pleasure in doing things? (Patient-Rptd) Not at all   Feeling down, depressed, or hopeless? (Patient-Rptd) Not at all   PHQ-2 Total Score (Patient-Rptd) 0   Trouble falling or staying asleep, or sleeping too much? (Patient-Rptd) Not at all   Feeling tired or having little energy? (Patient-Rptd) Not at all   Poor appetite or overeating? (Patient-Rptd) Not at all   Feeling bad about yourself - or that you are a failure or have let yourself or your family down? (Patient-Rptd) Not at all    Trouble concentrating on things, such as reading the newspaper or watching television? (Patient-Rptd) Not at all   Moving or speaking so slowly that other people could have noticed? Or the opposite - being so fidgety or restless that you have been moving around a lot more than usual? (Patient-Rptd) Not at all   Thoughts that you would be better off dead, or of hurting yourself in some way? (Patient-Rptd) Not at all   PHQ-9 Total Score (Patient-Rptd) 0   If you checked off any problems, how difficult have these problems made it for you to do your work, take care of things at home, or get along with other people? (Patient-Rptd) Not difficult at all     JACQUELINE: 0    Mental Status Exam:   Hygiene:   good  Cooperation:  Cooperative  Eye Contact:  Good  Psychomotor Behavior:  Appropriate  Affect:  Appropriate  Mood: normal  Hopelessness: Denies  Speech:  Normal  Thought Process:  Goal directed  Thought Content:  Normal  Suicidal:  None  Homicidal:  None  Hallucinations:  None  Delusion:  None  Memory:  Intact  Orientation:  Grossly intact  Reliability:  good  Insight:  Good  Judgement:  Fair  Impulse Control:  Fair  Physical/Medical Issues:   back surgeries, knee surgeries, cardiac ablation 2019        Lab Results:   Office Visit on 03/11/2025   Component Date Value Ref Range Status    WBC 03/11/2025 7.15  3.40 - 10.80 10*3/mm3 Final    RBC 03/11/2025 5.10  4.14 - 5.80 10*6/mm3 Final    Hemoglobin 03/11/2025 15.2  13.0 - 17.7 g/dL Final    Hematocrit 03/11/2025 45.6  37.5 - 51.0 % Final    MCV 03/11/2025 89.4  79.0 - 97.0 fL Final    MCH 03/11/2025 29.8  26.6 - 33.0 pg Final    MCHC 03/11/2025 33.3  31.5 - 35.7 g/dL Final    RDW 03/11/2025 12.9  12.3 - 15.4 % Final    RDW-SD 03/11/2025 42.7  37.0 - 54.0 fl Final    MPV 03/11/2025 11.0  6.0 - 12.0 fL Final    Platelets 03/11/2025 204  140 - 450 10*3/mm3 Final    Glucose 03/11/2025 92  65 - 99 mg/dL Final    BUN 03/11/2025 16  6 - 20 mg/dL Final    Creatinine 03/11/2025 1.04   0.76 - 1.27 mg/dL Final    Sodium 03/11/2025 140  136 - 145 mmol/L Final    Potassium 03/11/2025 4.6  3.5 - 5.2 mmol/L Final    Chloride 03/11/2025 104  98 - 107 mmol/L Final    CO2 03/11/2025 25.7  22.0 - 29.0 mmol/L Final    Calcium 03/11/2025 9.1  8.6 - 10.5 mg/dL Final    Total Protein 03/11/2025 6.9  6.0 - 8.5 g/dL Final    Albumin 03/11/2025 3.9  3.5 - 5.2 g/dL Final    ALT (SGPT) 03/11/2025 37  1 - 41 U/L Final    AST (SGOT) 03/11/2025 30  1 - 40 U/L Final    Alkaline Phosphatase 03/11/2025 86  39 - 117 U/L Final    Total Bilirubin 03/11/2025 0.3  0.0 - 1.2 mg/dL Final    Globulin 03/11/2025 3.0  gm/dL Final    A/G Ratio 03/11/2025 1.3  g/dL Final    BUN/Creatinine Ratio 03/11/2025 15.4  7.0 - 25.0 Final    Anion Gap 03/11/2025 10.3  5.0 - 15.0 mmol/L Final    eGFR 03/11/2025 86.9  >60.0 mL/min/1.73 Final    Total Cholesterol 03/11/2025 138  0 - 200 mg/dL Final    Triglycerides 03/11/2025 62  0 - 150 mg/dL Final    HDL Cholesterol 03/11/2025 49  40 - 60 mg/dL Final    LDL Cholesterol  03/11/2025 76  0 - 100 mg/dL Final    VLDL Cholesterol 03/11/2025 13  5 - 40 mg/dL Final    LDL/HDL Ratio 03/11/2025 1.56   Final    TSH 03/11/2025 3.140  0.270 - 4.200 uIU/mL Final    PSA 03/11/2025 0.898  0.000 - 4.000 ng/mL Final    Vitamin B-12 03/11/2025 409  211 - 946 pg/mL Final    Color 03/11/2025 Yellow  Yellow, Straw, Dark Yellow, Tali Final    Clarity, UA 03/11/2025 Clear  Clear Final    Specific Gravity  03/11/2025 1.015  1.005 - 1.030 Final    pH, Urine 03/11/2025 6.0  5.0 - 8.0 Final    Leukocytes 03/11/2025 Negative  Negative Final    Nitrite, UA 03/11/2025 Negative  Negative Final    Protein, POC 03/11/2025 Negative  Negative mg/dL Final    Glucose, UA 03/11/2025 Negative  Negative mg/dL Final    Ketones, UA 03/11/2025 Negative  Negative Final    Urobilinogen, UA 03/11/2025 Normal  Normal, 0.2 E.U./dL Final    Bilirubin 03/11/2025 Negative  Negative Final    Blood, UA 03/11/2025 Negative  Negative Final    Lot  Number 03/11/2025 [id number]   Final    Expiration Date 03/11/2025 1,[id number]   Final   Office Visit on 10/17/2024   Component Date Value Ref Range Status    Glucose 10/17/2024 88  65 - 99 mg/dL Final    BUN 10/17/2024 16  6 - 20 mg/dL Final    Creatinine 10/17/2024 1.16  0.76 - 1.27 mg/dL Final    Sodium 10/17/2024 139  136 - 145 mmol/L Final    Potassium 10/17/2024 4.3  3.5 - 5.2 mmol/L Final    Chloride 10/17/2024 104  98 - 107 mmol/L Final    CO2 10/17/2024 28.8  22.0 - 29.0 mmol/L Final    Calcium 10/17/2024 9.8  8.6 - 10.5 mg/dL Final    BUN/Creatinine Ratio 10/17/2024 13.8  7.0 - 25.0 Final    Anion Gap 10/17/2024 6.2  5.0 - 15.0 mmol/L Final    eGFR 10/17/2024 76.7  >60.0 mL/min/1.73 Final    TSH 10/17/2024 3.670  0.270 - 4.200 uIU/mL Final   Lab on 10/11/2024   Component Date Value Ref Range Status    THC, Screen, Urine 10/11/2024 Negative  Negative Final    Phencyclidine (PCP), Urine 10/11/2024 Negative  Negative Final    Cocaine Screen, Urine 10/11/2024 Negative  Negative Final    Methamphetamine, Ur 10/11/2024 Negative  Negative Final    Opiate Screen 10/11/2024 Negative  Negative Final    Amphetamine Screen, Urine 10/11/2024 Positive (A)  Negative Final    Benzodiazepine Screen, Urine 10/11/2024 Negative  Negative Final    Tricyclic Antidepressants Screen 10/11/2024 Negative  Negative Final    Methadone Screen, Urine 10/11/2024 Negative  Negative Final    Barbiturates Screen, Urine 10/11/2024 Negative  Negative Final    Oxycodone Screen, Urine 10/11/2024 Negative  Negative Final    Buprenorphine, Screen, Urine 10/11/2024 Negative  Negative Final    Fentanyl, Urine 10/11/2024 Negative  Negative Final         Assessment & Plan   Problems Addressed this Visit          Mental Health    Attention deficit hyperactivity disorder (ADHD)    Relevant Medications    amphetamine-dextroamphetamine XR (ADDERALL XR) 25 MG 24 hr capsule     Other Visit Diagnoses         Bipolar affective disorder, mixed    -  Primary     Relevant Medications    amphetamine-dextroamphetamine XR (ADDERALL XR) 25 MG 24 hr capsule      Seasonal depression        Relevant Medications    amphetamine-dextroamphetamine XR (ADDERALL XR) 25 MG 24 hr capsule      Medication management        Relevant Medications    amphetamine-dextroamphetamine XR (ADDERALL XR) 25 MG 24 hr capsule          Diagnoses         Codes Comments      Bipolar affective disorder, mixed    -  Primary ICD-10-CM: F31.60  ICD-9-CM: 296.60       Seasonal depression     ICD-10-CM: F33.8  ICD-9-CM: 296.99       Attention deficit hyperactivity disorder (ADHD), combined type     ICD-10-CM: F90.2  ICD-9-CM: 314.01       Medication management     ICD-10-CM: Z79.899  ICD-9-CM: V58.69             Visit Diagnoses:    ICD-10-CM ICD-9-CM   1. Bipolar affective disorder, mixed  F31.60 296.60   2. Seasonal depression  F33.8 296.99   3. Attention deficit hyperactivity disorder (ADHD), combined type  F90.2 314.01   4. Medication management  Z79.899 V58.69             Juan David recently reviewed and appropriate per report #[id number]  UDS current.  Voices stability with current medication regimen and thus no changes made today, refills provided where needed. Previously educated upon side effects with use of these medications as well as when to present for emergency services, denies at this time. Instructions sent regarding use of medications attached to visit of initial prescribing date.  Instructed to discuss medications during preop screening, advised to at least hold Adderall 48 hours prior to surgery.  Follow up this provider in 4 to 6 weeks or sooner if needed.    Risks, benefits, alternatives discussed with patient including GI upset, nausea vomiting diarrhea, theoretical decrease of seizure threshold predisposing the patient to a slightly higher seizure risk, headaches, sexual dysfunction, serotonin syndrome, bleeding risk, increased suicidality in patients 24 years and younger, switching to  lesa/hypomania.  After discussion of these risks and benefits, the patient voiced understanding and agreed to proceed.       The patient is being prescribed a controlled substance as part of the treatment plan. The patient has been educated of appropriate use of the medication(s), including risks and side effects such as insomnia, headache, exacerbation of tics, nervousness, irritability, overstimulation, tremor, dizziness, anorexia or change in appetite, nausea, dry mouth, constipation, diarrhea, weight loss, sexual dysfunction, psychotic episodes, seizures, palpitations, tachycardia, hypertension, rare activation (activation of hypomania, lesa, and/or suicidal ideations), cardiovascular adverse effects including sudden death (especially patients with pre-existing structural abnormalities often associated with a family history of cardiac disease), may slow normal growth in children, weight gain is reported but not expected, sedation is possible but activation is much more common, metabolic adversities, the risk of tolerance and dependence, and overdose among others. Without the prescribed medication for ADHD, it is reported that the patient has problems with attention and focus including being easily distracted, easily losing objects, trouble with time management, trouble completing tasks because of distractions, procrastination, indecisiveness, careless mistakes in daily activities/work, and not finishing jobs that are started. The goals and objectives with treatment have been discussed including management of reported symptoms of ADHD, to minimize the impact of ADHD symptoms on patient function while maximizing the patient's ability to compensate or cope with any remaining difficulties, and to assist the patient with being successful and productive in their life/daily pursuits. The patient denies any side effects, no cardiovascular symptoms including palpitations or hypertension, no development or worsening of  insomnia, and no development or worsening of anxiety symptoms on the medication. Due to the reported problems without the medication usage, as well as the reported significant improvement in symptoms with the medication usage, the patient requests to remain on the prescribed medication for ADHD and does not feel tapering or coming off of medication at this time is appropriate. The reported symptoms of ADHD, any reported resolution of symptoms, and the necessity and appropriateness for continued treatment with a controlled substance will be reevaluated at each encounter. Patient is also informed that the medication is to be used by the patient only, the medication is to be used only as directed, and the medication should not be combined with other substances, OTC or prescription, unless directed by a Provider/Prescriber. The patient verbalized understanding and agreement with this in their own words, and wish to continue with the current treatment plan.       GOALS:  Short Term Goals: Patient will be compliant with medication, and patient will have no significant medication related side effects.  Patient will be engaged in psychotherapy as indicated.  Patient will report subjective improvement of symptoms.  Long term goals: To stabilize mood and treat/improve subjective symptoms, the patient will stay out of the hospital, the patient will be at an optimal level of functioning, and the patient will take all medications as prescribed.  The patient/guardian verbalized understanding and agreement with goals that were mutually set.      TREATMENT PLAN: Continue supportive psychotherapy efforts and medications as indicated.  Pharmacological and Non-Pharmacological treatment options discussed during today's visit. Patient/Guardian acknowledged and verbally consented with current treatment plan and was educated on the importance of compliance with treatment and follow-up appointments.      MEDICATION ISSUES:  Discussed  medication options and treatment plan of prescribed medication as well as the risks, benefits, any black box warnings, and side effects including potential falls, possible impaired driving, and metabolic adversities among others. Patient is agreeable to call the office with any worsening of symptoms or onset of side effects, or if any concerns or questions arise.  The contact information for the office is made available to the patient. Patient is agreeable to call 911 or go to the nearest ER should they begin having any SI/HI, or if any urgent concerns arise. No medication side effects or related complaints today.     MEDS ORDERED DURING VISIT:  New Medications Ordered This Visit   Medications    amphetamine-dextroamphetamine XR (ADDERALL XR) 25 MG 24 hr capsule     Sig: Take 1 capsule by mouth Daily     Dispense:  30 capsule     Refill:  0     No early refills       Follow Up Appointment:   Return in about 4 weeks (around 5/8/2025) for Recheck.             This document has been electronically signed by DEVEN Murillo  April 10, 2025 11:32 EDT    Some of the data in this electronic note has been brought forward from a previous encounter, any necessary changes have been made, it has been reviewed by this APRN, and it is accurate.    Dictated Utilizing Dragon Dictation: Part of this note may be an electronic transcription/translation of spoken language to printed text using the Dragon Dictation System.

## 2025-04-15 DIAGNOSIS — M51.379 DEGENERATION OF LUMBAR OR LUMBOSACRAL INTERVERTEBRAL DISC: ICD-10-CM

## 2025-05-02 ENCOUNTER — PRE-ADMISSION TESTING (OUTPATIENT)
Dept: PREADMISSION TESTING | Facility: HOSPITAL | Age: 50
End: 2025-05-02
Payer: COMMERCIAL

## 2025-05-02 VITALS — HEIGHT: 73 IN | WEIGHT: 187.61 LBS | BODY MASS INDEX: 24.86 KG/M2

## 2025-05-02 LAB
ALBUMIN SERPL-MCNC: 4.3 G/DL (ref 3.5–5.2)
ALBUMIN/GLOB SERPL: 1.7 G/DL
ALP SERPL-CCNC: 101 U/L (ref 39–117)
ALT SERPL W P-5'-P-CCNC: 40 U/L (ref 1–41)
ANION GAP SERPL CALCULATED.3IONS-SCNC: 8 MMOL/L (ref 5–15)
APTT PPP: 29.4 SECONDS (ref 22–39)
AST SERPL-CCNC: 30 U/L (ref 1–40)
BASOPHILS # BLD AUTO: 0.02 10*3/MM3 (ref 0–0.2)
BASOPHILS NFR BLD AUTO: 0.3 % (ref 0–1.5)
BILIRUB SERPL-MCNC: 0.3 MG/DL (ref 0–1.2)
BUN SERPL-MCNC: 15 MG/DL (ref 6–20)
BUN/CREAT SERPL: 16.5 (ref 7–25)
CALCIUM SPEC-SCNC: 9.3 MG/DL (ref 8.6–10.5)
CHLORIDE SERPL-SCNC: 102 MMOL/L (ref 98–107)
CO2 SERPL-SCNC: 29 MMOL/L (ref 22–29)
CREAT SERPL-MCNC: 0.91 MG/DL (ref 0.76–1.27)
DEPRECATED RDW RBC AUTO: 42.8 FL (ref 37–54)
EGFRCR SERPLBLD CKD-EPI 2021: 102 ML/MIN/1.73
EOSINOPHIL # BLD AUTO: 0.32 10*3/MM3 (ref 0–0.4)
EOSINOPHIL NFR BLD AUTO: 5.4 % (ref 0.3–6.2)
ERYTHROCYTE [DISTWIDTH] IN BLOOD BY AUTOMATED COUNT: 13.2 % (ref 12.3–15.4)
GLOBULIN UR ELPH-MCNC: 2.5 GM/DL
GLUCOSE SERPL-MCNC: 95 MG/DL (ref 65–99)
HBA1C MFR BLD: 5.5 % (ref 4.8–5.6)
HCT VFR BLD AUTO: 44.2 % (ref 37.5–51)
HGB BLD-MCNC: 14.6 G/DL (ref 13–17.7)
IMM GRANULOCYTES # BLD AUTO: 0.01 10*3/MM3 (ref 0–0.05)
IMM GRANULOCYTES NFR BLD AUTO: 0.2 % (ref 0–0.5)
INR PPP: 0.95 (ref 0.89–1.12)
LYMPHOCYTES # BLD AUTO: 1.98 10*3/MM3 (ref 0.7–3.1)
LYMPHOCYTES NFR BLD AUTO: 33.1 % (ref 19.6–45.3)
MCH RBC QN AUTO: 29.3 PG (ref 26.6–33)
MCHC RBC AUTO-ENTMCNC: 33 G/DL (ref 31.5–35.7)
MCV RBC AUTO: 88.6 FL (ref 79–97)
MONOCYTES # BLD AUTO: 0.48 10*3/MM3 (ref 0.1–0.9)
MONOCYTES NFR BLD AUTO: 8 % (ref 5–12)
NEUTROPHILS NFR BLD AUTO: 3.17 10*3/MM3 (ref 1.7–7)
NEUTROPHILS NFR BLD AUTO: 53 % (ref 42.7–76)
NRBC BLD AUTO-RTO: 0 /100 WBC (ref 0–0.2)
PLATELET # BLD AUTO: 168 10*3/MM3 (ref 140–450)
PMV BLD AUTO: 10.6 FL (ref 6–12)
POTASSIUM SERPL-SCNC: 4.3 MMOL/L (ref 3.5–5.2)
PROT SERPL-MCNC: 6.8 G/DL (ref 6–8.5)
PROTHROMBIN TIME: 13.3 SECONDS (ref 12.2–15.3)
RBC # BLD AUTO: 4.99 10*6/MM3 (ref 4.14–5.8)
SODIUM SERPL-SCNC: 139 MMOL/L (ref 136–145)
WBC NRBC COR # BLD AUTO: 5.98 10*3/MM3 (ref 3.4–10.8)

## 2025-05-02 PROCEDURE — 36415 COLL VENOUS BLD VENIPUNCTURE: CPT

## 2025-05-02 PROCEDURE — 85025 COMPLETE CBC W/AUTO DIFF WBC: CPT

## 2025-05-02 PROCEDURE — 85610 PROTHROMBIN TIME: CPT

## 2025-05-02 PROCEDURE — 80053 COMPREHEN METABOLIC PANEL: CPT

## 2025-05-02 PROCEDURE — 83036 HEMOGLOBIN GLYCOSYLATED A1C: CPT

## 2025-05-02 PROCEDURE — 85730 THROMBOPLASTIN TIME PARTIAL: CPT

## 2025-05-02 NOTE — PAT
Patient viewed general PAT education video as instructed in their preoperative information received from their surgeon.  Patient stated the general PAT education video was viewed in its entirety and survey completed.  Copies of PAT general education handouts (Incentive Spirometry, Meds to Beds Program, Patient Belongings, Pre-op skin preparation instructions, Blood Glucose testing, Visitor policy, Surgery FAQ, Code H) distributed to patient if not printed. Education related to the PAT pass and skin preparation for surgery (if applicable) completed in PAT as a reinforcement to PAT education video. Patient instructed to return PAT pass provided today as well as completed skin preparation sheet (if applicable) on the day of procedure.     Additionally if patient had not viewed video yet but intended to view it at home or in our waiting area, then referred them to the handout with QR code/link provided during PAT visit.  Encouraged patient/family to read PAT general education handouts thoroughly and notify PAT staff with any questions or concerns. Patient verbalized understanding of all information and priority content.    An arrival time for procedure was not provided during PAT visit. If patient had any questions or concerns about their arrival time, they were instructed to contact their surgeon/physician.  Additionally, if the patient referred to an arrival time that was acquired from their my chart account, patient was encouraged to verify that time with their surgeon/physician. Arrival times are NOT provided in Pre Admission Testing Department.    Post-Surgery Information Instruction Sheet given to patient during Pre-Admission Testing Visit with verbal instructions to patient to return with PAT PASS on the day of surgery. Additionally, encouraged patient to review the information provided.    InfuBLOCK (by [employer]) pain pump patient informational handout given to patient.  Instructed patient to watch Inf[employer]  Patient Education Video regarding Peripheral Nerve Catheter that will be in place for upcoming surgery unless contraindicated. The video can be accessed using QR code noted on handout.  Patient agreed to watch video.  Stressed to patient to call [facility] Nursing Hotline  if patient has any questions or concerns after discharge.     Patient's surgeon called in a prescription for Benzol Peroxide 5% wash to [facility].  Patient instructed to  from [facility] that was submitted electronically.  If prescription not available, instructed to purchase the wash over the counter since a prescription is not required. Verbal and written instructions given regarding proper use of the Benzoyl Peroxide wash were provided to patient and/or famlily during PAT visit. Patient/family also instructed to complete Benzol Peroxide checklist and return it to Pre-op on the day of surgery.  Patient and/or family verbalized understanding.      Additionally, reinforced with patient to acquire this prescription from the [facility] pharmacy before leaving the hospital after PAT visit due to the potential unavailability at local pharmacies.    Patient instructed to drink 20 ounces of Gatorade or Gatorlyte (if diabetic) and it needs to be completed 1 hour (for Main OR patients) or 2 hours (scheduled  section & [facility] patients) before given arrival time for procedure (NO RED Gatorade and NO Gatorade Zero).    Patient verbalized understanding.    Verified patient previously completed cardiology visit for cardiac risk assessment in preparation for upcoming procedure, completion of 12-lead ECG within six months, and risk assessment letter reviewed. No further interventions required.   CARDIAC RISK AND EKG IN [facility] FROM DR MARTINEZ ON 25. PATIENT DENIES CHEST PAIN AND SHORTNESS OF BREATH    Patient denies any current skin issues.         PATIENT GIVEN INSTRUCTIONS ON BACTROBAN AND WILL  FROM PHARMACY

## 2025-05-15 ENCOUNTER — ANESTHESIA EVENT (OUTPATIENT)
Dept: PERIOP | Facility: HOSPITAL | Age: 50
End: 2025-05-15
Payer: COMMERCIAL

## 2025-05-16 ENCOUNTER — ANESTHESIA (OUTPATIENT)
Dept: PERIOP | Facility: HOSPITAL | Age: 50
End: 2025-05-16
Payer: COMMERCIAL

## 2025-05-16 ENCOUNTER — ANESTHESIA EVENT CONVERTED (OUTPATIENT)
Dept: ANESTHESIOLOGY | Facility: HOSPITAL | Age: 50
End: 2025-05-16
Payer: COMMERCIAL

## 2025-05-16 ENCOUNTER — HOSPITAL ENCOUNTER (OUTPATIENT)
Facility: HOSPITAL | Age: 50
Setting detail: SURGERY ADMIT
Discharge: HOME | End: 2025-05-16
Attending: ORTHOPAEDIC SURGERY | Admitting: ORTHOPAEDIC SURGERY
Payer: COMMERCIAL

## 2025-05-16 VITALS
HEART RATE: 67 BPM | SYSTOLIC BLOOD PRESSURE: 99 MMHG | DIASTOLIC BLOOD PRESSURE: 56 MMHG | HEIGHT: 73 IN | BODY MASS INDEX: 24.78 KG/M2 | WEIGHT: 187 LBS | TEMPERATURE: 98 F | RESPIRATION RATE: 16 BRPM | OXYGEN SATURATION: 97 %

## 2025-05-16 PROCEDURE — C1713 ANCHOR/SCREW BN/BN,TIS/BN: HCPCS | Performed by: ORTHOPAEDIC SURGERY

## 2025-05-16 PROCEDURE — 25010000002 BUPIVACAINE (PF) 0.25 % SOLUTION: Performed by: NURSE ANESTHETIST, CERTIFIED REGISTERED

## 2025-05-16 PROCEDURE — C1755 CATHETER, INTRASPINAL: HCPCS | Performed by: ORTHOPAEDIC SURGERY

## 2025-05-16 PROCEDURE — 25010000002 MIDAZOLAM PER 1 MG: Performed by: ANESTHESIOLOGY

## 2025-05-16 PROCEDURE — 25010000002 PHENYLEPHRINE 10 MG/ML SOLUTION: Performed by: ANESTHESIOLOGY

## 2025-05-16 PROCEDURE — 25010000002 EPINEPHRINE PER 0.1 MG: Performed by: ORTHOPAEDIC SURGERY

## 2025-05-16 PROCEDURE — 25010000002 SUGAMMADEX 200 MG/2ML SOLUTION: Performed by: ANESTHESIOLOGY

## 2025-05-16 PROCEDURE — 25010000002 VANCOMYCIN 1 G RECONSTITUTED SOLUTION: Performed by: ORTHOPAEDIC SURGERY

## 2025-05-16 PROCEDURE — 25010000002 ROPIVACAINE HCL-NACL 0.2-0.9 % SOLUTION: Performed by: NURSE ANESTHETIST, CERTIFIED REGISTERED

## 2025-05-16 PROCEDURE — 25010000002 ONDANSETRON PER 1 MG: Performed by: ANESTHESIOLOGY

## 2025-05-16 PROCEDURE — 25010000002 FENTANYL CITRATE (PF) 50 MCG/ML SOLUTION: Performed by: NURSE ANESTHETIST, CERTIFIED REGISTERED

## 2025-05-16 PROCEDURE — 25810000003 LACTATED RINGERS PER 1000 ML: Performed by: ANESTHESIOLOGY

## 2025-05-16 PROCEDURE — 25010000002 DEXAMETHASONE PER 1 MG: Performed by: ANESTHESIOLOGY

## 2025-05-16 PROCEDURE — 25010000002 DEXAMETHASONE SODIUM PHOSPHATE 10 MG/ML SOLUTION: Performed by: NURSE ANESTHETIST, CERTIFIED REGISTERED

## 2025-05-16 PROCEDURE — 25010000002 GLYCOPYRROLATE 1 MG/5ML SOLUTION: Performed by: ANESTHESIOLOGY

## 2025-05-16 PROCEDURE — 25010000002 PROPOFOL 10 MG/ML EMULSION: Performed by: ANESTHESIOLOGY

## 2025-05-16 PROCEDURE — 25010000002 CEFAZOLIN PER 500 MG: Performed by: ORTHOPAEDIC SURGERY

## 2025-05-16 PROCEDURE — 25010000002 HYDROMORPHONE 1 MG/ML SOLUTION

## 2025-05-16 PROCEDURE — 25010000002 LIDOCAINE PF 1% 1 % SOLUTION: Performed by: ANESTHESIOLOGY

## 2025-05-16 DEVICE — KNOTLESS TENSIONABLE FIBERTAK BICEPS KIT
Type: IMPLANTABLE DEVICE | Site: SHOULDER | Status: FUNCTIONAL
Brand: ARTHREX®

## 2025-05-16 RX ORDER — SODIUM CHLORIDE, SODIUM LACTATE, POTASSIUM CHLORIDE, CALCIUM CHLORIDE 600; 310; 30; 20 MG/100ML; MG/100ML; MG/100ML; MG/100ML
9 INJECTION, SOLUTION INTRAVENOUS CONTINUOUS
Status: DISCONTINUED | OUTPATIENT
Start: 2025-05-17 | End: 2025-05-16 | Stop reason: HOSPADM

## 2025-05-16 RX ORDER — ONDANSETRON 2 MG/ML
INJECTION INTRAMUSCULAR; INTRAVENOUS AS NEEDED
Status: DISCONTINUED | OUTPATIENT
Start: 2025-05-16 | End: 2025-05-16 | Stop reason: SURG

## 2025-05-16 RX ORDER — DEXAMETHASONE SODIUM PHOSPHATE 10 MG/ML
INJECTION, SOLUTION INTRAMUSCULAR; INTRAVENOUS
Status: COMPLETED | OUTPATIENT
Start: 2025-05-16 | End: 2025-05-16

## 2025-05-16 RX ORDER — VANCOMYCIN HYDROCHLORIDE 1 G/20ML
INJECTION, POWDER, LYOPHILIZED, FOR SOLUTION INTRAVENOUS AS NEEDED
Status: DISCONTINUED | OUTPATIENT
Start: 2025-05-16 | End: 2025-05-16 | Stop reason: HOSPADM

## 2025-05-16 RX ORDER — PREGABALIN 75 MG/1
75 CAPSULE ORAL ONCE
Status: COMPLETED | OUTPATIENT
Start: 2025-05-16 | End: 2025-05-16

## 2025-05-16 RX ORDER — FAMOTIDINE 10 MG/ML
20 INJECTION, SOLUTION INTRAVENOUS ONCE
Status: DISCONTINUED | OUTPATIENT
Start: 2025-05-16 | End: 2025-05-16 | Stop reason: HOSPADM

## 2025-05-16 RX ORDER — SODIUM CHLORIDE 0.9 % (FLUSH) 0.9 %
10 SYRINGE (ML) INJECTION EVERY 12 HOURS SCHEDULED
Status: DISCONTINUED | OUTPATIENT
Start: 2025-05-16 | End: 2025-05-16 | Stop reason: HOSPADM

## 2025-05-16 RX ORDER — LIDOCAINE HYDROCHLORIDE 10 MG/ML
INJECTION, SOLUTION EPIDURAL; INFILTRATION; INTRACAUDAL; PERINEURAL AS NEEDED
Status: DISCONTINUED | OUTPATIENT
Start: 2025-05-16 | End: 2025-05-16 | Stop reason: SURG

## 2025-05-16 RX ORDER — BUPIVACAINE HYDROCHLORIDE 2.5 MG/ML
INJECTION, SOLUTION EPIDURAL; INFILTRATION; INTRACAUDAL; PERINEURAL
Status: COMPLETED | OUTPATIENT
Start: 2025-05-16 | End: 2025-05-16

## 2025-05-16 RX ORDER — MIDAZOLAM HYDROCHLORIDE 1 MG/ML
1 INJECTION, SOLUTION INTRAMUSCULAR; INTRAVENOUS
Status: DISCONTINUED | OUTPATIENT
Start: 2025-05-16 | End: 2025-05-16 | Stop reason: HOSPADM

## 2025-05-16 RX ORDER — ROPIVACAINE HYDROCHLORIDE 2 MG/ML
INJECTION, SOLUTION EPIDURAL; INFILTRATION; PERINEURAL CONTINUOUS
Status: DISCONTINUED | OUTPATIENT
Start: 2025-05-16 | End: 2025-05-16 | Stop reason: HOSPADM

## 2025-05-16 RX ORDER — OXYCODONE HYDROCHLORIDE 5 MG/1
5 TABLET ORAL EVERY 6 HOURS PRN
Qty: 40 TABLET | Refills: 0 | Status: SHIPPED | OUTPATIENT
Start: 2025-05-16

## 2025-05-16 RX ORDER — HYDROMORPHONE HYDROCHLORIDE 1 MG/ML
0.5 INJECTION, SOLUTION INTRAMUSCULAR; INTRAVENOUS; SUBCUTANEOUS
Status: DISCONTINUED | OUTPATIENT
Start: 2025-05-16 | End: 2025-05-16 | Stop reason: HOSPADM

## 2025-05-16 RX ORDER — EPINEPHRINE 1 MG/ML
INJECTION, SOLUTION, CONCENTRATE INTRAVENOUS AS NEEDED
Status: DISCONTINUED | OUTPATIENT
Start: 2025-05-16 | End: 2025-05-16 | Stop reason: HOSPADM

## 2025-05-16 RX ORDER — SODIUM CHLORIDE, SODIUM LACTATE, POTASSIUM CHLORIDE, AND CALCIUM CHLORIDE .6; .31; .03; .02 G/100ML; G/100ML; G/100ML; G/100ML
IRRIGANT IRRIGATION AS NEEDED
Status: DISCONTINUED | OUTPATIENT
Start: 2025-05-16 | End: 2025-05-16 | Stop reason: HOSPADM

## 2025-05-16 RX ORDER — PROPOFOL 10 MG/ML
VIAL (ML) INTRAVENOUS AS NEEDED
Status: DISCONTINUED | OUTPATIENT
Start: 2025-05-16 | End: 2025-05-16 | Stop reason: SURG

## 2025-05-16 RX ORDER — SODIUM CHLORIDE 0.9 % (FLUSH) 0.9 %
10 SYRINGE (ML) INJECTION AS NEEDED
Status: DISCONTINUED | OUTPATIENT
Start: 2025-05-16 | End: 2025-05-16 | Stop reason: HOSPADM

## 2025-05-16 RX ORDER — DEXMEDETOMIDINE HYDROCHLORIDE 100 UG/ML
INJECTION, SOLUTION INTRAVENOUS AS NEEDED
Status: DISCONTINUED | OUTPATIENT
Start: 2025-05-16 | End: 2025-05-16 | Stop reason: SURG

## 2025-05-16 RX ORDER — ONDANSETRON 2 MG/ML
4 INJECTION INTRAMUSCULAR; INTRAVENOUS ONCE AS NEEDED
Status: DISCONTINUED | OUTPATIENT
Start: 2025-05-16 | End: 2025-05-16 | Stop reason: HOSPADM

## 2025-05-16 RX ORDER — DEXAMETHASONE SODIUM PHOSPHATE 4 MG/ML
INJECTION, SOLUTION INTRA-ARTICULAR; INTRALESIONAL; INTRAMUSCULAR; INTRAVENOUS; SOFT TISSUE AS NEEDED
Status: DISCONTINUED | OUTPATIENT
Start: 2025-05-16 | End: 2025-05-16 | Stop reason: SURG

## 2025-05-16 RX ORDER — LIDOCAINE HYDROCHLORIDE 10 MG/ML
0.5 INJECTION, SOLUTION EPIDURAL; INFILTRATION; INTRACAUDAL; PERINEURAL ONCE AS NEEDED
Status: DISCONTINUED | OUTPATIENT
Start: 2025-05-16 | End: 2025-05-16 | Stop reason: HOSPADM

## 2025-05-16 RX ORDER — FAMOTIDINE 20 MG/1
20 TABLET, FILM COATED ORAL ONCE
Status: COMPLETED | OUTPATIENT
Start: 2025-05-16 | End: 2025-05-16

## 2025-05-16 RX ORDER — PHENYLEPHRINE HYDROCHLORIDE 10 MG/ML
INJECTION INTRAVENOUS AS NEEDED
Status: DISCONTINUED | OUTPATIENT
Start: 2025-05-16 | End: 2025-05-16 | Stop reason: SURG

## 2025-05-16 RX ORDER — FENTANYL CITRATE 50 UG/ML
50 INJECTION, SOLUTION INTRAMUSCULAR; INTRAVENOUS
Status: DISCONTINUED | OUTPATIENT
Start: 2025-05-16 | End: 2025-05-16 | Stop reason: HOSPADM

## 2025-05-16 RX ORDER — GLYCOPYRROLATE 0.2 MG/ML
INJECTION INTRAMUSCULAR; INTRAVENOUS AS NEEDED
Status: DISCONTINUED | OUTPATIENT
Start: 2025-05-16 | End: 2025-05-16 | Stop reason: SURG

## 2025-05-16 RX ORDER — FENTANYL CITRATE 50 UG/ML
INJECTION, SOLUTION INTRAMUSCULAR; INTRAVENOUS
Status: COMPLETED | OUTPATIENT
Start: 2025-05-16 | End: 2025-05-16

## 2025-05-16 RX ORDER — ACETAMINOPHEN 500 MG
1000 TABLET ORAL ONCE
Status: COMPLETED | OUTPATIENT
Start: 2025-05-16 | End: 2025-05-16

## 2025-05-16 RX ORDER — ROCURONIUM BROMIDE 10 MG/ML
INJECTION, SOLUTION INTRAVENOUS AS NEEDED
Status: DISCONTINUED | OUTPATIENT
Start: 2025-05-16 | End: 2025-05-16 | Stop reason: SURG

## 2025-05-16 RX ADMIN — ONDANSETRON 4 MG: 2 INJECTION INTRAMUSCULAR; INTRAVENOUS at 13:38

## 2025-05-16 RX ADMIN — DEXMEDETOMIDINE HYDROCHLORIDE 20 MCG: 100 INJECTION, SOLUTION INTRAVENOUS at 13:20

## 2025-05-16 RX ADMIN — DEXMEDETOMIDINE HYDROCHLORIDE 8 MCG: 100 INJECTION, SOLUTION INTRAVENOUS at 13:47

## 2025-05-16 RX ADMIN — BUPIVACAINE HYDROCHLORIDE 30 ML: 2.5 INJECTION, SOLUTION EPIDURAL; INFILTRATION; INTRACAUDAL; PERINEURAL at 10:52

## 2025-05-16 RX ADMIN — DEXMEDETOMIDINE HYDROCHLORIDE 20 MCG: 100 INJECTION, SOLUTION INTRAVENOUS at 13:34

## 2025-05-16 RX ADMIN — GLYCOPYRROLATE 0.2 MCG: 0.2 INJECTION, SOLUTION INTRAMUSCULAR; INTRAVENOUS at 13:20

## 2025-05-16 RX ADMIN — MIDAZOLAM HYDROCHLORIDE 1 MG: 1 INJECTION, SOLUTION INTRAMUSCULAR; INTRAVENOUS at 10:50

## 2025-05-16 RX ADMIN — PREGABALIN 75 MG: 75 CAPSULE ORAL at 10:51

## 2025-05-16 RX ADMIN — ROCURONIUM BROMIDE 50 MG: 10 INJECTION INTRAVENOUS at 12:20

## 2025-05-16 RX ADMIN — FAMOTIDINE 20 MG: 20 TABLET, FILM COATED ORAL at 10:51

## 2025-05-16 RX ADMIN — PROPOFOL 200 MG: 10 INJECTION, EMULSION INTRAVENOUS at 12:20

## 2025-05-16 RX ADMIN — FENTANYL CITRATE 100 MCG: 50 INJECTION, SOLUTION INTRAMUSCULAR; INTRAVENOUS at 10:52

## 2025-05-16 RX ADMIN — SUGAMMADEX 200 MG: 100 INJECTION, SOLUTION INTRAVENOUS at 13:51

## 2025-05-16 RX ADMIN — PHENYLEPHRINE HYDROCHLORIDE 50 MCG: 10 INJECTION INTRAVENOUS at 12:41

## 2025-05-16 RX ADMIN — Medication 1000 MG: at 14:12

## 2025-05-16 RX ADMIN — SODIUM CHLORIDE, POTASSIUM CHLORIDE, SODIUM LACTATE AND CALCIUM CHLORIDE: 600; 310; 30; 20 INJECTION, SOLUTION INTRAVENOUS at 12:15

## 2025-05-16 RX ADMIN — HYDROMORPHONE HYDROCHLORIDE 0.5 MG: 1 INJECTION, SOLUTION INTRAMUSCULAR; INTRAVENOUS; SUBCUTANEOUS at 14:33

## 2025-05-16 RX ADMIN — SODIUM CHLORIDE 2 G: 900 INJECTION INTRAVENOUS at 12:25

## 2025-05-16 RX ADMIN — ACETAMINOPHEN 1000 MG: 500 TABLET ORAL at 10:51

## 2025-05-16 RX ADMIN — SODIUM CHLORIDE, POTASSIUM CHLORIDE, SODIUM LACTATE AND CALCIUM CHLORIDE: 600; 310; 30; 20 INJECTION, SOLUTION INTRAVENOUS at 13:45

## 2025-05-16 RX ADMIN — DEXAMETHASONE SODIUM PHOSPHATE 2 MG: 10 INJECTION INTRAMUSCULAR; INTRAVENOUS at 10:53

## 2025-05-16 RX ADMIN — BUPIVACAINE HYDROCHLORIDE 15 ML: 2.5 INJECTION, SOLUTION EPIDURAL; INFILTRATION; INTRACAUDAL; PERINEURAL at 10:52

## 2025-05-16 RX ADMIN — LIDOCAINE HYDROCHLORIDE 50 MG: 10 INJECTION, SOLUTION EPIDURAL; INFILTRATION; INTRACAUDAL; PERINEURAL at 12:20

## 2025-05-16 RX ADMIN — DEXAMETHASONE SODIUM PHOSPHATE 8 MG: 4 INJECTION, SOLUTION INTRAMUSCULAR; INTRAVENOUS at 12:34

## 2025-05-16 NOTE — OP NOTE
Preoperative Diagnosis:Right shoulder Type 2 superior labral tear, subacromial bursitis with impingement, symptomatic Acromioclavicular joint arthropathy    Postoperative Diagnosis:same    Procedure:   R shoulder arthroscopy with extensive debridement ant-post  R shoulder arthroscopic biceps tenodesis  R shoulder arthroscopic subacromial decompression with acromioplasty  R shoulder open distal clavicle excision    Surgeon: Dr. Charbel James    Assistant: Yasmani Pickett PA-C  ** Please note the physician assistant was medically necessary to assist with positioning retraction, arm positioning, care of soft tissues and closure    EBL:   25 mL    Anesthesia: GETA with interscalene brachial plexus block    Implants:  Arthrex Fibertak biceps tenodesis anchor      Findings:  Glenoid: grade 2-3 posterior chondral change  Humerus:negative  Labrum:superior labral tear with posterior extension type 2 B, mild fraying anterior and posterior  Biceps: low grade partial thickness tear with synovitis  Rotator Cuff:  Subscapularis: intact  Supraspinatus: low grade partial articular sided tear < 10 %  Infraspinatus: intact  Teres Minor: intact  Axillary Pouch: negative        Indications: Patient   is a 50yo M who was seen in the orthopaedic clinic and after clinical and radiographic evaluation was diagnosed with above diagnosis.  Risks and benefits of operative versus nonoperative management discussed.  Patient elected to proceed with surgery. Informed consent obtained.       Description: Patient was met in the preoperative holding area and confirmed by name, medical record number, .  The operative site was correctly identified. Patient was then met by anesthesia and cleared for surgery.  An interscalene brachial plexus block was then performed.  Patient was then brought to the operating room suite and and placed supine on the OR table.  Patient underwent smooth induction with GETA.  Patient then positioned in the beach chair  position. Patient’s R shoulder and R upper extremity prepped and draped in sterile fashion. Preoperative prophylactic antibiotic given.  A timeout was then observed.    A standard posterior viewing portal was then established.  A diagnostic scope of the intra-articular space was performed with the findings listed above.  A standard rotator interval portal was then made.  A shaver was then inserted and the rotator interval was opened further.  Fraying of the anterior and posterior labrum was then debrided with a  few small areas of hypertrophic synovium in the anterior and posterior aspects of the joint also debrided with a shaver     Visualization of the subacromial space was then confirmed, a spinal needle was used to make a posterolateral portal and a combination of a shaver and cautery were used to debride the subacromial bursa .A concepcion was then used to perform a limited acromioplasty of a small anterolateral acromial spur.        We then placed the camera in the posterolateral portal.  We then made an accessory zeny-superolateral portal with a spinal needle.  We then exposed the long head of the biceps in the groove.  We then drilled and placed our fibertak anchor in the biceps groove, and shuttled our stitches accordingly.  We then tensioned our biceps down in the groove completing our biceps tenodesis.  We then cut the biceps above the fibertak and then placed the camera in the articular space and tenotomized the biceps off the biceps anchor and removed the resected stump of bicep tissue.       We then withdrew our arthroscopic instrumentation and closed the portals with interrupted 3-0 nylon.     We then made a 2cm incision over the AC joint.  We then dissected down to the delto-trapezial fascia.  We then raised full thickness fascial flaps off the distal clavicle.  We then used a microsagittal saw to resect approx 1cm of distal clavicular bone at the AC joint. We then contoured the bone with a rasp.  We  then irrigated with saline and placed vancomycin powder in the wound.  We then closed the delto-trap fascia with running locking 0-vicryl x 2.  We then closed the dermis and skin with 2-0 vicryl and then 3-0 nylon.     A sterile dressing was applied.  A sling with abduction pillow was applied    Patient tolerated the procedure well.  He was extubated and placed in a sling.  At the end of the case all sponge and instrument counts were correct x 2.      Plan: Patient will be seen back at approx 10 days postop. Rehab  biceps tenodesis protocol.

## 2025-05-16 NOTE — ANESTHESIA PROCEDURE NOTES
Airway  Reason: elective    Date/Time: 5/16/2025 12:23 PM  Airway not difficult    General Information and Staff    Patient location during procedure: OR  SRNA: Izabel Melgar SRNA  Indications and Patient Condition  Indications for airway management: airway protection    Preoxygenated: yes  MILS not maintained throughout    Mask difficulty assessment: 1 - vent by mask    Final Airway Details    Final airway type: endotracheal airway      Successful airway: ETT  Cuffed: yes   Successful intubation technique: direct laryngoscopy  Endotracheal tube insertion site: oral  Blade: Domi  Blade size: 4  ETT size (mm): 8.0  Cormack-Lehane Classification: grade I - full view of glottis  Placement verified by: chest auscultation and capnometry   Cuff volume (mL): 8  Measured from: teeth  ETT/EBT  to teeth (cm): 21  Number of attempts at approach: 2  Assessment: lips, teeth, and gum same as pre-op and atraumatic intubation    Additional Comments  Negative epigastric sounds, Breath sound equal bilaterally with symmetric chest rise and fall

## 2025-05-16 NOTE — ANESTHESIA POSTPROCEDURE EVALUATION
Patient: Ronaldo Cantu    Procedure Summary       Date: 05/16/25 Room / Location:  [facility]  /  [facility]    Anesthesia Start: 1215 Anesthesia Stop: 1412    Procedures:       RIGHT SHOULDER ARTHROSCOPY WITH BICEPS TENODESIS (Right: Shoulder)      OPEN DISTAL CLAVICAL EXCISION (Right: Shoulder) Diagnosis:       SLAP (superior labrum from anterior to posterior) lesion      Acromioclavicular arthrosis      (SLAP tear, Ac arthropathy)    Surgeons: Charbel James MD Provider: [place] Jr., MD    Anesthesia Type: general ASA Status: 3            Anesthesia Type: general    Vitals  No vitals data found for the desired time range.          Post Anesthesia Care and Evaluation    Patient location during evaluation: PACU  Patient participation: complete - patient participated  Level of consciousness: sleepy but conscious  Pain management: adequate    Airway patency: patent  Anesthetic complications: No anesthetic complications  PONV Status: none  Cardiovascular status: hemodynamically stable and acceptable  Respiratory status: nonlabored ventilation, acceptable and nasal cannula  Hydration status: acceptable

## 2025-05-16 NOTE — NURSING NOTE
.Caregiver Telephone Number    Phone Number for Ride/Caregiver: LEOLA CAREY SPOUSE [phone]     Who will be staying with you post procedure for the next 24 hours? Name and Phone Number?: LEOLA CAREY SPOUSE [phone]

## 2025-05-16 NOTE — H&P
Orthopedic Preop H and P      Patient: Ronaldo Cantu    Date of Admission: 5/16/2025 10:13 AM    YOB: 1975    Medical Record Number: [id number]    Attending Physician: Charbel James MD    Consulting Physician: Charbel James MD      Chief Complaints:R shoulder SLAP and AC joint arthropathy      History of Present Illness: 49 y.o. male admitted to [facility], [place], operated by [facility]  for elective shoulder surgery.  Patient with longstanding shoulder pain unresponsive to conservative measures       Allergies   Allergen Reactions    Viloxazine Hcl Er Mental Status Change     Suicidal thoughts        Home Medications:  Medications Prior to Admission   Medication Sig Dispense Refill Last Dose/Taking    amphetamine-dextroamphetamine XR (ADDERALL XR) 25 MG 24 hr capsule Take 1 capsule by mouth Daily 30 capsule 0 5/15/2025    atorvastatin (LIPITOR) 20 MG tablet TAKE 1 TABLET BY MOUTH EVERY DAY IN THE EVENING (Patient taking differently: Take 1 tablet by mouth Every Night.) 90 tablet 3 5/15/2025    benzoyl peroxide 5 % external wash Use as directed by Dr. James 148 g 0 5/16/2025    FLUoxetine (PROzac) 40 MG capsule Take 1 capsule by mouth Daily. 90 capsule 0 5/15/2025    lamoTRIgine  MG tablet sustained-release 24 hour Take 1 tablet by mouth Daily. 90 tablet 0 5/15/2025    mupirocin (BACTROBAN) 2 % ointment Place 1 application in each nostril as directed twice daily, beginning 5 days before surgery 22 g 0 5/16/2025    ondansetron (ZOFRAN) 4 MG tablet Take 1 tablet by mouth Every 8 (Eight) Hours As Needed for post-op nausea. (Patient taking differently: Take 1 tablet by mouth Every 8 (Eight) Hours As Needed for Nausea or Vomiting.) 30 tablet 0 Past Month    tiZANidine (ZANAFLEX) 4 MG tablet TAKE 1 TABLET BY MOUTH EVERY 8 HOURS AS NEEDED FOR MUSCLE SPASMS. 270 tablet 0 5/15/2025         Past Medical History:   Diagnosis Date    ADHD (attention deficit hyperactivity disorder) January 2021    Felicia always  known srinivasa had a problem with focusing and paying attention and it progressively was getting worse    Atrial fibrillation     Chronic pain disorder 2010    Chronic back pain    Depression Danny 3    On 40mg of Prozac    Headache January 2022    Started happening after COVID booster    Low back pain 20 years ago    Nerve ablation September 2022    Nephrolithiasis     PTSD (post-traumatic stress disorder) November 4, 2023    Severe Right knee pain knowing its bone on bone but terrified to have surgery again        Past Surgical History:   Procedure Laterality Date    APPENDECTOMY  09/2010    CARDIAC ABLATION  11/2019    CERVICAL FUSION  10/2012    COLONOSCOPY  11/2021    JOINT REPLACEMENT  Dec 1 2022    Partial knee replacement Left knee    KNEE ARTHROPLASTY, PARTIAL REPLACEMENT  12/2022    KNEE ARTHROSCOPY  05/2021    SHOULDER ARTHROSCOPY      VASECTOMY  Dec 2014        Social History     Occupational History    Not on file   Tobacco Use    Smoking status: Never    Smokeless tobacco: Never   Vaping Use    Vaping status: Never Used   Substance and Sexual Activity    Alcohol use: Yes     Alcohol/week: 1.0 standard drink of alcohol     Types: 1 Cans of beer per week     Comment: 1 per day    Drug use: Never    Sexual activity: Yes     Partners: Female     Birth control/protection: Vasectomy      Social History     Social History Narrative    Not on file        Family History   Problem Relation Age of Onset    Heart attack Mother     Suicide Attempts Sister     Suicide Attempts Sister     Cancer Maternal Grandmother     Lung cancer Paternal Grandfather          Review of Systems:   HEENT  GI  Cardiac  Pulm  Neuro      All negative except as listed in PMHx    Physical Exam: 49 y.o. male  General Appearance:    Alert, cooperative, in no acute distress                   Vitals:    05/16/25 1035   BP: 117/81   BP Location: Left arm   Patient Position: Lying   Pulse: 73   Resp: 18   Temp: 97.8 °F (36.6 °C)   TempSrc: Temporal  "  SpO2: 98%   Weight: 84.8 kg (187 lb)   Height: 185.4 cm (73\")        Head:    Normocephalic, without obvious abnormality, atraumatic  CV: RRR no MRG  Pulm: CTA B  GI: soft, NT, ND, + BS           Diagnostic Tests:    I have reviewed the labs, radiology results and diagnostic studies:                  Assessment:  48 yo male with longstanding right shoulder pain unresponsive to conservative measures and findings consistent with SLAP and AC joint Arthropathy          Plan:  The patient voiced understanding of the risks, benefits, and alternative forms of treatment that were discussed and the patient consents to proceed with surgery as scheduled.             Charbel James MD  05/16/25  11:38 EDT          "

## 2025-05-16 NOTE — ANESTHESIA PROCEDURE NOTES
Peripheral Block      Patient reassessed immediately prior to procedure    Patient location during procedure: OR  Reason for block: at surgeon's request and post-op pain management  Performed by  CRNA/CAA: Reid Almodovar CRNA  Preanesthetic Checklist  Completed: patient identified, IV checked, site marked, risks and benefits discussed, surgical consent, monitors and equipment checked, pre-op evaluation and timeout performed  Prep:  Pt Position: supine  Sterile barriers:cap, gloves, mask and washed/disinfected hands  Prep: ChloraPrep  Patient monitoring: blood pressure monitoring, continuous pulse oximetry and EKG  Procedure  Performed under: general  Guidance:ultrasound guided and landmark technique  Images:still images obtained, printed/placed on chart    Laterality:right  Block Type:PECS I and PECS II  Injection Technique:single-shot  Needle Type:short-bevel  Needle Gauge:20 G  Resistance on Injection: none    Medications Used: bupivacaine PF (MARCAINE) 0.25 % injection - Injection   30 mL - 5/16/2025 10:52:00 AM  dexamethasone sodium phosphate injection - Injection   2 mg - 5/16/2025 10:53:00 AM      Medications  Preservative Free Saline:10ml  Comment:Block Injection:  Total volume of LA divided between Right and Left sided blocks         Post Assessment  Injection Assessment: negative aspiration for heme, incremental injection and no paresthesia on injection  Patient Tolerance:comfortable throughout block  Complications:no  Additional Notes  Interpectoral-Pectoserratus Plane   A high-frequency linear transducer, with sterile cover, was placed medial to the coracoid process in the paramedian sagittal plane. The transducer was moved caudally to the 4th rib and rotated slightly to allow an in-plane needle trajectory from medial to lateral. Pectoralis Major Muscle (PMM), Pectoralis Minor Muscle (PmM), Thoracoacromial Artery, Ribs, and Pleura were identified under ultrasound. The insertion site was prepped in  "sterile fashion and then localized with 2-5 ml of 1% Lidocaine. Using ultrasound-guidance, a 20-gauge B-Schmid 4\" Ultraplex 360 non-stimulating echogenic needle was advanced in plane until the tip of the needle was in the fascial plane between the PMM and PmM, lateral to the Thoracoacromial Artery. 1-3ml of preservative free normal saline was used to hydro-dissect the fascial planes. After the fascial plane was verified, 10ml local anesthetic (LA) was injected for Interpectoral fascial plane block. The needle was continued along the same path to the level of the 4th rib below PmM.  Initially preservative free normal saline was used to confirm needle position and then 20 ml of LA was injected for Pectoserratus fascial plane block. Aspiration every 5 ml to prevent intravascular injection. Injection was completed with negative aspiration of blood and negative intravascular injection. Injection pressures were normal with minimal resistance.     Performed by: Reid Almodovar CRNA            "

## 2025-05-16 NOTE — ANESTHESIA PROCEDURE NOTES
Peripheral Block      Patient reassessed immediately prior to procedure    Patient location during procedure: pre-op  Reason for block: at surgeon's request and post-op pain management  Performed by  CRNA/CAA: Reid Almodovar CRNA  Preanesthetic Checklist  Completed: patient identified, IV checked, site marked, risks and benefits discussed, surgical consent, monitors and equipment checked, pre-op evaluation and timeout performed  Prep:  Sterile barriers:cap, gloves, mask and washed/disinfected hands  Prep: ChloraPrep  Patient monitoring: blood pressure monitoring, continuous pulse oximetry and EKG  Procedure    Sedation: yes  Performed under: local infiltration  Guidance:ultrasound guided    ULTRASOUND INTERPRETATION.  Using ultrasound guidance a 20 G gauge needle was placed in close proximity to the nerve, at which point, under ultrasound guidance anesthetic was injected in the area of the nerve and spread of the anesthesia was seen on ultrasound in close proximity thereto.  There were no abnormalities seen on ultrasound; a digital image was taken; and the patient tolerated the procedure with no complications. Images:still images obtained, printed/placed on chart    Laterality:right  Block Type:interscalene  Injection Technique:catheter  Needle Type:Tuohy and echogenic  Needle Gauge:18 G  Resistance on Injection: none  Catheter Size:20 G (20g)  Cath Depth at skin: 10 cm    Medications Used: bupivacaine PF (MARCAINE) 0.25 % injection - Injection   15 mL - 5/16/2025 10:52:00 AM  fentaNYL citrate (PF) (SUBLIMAZE) injection - Intravenous   100 mcg - 5/16/2025 10:52:00 AM      Post Assessment  Injection Assessment: negative aspiration for heme, no paresthesia on injection and incremental injection  Patient Tolerance:comfortable throughout block  Complications:no  Additional Notes  CATHETER  A high-frequency linear transducer, with sterile cover, was placed in the supraclavicular fossa to identify the subclavian artery  "and trunks and divisions of the brachial plexus. The transducer was then moved in a cephalad orientation with a slight rotation to continue visualization of the brachial plexus from the trunks and divisions, on to the C5-C7 roots. The insertion site was prepped and draped in sterile fashion. Skin and cutaneous tissue was infiltrated with 2-5 ml of 1% Lidocaine. Using ultrasound-guidance, an 18-gauge Contiplex Ultra 360 Touhy needle was advanced in plane from lateral to medial. Preservative-free normal saline was utilized for hydro-dissection of tissue, advancement of Touhy, and to confirm final needle placement at the fascial plane between the middle scalene muscle and sheath of the brachial plexus (C5-C7). A 20-gauge Contiplex Echo catheter was placed through the needle and advance out the tip of the Touhy 3-5 cm with the \"Mina Flip\". The Touhy needle was then removed, and final catheter position verified lateral to the brachial plexus with local anesthetic (LA) and ultrasound visualization. The catheter was secured in the usual fashion with skin glue, benzoin, steri-strips, CHG tegaderm and label noting \"Nerve Block Catheter\". Jerk tape applied at yellow connector and catheter connection. All LA was injected in increments of 3-5 ml after catheter secured. Aspiration every 5 ml to prevent intravascular injection. Injection was completed with negative aspiration of blood and negative intravascular injection. Injection pressures were normal with minimal resistance.   Performed by: Reid Almodovar CRNA            "

## 2025-05-16 NOTE — ANESTHESIA PREPROCEDURE EVALUATION
Anesthesia Evaluation     Patient summary reviewed and Nursing notes reviewed                Airway   Mallampati: II  TM distance: >3 FB  Neck ROM: full  No difficulty expected  Dental - normal exam     Pulmonary - negative pulmonary ROS and normal exam   Cardiovascular - normal exam    (+) dysrhythmias Atrial Fib, hyperlipidemia      Neuro/Psych  (+) headaches, psychiatric history Anxiety, Depression, Bipolar, ADHD and PTSD  GI/Hepatic/Renal/Endo    (+) renal disease- stones    Musculoskeletal     (+) back pain, chronic pain  Abdominal  - normal exam    Bowel sounds: normal.   Substance History - negative use     OB/GYN negative ob/gyn ROS         Other   arthritis,                   Anesthesia Plan    ASA 3     general     (INTERSCALENE CATHETER FOR POSTOP PAIN)  intravenous induction     Anesthetic plan, risks, benefits, and alternatives have been provided, discussed and informed consent has been obtained with: patient.    Plan discussed with CRNA.      CODE STATUS:

## 2025-05-22 ENCOUNTER — TELEMEDICINE (OUTPATIENT)
Dept: PSYCHIATRY | Facility: CLINIC | Age: 50
End: 2025-05-22
Payer: COMMERCIAL

## 2025-05-22 DIAGNOSIS — F90.2 ATTENTION DEFICIT HYPERACTIVITY DISORDER (ADHD), COMBINED TYPE: Primary | ICD-10-CM

## 2025-05-22 DIAGNOSIS — F31.60 BIPOLAR AFFECTIVE DISORDER, MIXED: ICD-10-CM

## 2025-05-22 DIAGNOSIS — Z79.899 MEDICATION MANAGEMENT: ICD-10-CM

## 2025-05-22 DIAGNOSIS — F33.8 SEASONAL DEPRESSION: ICD-10-CM

## 2025-05-22 RX ORDER — DEXTROAMPHETAMINE SACCHARATE, AMPHETAMINE ASPARTATE MONOHYDRATE, DEXTROAMPHETAMINE SULFATE AND AMPHETAMINE SULFATE 6.25; 6.25; 6.25; 6.25 MG/1; MG/1; MG/1; MG/1
25 CAPSULE, EXTENDED RELEASE ORAL DAILY
Qty: 30 CAPSULE | Refills: 0 | Status: SHIPPED | OUTPATIENT
Start: 2025-05-22

## 2025-05-22 NOTE — PROGRESS NOTES
This provider is located at the [facility] (through [facility]), [address], [place], [address] using a secure video visit through [employer]. Patient is being seen remotely via telehealth at their home address in [place], and stated they are in a secure environment for this session. The patient's condition being consulted/diagnosed/treated is appropriate for telemedicine. The provider identified herself as well as her credentials.   The patient, and/or patients guardian, consent to be seen remotely, and when consent is given they understand that the consent allows for patient identifiable information to be sent to a third party as needed. They may refuse to be seen remotely at any time. The electronic data is encrypted and password protected, and the patient and/or guardian has been advised of the potential risks to privacy not withstanding such measures.   The use of a video visit has been reviewed with the patient and verbal informed consent has been obtained.   Patient identifiers used: Name and .      Subjective   Ronaldo Cantu is a 49 y.o. male who presents today for follow up    Chief Complaint:    Chief Complaint   Patient presents with    Anxiety    Depression    Med Management    Irritable    ADHD        History of Present Illness:   History of Present Illness  Patient is a 49-year-old male presenting for 1 month follow-up related to depression, anxiety, irritability and ADHD.  He voices compliance with medications, denies side effects.  Denies cardiac symptoms with use of Adderall, was recently assessed by cardiology, received surgery clearance for upcoming shoulder surgery next month.  Cardiology notes reviewed, EKG unchanged.  No concerns.  Remains up on Lamictal and Prozac, denies side effects, extended release form of Lamictal has been costly.  Denies episodes consistent with lesa or psychosis.  Denies mood lability or agitation.  States mood has  "been \"fine, good.\"  Adderall remains effective in addressing concentration deficits, helps with \"focus and attention, slows my mind down.\"  Regarding sleep \"I am fine, I cannot discuss that right now with the surgery.\"  Rotator cuff surgery last week, has already seen improvements in shoulder pain but does make sleeping difficulty at night, warm currently in a sling.  PHQ increase in 0-2, indicating minimal depression for his rates as 0/10 on average.  JACQUELINE made the same score 0 negative for anxiety which patient rates a 0/10 on average.  He denies SI, HI, SIB, or hallucinations currently and is convincing.  Denies impulsivity's.  States he has utilized oxycodone, only given 10 tablets for pain but does not take in conjunction with Adderall.  No depression currently.  Denies alternative medical concerns.    Patient resides in a home with his wife of 8 years whom he cites as supportive.  He has 1 biological child and 2 stepchildren, all adults.  He has a college degree in biology and is now retired.  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  This has been stressful.  Also some financial stress, stress surrounding disability.  Denominational Spiritism preference.  Enjoys golfing.  Denies drug or alcohol use.  Some stress surrounding disability case.  Now filing an appeal.  Has been busy working on home projects. New outside decks in place. Spending holiday weekend with family      The following portions of the patient's history were reviewed and updated as appropriate: allergies, current medications, past family history, past medical history, past social history, past surgical history and problem list.    Past Psychiatric History:  Began Treatment: 7 years ago for anger management  Diagnoses: unsure  Psychiatrist:Denies  Therapist: previously  Admission History:Denies  Medication Trials: effexor straterra (more irritable) and quelbree( suicidal), clonidine (increased thirst), wellbutrin (made worse), vyvanse (side effects-jaw clenching), " adderall 7.5 BID (wore off), buspar 10 (headaches/sedating)  Self Harm: Denies  Suicide Attempts:Denies   Psychosis, Anxiety, Depression:  N/A    Past Medical History:  Past Medical History:   Diagnosis Date    ADHD (attention deficit hyperactivity disorder) 2021    Srinivasa always known srinivasa had a problem with focusing and paying attention and it progressively was getting worse    Atrial fibrillation     Chronic pain disorder     Chronic back pain    Depression Danny 3    On 40mg of Prozac    Headache 2022    Started happening after COVID booster    Low back pain 20 years ago    Nerve ablation 2022    Nephrolithiasis     PTSD (post-traumatic stress disorder) 2023    Severe Right knee pain knowing its bone on bone but terrified to have surgery again       Substance Abuse History:   Types:Denies all, including illicit  Withdrawal Symptoms:Denies  Longest Period Sober:Not Applicable   AA: Not applicable     Social History:  Social History     Socioeconomic History    Marital status:     Number of children: 3   Tobacco Use    Smoking status: Never    Smokeless tobacco: Never   Vaping Use    Vaping status: Never Used   Substance and Sexual Activity    Alcohol use: Yes     Alcohol/week: 1.0 standard drink of alcohol     Types: 1 Cans of beer per week     Comment: 1 per day    Drug use: Never    Sexual activity: Yes     Partners: Female     Birth control/protection: Vasectomy       Family History:  Family History   Problem Relation Age of Onset    Heart attack Mother     Suicide Attempts Sister     Suicide Attempts Sister     Cancer Maternal Grandmother     Lung cancer Paternal Grandfather        Past Surgical History:  Past Surgical History:   Procedure Laterality Date    APPENDECTOMY  2010    BICEPS TENDON REPAIR Right 2025    Procedure: SHOULDER ARTHROSCOPY WITH BICEPS TENODESIS RIGHT;  Surgeon: Charbel James MD;  Location: [facility];  Service:  Orthopedics;  Laterality: Right;    CARDIAC ABLATION  11/2019    CERVICAL FUSION  10/2012    COLONOSCOPY  11/2021    JOINT REPLACEMENT  Dec 1 2022    Partial knee replacement Left knee    KNEE ARTHROPLASTY, PARTIAL REPLACEMENT  12/2022    KNEE ARTHROSCOPY  05/2021    RESECTION DISTAL CLAVICLE Right 5/16/2025    Procedure: OPEN DISTAL CLAVICAL EXCISION;  Surgeon: Charbel James MD;  Location: [facility];  Service: Orthopedics;  Laterality: Right;    SHOULDER ARTHROSCOPY      VASECTOMY  Dec 2014       Problem List:  Patient Active Problem List   Diagnosis    Paroxysmal atrial fibrillation    Mixed hyperlipidemia    Primary osteoarthritis of both knees    Degeneration of lumbar or lumbosacral intervertebral disc    Toenail fungus    Bipolar affective disorder    Depression    Attention deficit hyperactivity disorder (ADHD)       Allergy:   Allergies   Allergen Reactions    Viloxazine Hcl Er Mental Status Change     Suicidal thoughts        Current Medications:   Current Outpatient Medications   Medication Sig Dispense Refill    amphetamine-dextroamphetamine XR (ADDERALL XR) 25 MG 24 hr capsule Take 1 capsule by mouth Daily 30 capsule 0    atorvastatin (LIPITOR) 20 MG tablet TAKE 1 TABLET BY MOUTH EVERY DAY IN THE EVENING (Patient taking differently: Take 1 tablet by mouth Every Night.) 90 tablet 3    FLUoxetine (PROzac) 40 MG capsule Take 1 capsule by mouth Daily. 90 capsule 0    lamoTRIgine  MG tablet sustained-release 24 hour Take 1 tablet by mouth Daily. 90 tablet 0    ondansetron (ZOFRAN) 4 MG tablet Take 1 tablet by mouth Every 8 (Eight) Hours As Needed for post-op nausea. 30 tablet 0    oxyCODONE (ROXICODONE) 5 MG immediate release tablet Take 1 tablet by mouth Every 6 (Six) Hours As Needed for Moderate Pain. 40 tablet 0    tiZANidine (ZANAFLEX) 4 MG tablet TAKE 1 TABLET BY MOUTH EVERY 8 HOURS AS NEEDED FOR MUSCLE SPASMS. 270 tablet 0     No current facility-administered medications for this visit.        Review of Systems:    Review of Systems   Constitutional: Negative.    HENT: Negative.     Eyes: Negative.    Respiratory: Negative.     Cardiovascular: Negative.    Gastrointestinal: Negative.    Endocrine: Negative.    Genitourinary: Negative.         Kidney stone   Musculoskeletal:  Positive for back pain.   Skin: Negative.    Allergic/Immunologic: Negative.    Neurological: Negative.  Negative for seizures.   Hematological: Negative.    Psychiatric/Behavioral:  Positive for sleep disturbance and stress.          Physical Exam:   Physical Exam  Constitutional:       Appearance: Normal appearance. He is normal weight.   HENT:      Head: Normocephalic.      Nose: Nose normal.   Pulmonary:      Effort: Pulmonary effort is normal.   Musculoskeletal:         General: Normal range of motion.      Cervical back: Normal range of motion.   Neurological:      General: No focal deficit present.      Mental Status: He is alert and oriented to person, place, and time. Mental status is at baseline.   Psychiatric:         Attention and Perception: Attention and perception normal.         Mood and Affect: Mood and affect normal.         Speech: Speech normal.         Behavior: Behavior normal. Behavior is cooperative.         Thought Content: Thought content normal.         Cognition and Memory: Cognition normal. Memory is impaired.         Judgment: Judgment normal.     Vitals:  There were no vitals taken for this visit. There is no height or weight on file to calculate BMI.  Due to extenuating circumstances and possible current health risks associated with the patient being present in a clinical setting (with current health restrictions in place in regards to possible COVID 19 transmission/exposure), the patient was seen remotely today via a MyChart Video Visit through [facility].  Unable to obtain vital signs due to nature of remote visit.  Height stated at 6ft1 inches.  Weight stated at 183 pounds.    Last 3 Blood Pressure  Readings:  BP Readings from Last 3 Encounters:   05/16/25 99/56   04/04/25 110/68   03/11/25 110/70     PHQ-9 Depression Screening  Little interest or pleasure in doing things? (Patient-Rptd) Not at all   Feeling down, depressed, or hopeless? (Patient-Rptd) Not at all   PHQ-2 Total Score (Patient-Rptd) 0   Trouble falling or staying asleep, or sleeping too much? (Patient-Rptd) Not at all   Feeling tired or having little energy? (Patient-Rptd) Over half   Poor appetite or overeating? (Patient-Rptd) Not at all   Feeling bad about yourself - or that you are a failure or have let yourself or your family down? (Patient-Rptd) Not at all   Trouble concentrating on things, such as reading the newspaper or watching television? (Patient-Rptd) Not at all   Moving or speaking so slowly that other people could have noticed? Or the opposite - being so fidgety or restless that you have been moving around a lot more than usual? (Patient-Rptd) Not at all   Thoughts that you would be better off dead, or of hurting yourself in some way? (Patient-Rptd) Not at all   PHQ-9 Total Score (Patient-Rptd) 2   If you checked off any problems, how difficult have these problems made it for you to do your work, take care of things at home, or get along with other people? (Patient-Rptd) Not difficult at all     JACQUELINE: 0    Mental Status Exam:   Hygiene:   good  Cooperation:  Cooperative  Eye Contact:  Good  Psychomotor Behavior:  Appropriate  Affect:  Appropriate  Mood: normal  Hopelessness: Denies  Speech:  Normal  Thought Process:  Goal directed  Thought Content:  Normal  Suicidal:  None  Homicidal:  None  Hallucinations:  None  Delusion:  None  Memory:  Intact  Orientation:  Grossly intact  Reliability:  good  Insight:  Good  Judgement:  Fair  Impulse Control:  Fair  Physical/Medical Issues:   back surgeries, knee surgeries, cardiac ablation 2019        Lab Results:   Pre-Admission Testing on 05/02/2025   Component Date Value Ref Range Status     Hemoglobin A1C 05/02/2025 5.50  4.80 - 5.60 % Final    Protime 05/02/2025 13.3  12.2 - 15.3 Seconds Final    INR 05/02/2025 0.95  0.89 - 1.12 Final    PTT 05/02/2025 29.4  22.0 - 39.0 seconds Final    Glucose 05/02/2025 95  65 - 99 mg/dL Final    BUN 05/02/2025 15  6 - 20 mg/dL Final    Creatinine 05/02/2025 0.91  0.76 - 1.27 mg/dL Final    Sodium 05/02/2025 139  136 - 145 mmol/L Final    Potassium 05/02/2025 4.3  3.5 - 5.2 mmol/L Final    Slight hemolysis detected by analyzer. Result may be falsely elevated.    Chloride 05/02/2025 102  98 - 107 mmol/L Final    CO2 05/02/2025 29.0  22.0 - 29.0 mmol/L Final    Calcium 05/02/2025 9.3  8.6 - 10.5 mg/dL Final    Total Protein 05/02/2025 6.8  6.0 - 8.5 g/dL Final    Albumin 05/02/2025 4.3  3.5 - 5.2 g/dL Final    ALT (SGPT) 05/02/2025 40  1 - 41 U/L Final    AST (SGOT) 05/02/2025 30  1 - 40 U/L Final    Alkaline Phosphatase 05/02/2025 101  39 - 117 U/L Final    Total Bilirubin 05/02/2025 0.3  0.0 - 1.2 mg/dL Final    Globulin 05/02/2025 2.5  gm/dL Final    Calculated Result    A/G Ratio 05/02/2025 1.7  g/dL Final    BUN/Creatinine Ratio 05/02/2025 16.5  7.0 - 25.0 Final    Anion Gap 05/02/2025 8.0  5.0 - 15.0 mmol/L Final    eGFR 05/02/2025 102.0  >60.0 mL/min/1.73 Final    WBC 05/02/2025 5.98  3.40 - 10.80 10*3/mm3 Final    RBC 05/02/2025 4.99  4.14 - 5.80 10*6/mm3 Final    Hemoglobin 05/02/2025 14.6  13.0 - 17.7 g/dL Final    Hematocrit 05/02/2025 44.2  37.5 - 51.0 % Final    MCV 05/02/2025 88.6  79.0 - 97.0 fL Final    MCH 05/02/2025 29.3  26.6 - 33.0 pg Final    MCHC 05/02/2025 33.0  31.5 - 35.7 g/dL Final    RDW 05/02/2025 13.2  12.3 - 15.4 % Final    RDW-SD 05/02/2025 42.8  37.0 - 54.0 fl Final    MPV 05/02/2025 10.6  6.0 - 12.0 fL Final    Platelets 05/02/2025 168  140 - 450 10*3/mm3 Final    Neutrophil % 05/02/2025 53.0  42.7 - 76.0 % Final    Lymphocyte % 05/02/2025 33.1  19.6 - 45.3 % Final    Monocyte % 05/02/2025 8.0  5.0 - 12.0 % Final    Eosinophil %  05/02/2025 5.4  0.3 - 6.2 % Final    Basophil % 05/02/2025 0.3  0.0 - 1.5 % Final    Immature Grans % 05/02/2025 0.2  0.0 - 0.5 % Final    Neutrophils, Absolute 05/02/2025 3.17  1.70 - 7.00 10*3/mm3 Final    Lymphocytes, Absolute 05/02/2025 1.98  0.70 - 3.10 10*3/mm3 Final    Monocytes, Absolute 05/02/2025 0.48  0.10 - 0.90 10*3/mm3 Final    Eosinophils, Absolute 05/02/2025 0.32  0.00 - 0.40 10*3/mm3 Final    Basophils, Absolute 05/02/2025 0.02  0.00 - 0.20 10*3/mm3 Final    Immature Grans, Absolute 05/02/2025 0.01  0.00 - 0.05 10*3/mm3 Final    nRBC 05/02/2025 0.0  0.0 - 0.2 /100 WBC Final   Office Visit on 03/11/2025   Component Date Value Ref Range Status    WBC 03/11/2025 7.15  3.40 - 10.80 10*3/mm3 Final    RBC 03/11/2025 5.10  4.14 - 5.80 10*6/mm3 Final    Hemoglobin 03/11/2025 15.2  13.0 - 17.7 g/dL Final    Hematocrit 03/11/2025 45.6  37.5 - 51.0 % Final    MCV 03/11/2025 89.4  79.0 - 97.0 fL Final    MCH 03/11/2025 29.8  26.6 - 33.0 pg Final    MCHC 03/11/2025 33.3  31.5 - 35.7 g/dL Final    RDW 03/11/2025 12.9  12.3 - 15.4 % Final    RDW-SD 03/11/2025 42.7  37.0 - 54.0 fl Final    MPV 03/11/2025 11.0  6.0 - 12.0 fL Final    Platelets 03/11/2025 204  140 - 450 10*3/mm3 Final    Glucose 03/11/2025 92  65 - 99 mg/dL Final    BUN 03/11/2025 16  6 - 20 mg/dL Final    Creatinine 03/11/2025 1.04  0.76 - 1.27 mg/dL Final    Sodium 03/11/2025 140  136 - 145 mmol/L Final    Potassium 03/11/2025 4.6  3.5 - 5.2 mmol/L Final    Chloride 03/11/2025 104  98 - 107 mmol/L Final    CO2 03/11/2025 25.7  22.0 - 29.0 mmol/L Final    Calcium 03/11/2025 9.1  8.6 - 10.5 mg/dL Final    Total Protein 03/11/2025 6.9  6.0 - 8.5 g/dL Final    Albumin 03/11/2025 3.9  3.5 - 5.2 g/dL Final    ALT (SGPT) 03/11/2025 37  1 - 41 U/L Final    AST (SGOT) 03/11/2025 30  1 - 40 U/L Final    Alkaline Phosphatase 03/11/2025 86  39 - 117 U/L Final    Total Bilirubin 03/11/2025 0.3  0.0 - 1.2 mg/dL Final    Globulin 03/11/2025 3.0  gm/dL Final     A/G Ratio 03/11/2025 1.3  g/dL Final    BUN/Creatinine Ratio 03/11/2025 15.4  7.0 - 25.0 Final    Anion Gap 03/11/2025 10.3  5.0 - 15.0 mmol/L Final    eGFR 03/11/2025 86.9  >60.0 mL/min/1.73 Final    Total Cholesterol 03/11/2025 138  0 - 200 mg/dL Final    Triglycerides 03/11/2025 62  0 - 150 mg/dL Final    HDL Cholesterol 03/11/2025 49  40 - 60 mg/dL Final    LDL Cholesterol  03/11/2025 76  0 - 100 mg/dL Final    VLDL Cholesterol 03/11/2025 13  5 - 40 mg/dL Final    LDL/HDL Ratio 03/11/2025 1.56   Final    TSH 03/11/2025 3.140  0.270 - 4.200 uIU/mL Final    PSA 03/11/2025 0.898  0.000 - 4.000 ng/mL Final    Vitamin B-12 03/11/2025 409  211 - 946 pg/mL Final    Color 03/11/2025 Yellow  Yellow, Straw, Dark Yellow, Tali Final    Clarity, UA 03/11/2025 Clear  Clear Final    Specific Gravity  03/11/2025 1.015  1.005 - 1.030 Final    pH, Urine 03/11/2025 6.0  5.0 - 8.0 Final    Leukocytes 03/11/2025 Negative  Negative Final    Nitrite, UA 03/11/2025 Negative  Negative Final    Protein, POC 03/11/2025 Negative  Negative mg/dL Final    Glucose, UA 03/11/2025 Negative  Negative mg/dL Final    Ketones, UA 03/11/2025 Negative  Negative Final    Urobilinogen, UA 03/11/2025 Normal  Normal, 0.2 E.U./dL Final    Bilirubin 03/11/2025 Negative  Negative Final    Blood, UA 03/11/2025 Negative  Negative Final    Lot Number 03/11/2025 [id number]   Final    Expiration Date 03/11/2025 [id number]   Final         Assessment & Plan   Problems Addressed this Visit          Mental Health    Attention deficit hyperactivity disorder (ADHD) - Primary    Relevant Medications    amphetamine-dextroamphetamine XR (ADDERALL XR) 25 MG 24 hr capsule     Other Visit Diagnoses         Seasonal depression        Relevant Medications    amphetamine-dextroamphetamine XR (ADDERALL XR) 25 MG 24 hr capsule      Bipolar affective disorder, mixed        Relevant Medications    amphetamine-dextroamphetamine XR (ADDERALL XR) 25 MG 24 hr capsule      Medication  management        Relevant Medications    amphetamine-dextroamphetamine XR (ADDERALL XR) 25 MG 24 hr capsule          Diagnoses         Codes Comments      Attention deficit hyperactivity disorder (ADHD), combined type    -  Primary ICD-10-CM: F90.2  ICD-9-CM: 314.01       Seasonal depression     ICD-10-CM: F33.8  ICD-9-CM: 296.99       Bipolar affective disorder, mixed     ICD-10-CM: F31.60  ICD-9-CM: 296.60       Medication management     ICD-10-CM: Z79.899  ICD-9-CM: V58.69             Visit Diagnoses:    ICD-10-CM ICD-9-CM   1. Attention deficit hyperactivity disorder (ADHD), combined type  F90.2 314.01   2. Seasonal depression  F33.8 296.99   3. Bipolar affective disorder, mixed  F31.60 296.60   4. Medication management  Z79.899 V58.69         UDS current.  Juan David pulled and appropriate per report #[id number].  Adderall refilled.  Remain upon Prozac and Lamictal at current dosages, states he does not need refilled at this point.  Discussed when he needs a refill to contact the office, we will send Lamictal 100 mg, patient to take 1/2 tablet twice daily of immediate release at that time in hopes of avoiding substantial cost. Previously educated upon side effects with use of these medications as well as when to present for emergency services, denies at this time. Instructions sent regarding use of medications attached to visit of initial prescribing date.  Follow up in 4 to 8 weeks or sooner if needed.    Risks, benefits, alternatives discussed with patient including GI upset, nausea vomiting diarrhea, theoretical decrease of seizure threshold predisposing the patient to a slightly higher seizure risk, headaches, sexual dysfunction, serotonin syndrome, bleeding risk, increased suicidality in patients 24 years and younger, switching to lesa/hypomania.  After discussion of these risks and benefits, the patient voiced understanding and agreed to proceed.       The patient is being prescribed a controlled substance  as part of the treatment plan. The patient has been educated of appropriate use of the medication(s), including risks and side effects such as insomnia, headache, exacerbation of tics, nervousness, irritability, overstimulation, tremor, dizziness, anorexia or change in appetite, nausea, dry mouth, constipation, diarrhea, weight loss, sexual dysfunction, psychotic episodes, seizures, palpitations, tachycardia, hypertension, rare activation (activation of hypomania, lesa, and/or suicidal ideations), cardiovascular adverse effects including sudden death (especially patients with pre-existing structural abnormalities often associated with a family history of cardiac disease), may slow normal growth in children, weight gain is reported but not expected, sedation is possible but activation is much more common, metabolic adversities, the risk of tolerance and dependence, and overdose among others. Without the prescribed medication for ADHD, it is reported that the patient has problems with attention and focus including being easily distracted, easily losing objects, trouble with time management, trouble completing tasks because of distractions, procrastination, indecisiveness, careless mistakes in daily activities/work, and not finishing jobs that are started. The goals and objectives with treatment have been discussed including management of reported symptoms of ADHD, to minimize the impact of ADHD symptoms on patient function while maximizing the patient's ability to compensate or cope with any remaining difficulties, and to assist the patient with being successful and productive in their life/daily pursuits. The patient denies any side effects, no cardiovascular symptoms including palpitations or hypertension, no development or worsening of insomnia, and no development or worsening of anxiety symptoms on the medication. Due to the reported problems without the medication usage, as well as the reported significant  improvement in symptoms with the medication usage, the patient requests to remain on the prescribed medication for ADHD and does not feel tapering or coming off of medication at this time is appropriate. The reported symptoms of ADHD, any reported resolution of symptoms, and the necessity and appropriateness for continued treatment with a controlled substance will be reevaluated at each encounter. Patient is also informed that the medication is to be used by the patient only, the medication is to be used only as directed, and the medication should not be combined with other substances, OTC or prescription, unless directed by a Provider/Prescriber. The patient verbalized understanding and agreement with this in their own words, and wish to continue with the current treatment plan.       GOALS:  Short Term Goals: Patient will be compliant with medication, and patient will have no significant medication related side effects.  Patient will be engaged in psychotherapy as indicated.  Patient will report subjective improvement of symptoms.  Long term goals: To stabilize mood and treat/improve subjective symptoms, the patient will stay out of the hospital, the patient will be at an optimal level of functioning, and the patient will take all medications as prescribed.  The patient/guardian verbalized understanding and agreement with goals that were mutually set.      TREATMENT PLAN: Continue supportive psychotherapy efforts and medications as indicated.  Pharmacological and Non-Pharmacological treatment options discussed during today's visit. Patient/Guardian acknowledged and verbally consented with current treatment plan and was educated on the importance of compliance with treatment and follow-up appointments.      MEDICATION ISSUES:  Discussed medication options and treatment plan of prescribed medication as well as the risks, benefits, any black box warnings, and side effects including potential falls, possible impaired  driving, and metabolic adversities among others. Patient is agreeable to call the office with any worsening of symptoms or onset of side effects, or if any concerns or questions arise.  The contact information for the office is made available to the patient. Patient is agreeable to call 911 or go to the nearest ER should they begin having any SI/HI, or if any urgent concerns arise. No medication side effects or related complaints today.     MEDS ORDERED DURING VISIT:  New Medications Ordered This Visit   Medications    amphetamine-dextroamphetamine XR (ADDERALL XR) 25 MG 24 hr capsule     Sig: Take 1 capsule by mouth Daily     Dispense:  30 capsule     Refill:  0     No early refills       Follow Up Appointment:   Return in about 2 months (around 7/22/2025) for Recheck.             This document has been electronically signed by DEVEN Murillo  May 22, 2025 12:34 EDT    Some of the data in this electronic note has been brought forward from a previous encounter, any necessary changes have been made, it has been reviewed by this APRN, and it is accurate.    Dictated Utilizing Dragon Dictation: Part of this note may be an electronic transcription/translation of spoken language to printed text using the Dragon Dictation System.

## 2025-06-12 DIAGNOSIS — Z79.899 MEDICATION MANAGEMENT: ICD-10-CM

## 2025-06-12 DIAGNOSIS — F33.8 SEASONAL DEPRESSION: ICD-10-CM

## 2025-06-16 RX ORDER — FLUOXETINE HYDROCHLORIDE 40 MG/1
40 CAPSULE ORAL DAILY
Qty: 90 CAPSULE | Refills: 0 | Status: SHIPPED | OUTPATIENT
Start: 2025-06-16

## 2025-06-19 DIAGNOSIS — Z79.899 MEDICATION MANAGEMENT: ICD-10-CM

## 2025-06-19 DIAGNOSIS — F31.60 BIPOLAR AFFECTIVE DISORDER, MIXED: ICD-10-CM

## 2025-06-19 RX ORDER — LAMOTRIGINE 100 MG/1
1 TABLET, EXTENDED RELEASE ORAL DAILY
Qty: 90 TABLET | Refills: 0 | Status: SHIPPED | OUTPATIENT
Start: 2025-06-19

## 2025-06-25 DIAGNOSIS — Z79.899 MEDICATION MANAGEMENT: ICD-10-CM

## 2025-06-25 DIAGNOSIS — F90.2 ATTENTION DEFICIT HYPERACTIVITY DISORDER (ADHD), COMBINED TYPE: ICD-10-CM

## 2025-06-25 RX ORDER — DEXTROAMPHETAMINE SACCHARATE, AMPHETAMINE ASPARTATE MONOHYDRATE, DEXTROAMPHETAMINE SULFATE AND AMPHETAMINE SULFATE 6.25; 6.25; 6.25; 6.25 MG/1; MG/1; MG/1; MG/1
25 CAPSULE, EXTENDED RELEASE ORAL DAILY
Qty: 30 CAPSULE | Refills: 0 | Status: SHIPPED | OUTPATIENT
Start: 2025-06-25

## 2025-07-14 ENCOUNTER — TELEMEDICINE (OUTPATIENT)
Dept: PSYCHIATRY | Facility: CLINIC | Age: 50
End: 2025-07-14
Payer: COMMERCIAL

## 2025-07-14 DIAGNOSIS — Z79.899 MEDICATION MANAGEMENT: ICD-10-CM

## 2025-07-14 DIAGNOSIS — F90.2 ATTENTION DEFICIT HYPERACTIVITY DISORDER (ADHD), COMBINED TYPE: Primary | ICD-10-CM

## 2025-07-14 DIAGNOSIS — F31.60 BIPOLAR AFFECTIVE DISORDER, MIXED: ICD-10-CM

## 2025-07-14 DIAGNOSIS — F33.8 SEASONAL DEPRESSION: ICD-10-CM

## 2025-07-14 PROCEDURE — 99214 OFFICE O/P EST MOD 30 MIN: CPT

## 2025-07-14 PROCEDURE — 96127 BRIEF EMOTIONAL/BEHAV ASSMT: CPT

## 2025-07-14 RX ORDER — DEXTROAMPHETAMINE SACCHARATE, AMPHETAMINE ASPARTATE MONOHYDRATE, DEXTROAMPHETAMINE SULFATE AND AMPHETAMINE SULFATE 6.25; 6.25; 6.25; 6.25 MG/1; MG/1; MG/1; MG/1
25 CAPSULE, EXTENDED RELEASE ORAL DAILY
Qty: 30 CAPSULE | Refills: 0 | Status: SHIPPED | OUTPATIENT
Start: 2025-07-24

## 2025-07-14 NOTE — PROGRESS NOTES
"This provider is located at the [facility] (through [facility]), [address], [facility], [address] using a secure video visit through [employer]. Patient is being seen remotely via telehealth at their home address in [place], and stated they are in a secure environment for this session. The patient's condition being consulted/diagnosed/treated is appropriate for telemedicine. The provider identified herself as well as her credentials.   The patient, and/or patients guardian, consent to be seen remotely, and when consent is given they understand that the consent allows for patient identifiable information to be sent to a third party as needed. They may refuse to be seen remotely at any time. The electronic data is encrypted and password protected, and the patient and/or guardian has been advised of the potential risks to privacy not withstanding such measures.   The use of a video visit has been reviewed with the patient and verbal informed consent has been obtained.   Patient identifiers used: Name and .      Subjective   Ronaldo Cantu is a 49 y.o. male who presents today for follow up    Chief Complaint:    Chief Complaint   Patient presents with    Anxiety    Depression    Med Management    ADHD        History of Present Illness:   History of Present Illness  Patient is a 49-year-old male presenting for 6 week follow-up related to depression, anxiety, irritability and ADHD.  He voices compliance with medications, denies side effects.  Unfortunately did experience some depression a few weeks ago \"feeling stuck in a chair.\"  This is due to recent shoulder surgery.  States \"I struggled after surgery.\"  Indicates this is lessened now.  Unfortunately is struggling with sleep \"without muscle relaxers sleep is restless\" and cites 4 hours nightly on average.  States \"my shoulder hurts and I have to turn over.\"  When experiencing depression felt \"blah, stuff needed to be " "done but physical limitations.\"  Denies both passive and active SI currently and is convincing, denies relevant during that time either with intent or plan.  Denies HI, SIB, or hallucinations.  Expresses feeling \"tired of not doing anything, sitting is just hard for me.\"  States without use of Adderall \"if I do not take it I feel really tired.\"  This does help with feeling restless.  Has experienced \"patients that is not there because of everything I need to do.\"  Anxiety likely worsened due to inability to perform tasks previously performed at home.  Does not indicate seasonal components to depression currently.  Denies mood lability, lesa, or agitation \"always well on that front and day-to-day stuff.\"  PHQ increase in 2-4, still minimal for depression which patient rates at 0/10 on average.  JACQUEILNE increase in 0-3, still minimal for anxiety patient rates a 0/10 on average.  Medically he has had a follow-up with orthopedics \"they state on further long that I should be.\"  States \"I need an answer on disability\" which is contributing to stress currently.  Awaits court date.  Currently in physical therapy twice weekly.    Patient resides in a home with his wife of 8 years whom he cites as supportive.  He has 1 biological child and 2 stepchildren, all adults.  He has a college degree in biology and is now retired.  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  This has been stressful.  Also some financial stress, stress surrounding disability.  [employer]ism [employer] preference.  Enjoys golfing.  Denies drug or alcohol use.  Some stress surrounding disability case.  Now filing an appeal.  Has been working in going through [employer] cards.      The following portions of the patient's history were reviewed and updated as appropriate: allergies, current medications, past family history, past medical history, past social history, past surgical history and problem list.    Past Psychiatric History:  Began Treatment:7 years ago for anger " management  Diagnoses:unsure  Psychiatrist:Denies  Therapist:previously  Admission History:Denies  Medication Trials:effexor straterra (more irritable) and quelbree( suicidal), clonidine (increased thirst), wellbutrin (made worse), vyvanse (side effects-jaw clenching), adderall 7.5 BID (wore off), buspar 10 (headaches/sedating)  Self Harm: Denies  Suicide Attempts:Denies   Psychosis, Anxiety, Depression: N/A    Past Medical History:  Past Medical History:   Diagnosis Date    ADHD (attention deficit hyperactivity disorder) 2021    Srinivasa always known srinivasa had a problem with focusing and paying attention and it progressively was getting worse    Atrial fibrillation     Chronic pain disorder     Chronic back pain    Depression Danny 3    On 40mg of Prozac    Headache 2022    Started happening after COVID booster    Low back pain 20 years ago    Nerve ablation 2022    Nephrolithiasis     PTSD (post-traumatic stress disorder) 2023    Severe Right knee pain knowing its bone on bone but terrified to have surgery again       Substance Abuse History:   Types:Denies all, including illicit  Withdrawal Symptoms:Denies  Longest Period Sober:Not Applicable   AA: Not applicable     Social History:  Social History     Socioeconomic History    Marital status:     Number of children: 3   Tobacco Use    Smoking status: Never    Smokeless tobacco: Never   Vaping Use    Vaping status: Never Used   Substance and Sexual Activity    Alcohol use: Yes     Alcohol/week: 1.0 standard drink of alcohol     Types: 1 Cans of beer per week     Comment: 1 per day    Drug use: Never    Sexual activity: Yes     Partners: Female     Birth control/protection: Vasectomy       Family History:  Family History   Problem Relation Age of Onset    Heart attack Mother     Suicide Attempts Sister     Suicide Attempts Sister     Cancer Maternal Grandmother     Lung cancer Paternal Grandfather        Past  Surgical History:  Past Surgical History:   Procedure Laterality Date    APPENDECTOMY  09/2010    BICEPS TENDON REPAIR Right 5/16/2025    Procedure: SHOULDER ARTHROSCOPY WITH BICEPS TENODESIS RIGHT;  Surgeon: Charbel James MD;  Location:  [facility] OR;  Service: Orthopedics;  Laterality: Right;    CARDIAC ABLATION  11/2019    CERVICAL FUSION  10/2012    COLONOSCOPY  11/2021    JOINT REPLACEMENT  Dec 1 2022    Partial knee replacement Left knee    KNEE ARTHROPLASTY, PARTIAL REPLACEMENT  12/2022    KNEE ARTHROSCOPY  05/2021    RESECTION DISTAL CLAVICLE Right 5/16/2025    Procedure: OPEN DISTAL CLAVICAL EXCISION;  Surgeon: Charbel James MD;  Location:  [facility] OR;  Service: Orthopedics;  Laterality: Right;    SHOULDER ARTHROSCOPY      VASECTOMY  Dec 2014       Problem List:  Patient Active Problem List   Diagnosis    Paroxysmal atrial fibrillation    Mixed hyperlipidemia    Primary osteoarthritis of both knees    Degeneration of lumbar or lumbosacral intervertebral disc    Toenail fungus    Bipolar affective disorder    Depression    Attention deficit hyperactivity disorder (ADHD)       Allergy:   Allergies   Allergen Reactions    Viloxazine Hcl Er Mental Status Change     Suicidal thoughts        Current Medications:   Current Outpatient Medications   Medication Sig Dispense Refill    [START ON 7/24/2025] amphetamine-dextroamphetamine XR (ADDERALL XR) 25 MG 24 hr capsule Take 1 capsule by mouth Daily 30 capsule 0    atorvastatin (LIPITOR) 20 MG tablet TAKE 1 TABLET BY MOUTH EVERY DAY IN THE EVENING (Patient taking differently: Take 1 tablet by mouth Every Night.) 90 tablet 3    FLUoxetine (PROzac) 40 MG capsule TAKE 1 CAPSULE BY MOUTH EVERY DAY 90 capsule 0    lamoTRIgine  MG tablet sustained-release 24 hour TAKE 1 TABLET BY MOUTH EVERY DAY 90 tablet 0    ondansetron (ZOFRAN) 4 MG tablet Take 1 tablet by mouth Every 8 (Eight) Hours As Needed for post-op nausea. 30 tablet 0    tiZANidine (ZANAFLEX) 4  MG tablet TAKE 1 TABLET BY MOUTH EVERY 8 HOURS AS NEEDED FOR MUSCLE SPASMS. 270 tablet 0     No current facility-administered medications for this visit.       Review of Systems:    Review of Systems   Constitutional: Negative.    HENT: Negative.     Eyes: Negative.    Respiratory: Negative.     Cardiovascular: Negative.    Gastrointestinal: Negative.    Endocrine: Negative.    Genitourinary: Negative.         Kidney stone   Musculoskeletal:  Positive for back pain.   Skin: Negative.    Allergic/Immunologic: Negative.    Neurological: Negative.  Negative for seizures.   Hematological: Negative.    Psychiatric/Behavioral:  Positive for sleep disturbance and stress. The patient is nervous/anxious.          Physical Exam:   Physical Exam  Constitutional:       Appearance: Normal appearance. He is normal weight.   HENT:      Head: Normocephalic.      Nose: Nose normal.   Pulmonary:      Effort: Pulmonary effort is normal.   Musculoskeletal:         General: Normal range of motion.      Cervical back: Normal range of motion.   Neurological:      General: No focal deficit present.      Mental Status: He is alert and oriented to person, place, and time. Mental status is at baseline.   Psychiatric:         Attention and Perception: Attention and perception normal.         Mood and Affect: Affect normal. Mood is anxious.         Speech: Speech normal.         Behavior: Behavior normal. Behavior is cooperative.         Thought Content: Thought content normal.         Cognition and Memory: Cognition normal. Memory is impaired.         Judgment: Judgment normal.     Vitals:  There were no vitals taken for this visit. There is no height or weight on file to calculate BMI.  Due to extenuating circumstances and possible current health risks associated with the patient being present in a clinical setting (with current health restrictions in place in regards to possible COVID 19 transmission/exposure), the patient was seen remotely  today via a [employer] Video Visit through [employer].  Unable to obtain vital signs due to nature of remote visit.  Height stated at 6ft1 inches.  Weight stated at 183 pounds.    Last 3 Blood Pressure Readings:  BP Readings from Last 3 Encounters:   05/16/25 99/56   04/04/25 110/68   03/11/25 110/70     PHQ-9 Depression Screening  Little interest or pleasure in doing things? (Patient-Rptd) Several days   Feeling down, depressed, or hopeless? (Patient-Rptd) Several days   PHQ-2 Total Score (Patient-Rptd) 2   Trouble falling or staying asleep, or sleeping too much? (Patient-Rptd) Over half   Feeling tired or having little energy? (Patient-Rptd) Not at all   Poor appetite or overeating? (Patient-Rptd) Not at all   Feeling bad about yourself - or that you are a failure or have let yourself or your family down? (Patient-Rptd) Not at all   Trouble concentrating on things, such as reading the newspaper or watching television? (Patient-Rptd) Not at all   Moving or speaking so slowly that other people could have noticed? Or the opposite - being so fidgety or restless that you have been moving around a lot more than usual? (Patient-Rptd) Not at all   Thoughts that you would be better off dead, or of hurting yourself in some way? Not at all   PHQ-9 Total Score 4   If you checked off any problems, how difficult have these problems made it for you to do your work, take care of things at home, or get along with other people? (Patient-Rptd) Not difficult at all     JACQUELINE: 3    Mental Status Exam:   Hygiene:   good  Cooperation:  Cooperative  Eye Contact:  Good  Psychomotor Behavior:  Appropriate  Affect:  Appropriate  Mood: anxious  Hopelessness: Denies  Speech:  Normal  Thought Process:  Goal directed  Thought Content:  Normal  Suicidal:  None  Homicidal:  None  Hallucinations:  None  Delusion:  None  Memory:  Intact  Orientation:  Grossly intact  Reliability:  good  Insight:  Good  Judgement:  Fair  Impulse Control:   Fair  Physical/Medical Issues:  back surgeries, knee surgeries, cardiac ablation 2019       Lab Results:   Pre-Admission Testing on 05/02/2025   Component Date Value Ref Range Status    Hemoglobin A1C 05/02/2025 5.50  4.80 - 5.60 % Final    Protime 05/02/2025 13.3  12.2 - 15.3 Seconds Final    INR 05/02/2025 0.95  0.89 - 1.12 Final    PTT 05/02/2025 29.4  22.0 - 39.0 seconds Final    Glucose 05/02/2025 95  65 - 99 mg/dL Final    BUN 05/02/2025 15  6 - 20 mg/dL Final    Creatinine 05/02/2025 0.91  0.76 - 1.27 mg/dL Final    Sodium 05/02/2025 139  136 - 145 mmol/L Final    Potassium 05/02/2025 4.3  3.5 - 5.2 mmol/L Final    Slight hemolysis detected by analyzer. Result may be falsely elevated.    Chloride 05/02/2025 102  98 - 107 mmol/L Final    CO2 05/02/2025 29.0  22.0 - 29.0 mmol/L Final    Calcium 05/02/2025 9.3  8.6 - 10.5 mg/dL Final    Total Protein 05/02/2025 6.8  6.0 - 8.5 g/dL Final    Albumin 05/02/2025 4.3  3.5 - 5.2 g/dL Final    ALT (SGPT) 05/02/2025 40  1 - 41 U/L Final    AST (SGOT) 05/02/2025 30  1 - 40 U/L Final    Alkaline Phosphatase 05/02/2025 101  39 - 117 U/L Final    Total Bilirubin 05/02/2025 0.3  0.0 - 1.2 mg/dL Final    Globulin 05/02/2025 2.5  gm/dL Final    Calculated Result    A/G Ratio 05/02/2025 1.7  g/dL Final    BUN/Creatinine Ratio 05/02/2025 16.5  7.0 - 25.0 Final    Anion Gap 05/02/2025 8.0  5.0 - 15.0 mmol/L Final    eGFR 05/02/2025 102.0  >60.0 mL/min/1.73 Final    WBC 05/02/2025 5.98  3.40 - 10.80 10*3/mm3 Final    RBC 05/02/2025 4.99  4.14 - 5.80 10*6/mm3 Final    Hemoglobin 05/02/2025 14.6  13.0 - 17.7 g/dL Final    Hematocrit 05/02/2025 44.2  37.5 - 51.0 % Final    MCV 05/02/2025 88.6  79.0 - 97.0 fL Final    MCH 05/02/2025 29.3  26.6 - 33.0 pg Final    MCHC 05/02/2025 33.0  31.5 - 35.7 g/dL Final    RDW 05/02/2025 13.2  12.3 - 15.4 % Final    RDW-SD 05/02/2025 42.8  37.0 - 54.0 fl Final    MPV 05/02/2025 10.6  6.0 - 12.0 fL Final    Platelets 05/02/2025 168  140 - 450  10*3/mm3 Final    Neutrophil % 05/02/2025 53.0  42.7 - 76.0 % Final    Lymphocyte % 05/02/2025 33.1  19.6 - 45.3 % Final    Monocyte % 05/02/2025 8.0  5.0 - 12.0 % Final    Eosinophil % 05/02/2025 5.4  0.3 - 6.2 % Final    Basophil % 05/02/2025 0.3  0.0 - 1.5 % Final    Immature Grans % 05/02/2025 0.2  0.0 - 0.5 % Final    Neutrophils, Absolute 05/02/2025 3.17  1.70 - 7.00 10*3/mm3 Final    Lymphocytes, Absolute 05/02/2025 1.98  0.70 - 3.10 10*3/mm3 Final    Monocytes, Absolute 05/02/2025 0.48  0.10 - 0.90 10*3/mm3 Final    Eosinophils, Absolute 05/02/2025 0.32  0.00 - 0.40 10*3/mm3 Final    Basophils, Absolute 05/02/2025 0.02  0.00 - 0.20 10*3/mm3 Final    Immature Grans, Absolute 05/02/2025 0.01  0.00 - 0.05 10*3/mm3 Final    nRBC 05/02/2025 0.0  0.0 - 0.2 /100 WBC Final   Office Visit on 03/11/2025   Component Date Value Ref Range Status    WBC 03/11/2025 7.15  3.40 - 10.80 10*3/mm3 Final    RBC 03/11/2025 5.10  4.14 - 5.80 10*6/mm3 Final    Hemoglobin 03/11/2025 15.2  13.0 - 17.7 g/dL Final    Hematocrit 03/11/2025 45.6  37.5 - 51.0 % Final    MCV 03/11/2025 89.4  79.0 - 97.0 fL Final    MCH 03/11/2025 29.8  26.6 - 33.0 pg Final    MCHC 03/11/2025 33.3  31.5 - 35.7 g/dL Final    RDW 03/11/2025 12.9  12.3 - 15.4 % Final    RDW-SD 03/11/2025 42.7  37.0 - 54.0 fl Final    MPV 03/11/2025 11.0  6.0 - 12.0 fL Final    Platelets 03/11/2025 204  140 - 450 10*3/mm3 Final    Glucose 03/11/2025 92  65 - 99 mg/dL Final    BUN 03/11/2025 16  6 - 20 mg/dL Final    Creatinine 03/11/2025 1.04  0.76 - 1.27 mg/dL Final    Sodium 03/11/2025 140  136 - 145 mmol/L Final    Potassium 03/11/2025 4.6  3.5 - 5.2 mmol/L Final    Chloride 03/11/2025 104  98 - 107 mmol/L Final    CO2 03/11/2025 25.7  22.0 - 29.0 mmol/L Final    Calcium 03/11/2025 9.1  8.6 - 10.5 mg/dL Final    Total Protein 03/11/2025 6.9  6.0 - 8.5 g/dL Final    Albumin 03/11/2025 3.9  3.5 - 5.2 g/dL Final    ALT (SGPT) 03/11/2025 37  1 - 41 U/L Final    AST (SGOT)  03/11/2025 30  1 - 40 U/L Final    Alkaline Phosphatase 03/11/2025 86  39 - 117 U/L Final    Total Bilirubin 03/11/2025 0.3  0.0 - 1.2 mg/dL Final    Globulin 03/11/2025 3.0  gm/dL Final    A/G Ratio 03/11/2025 1.3  g/dL Final    BUN/Creatinine Ratio 03/11/2025 15.4  7.0 - 25.0 Final    Anion Gap 03/11/2025 10.3  5.0 - 15.0 mmol/L Final    eGFR 03/11/2025 86.9  >60.0 mL/min/1.73 Final    Total Cholesterol 03/11/2025 138  0 - 200 mg/dL Final    Triglycerides 03/11/2025 62  0 - 150 mg/dL Final    HDL Cholesterol 03/11/2025 49  40 - 60 mg/dL Final    LDL Cholesterol  03/11/2025 76  0 - 100 mg/dL Final    VLDL Cholesterol 03/11/2025 13  5 - 40 mg/dL Final    LDL/HDL Ratio 03/11/2025 1.56   Final    TSH 03/11/2025 3.140  0.270 - 4.200 uIU/mL Final    PSA 03/11/2025 0.898  0.000 - 4.000 ng/mL Final    Vitamin B-12 03/11/2025 409  211 - 946 pg/mL Final    Color 03/11/2025 Yellow  Yellow, Straw, Dark Yellow, Tali Final    Clarity, UA 03/11/2025 Clear  Clear Final    Specific Gravity  03/11/2025 1.015  1.005 - 1.030 Final    pH, Urine 03/11/2025 6.0  5.0 - 8.0 Final    Leukocytes 03/11/2025 Negative  Negative Final    Nitrite, UA 03/11/2025 Negative  Negative Final    Protein, POC 03/11/2025 Negative  Negative mg/dL Final    Glucose, UA 03/11/2025 Negative  Negative mg/dL Final    Ketones, UA 03/11/2025 Negative  Negative Final    Urobilinogen, UA 03/11/2025 Normal  Normal, 0.2 E.U./dL Final    Bilirubin 03/11/2025 Negative  Negative Final    Blood, UA 03/11/2025 Negative  Negative Final    Lot Number 03/11/2025 [id number]   Final    Expiration Date 03/11/2025 1,242,026   Final         Assessment & Plan   Problems Addressed this Visit          Mental Health    Attention deficit hyperactivity disorder (ADHD) - Primary    Relevant Medications    amphetamine-dextroamphetamine XR (ADDERALL XR) 25 MG 24 hr capsule (Start on 7/24/2025)     Other Visit Diagnoses         Bipolar affective disorder, mixed        Relevant  Medications    amphetamine-dextroamphetamine XR (ADDERALL XR) 25 MG 24 hr capsule (Start on 7/24/2025)      Seasonal depression        Relevant Medications    amphetamine-dextroamphetamine XR (ADDERALL XR) 25 MG 24 hr capsule (Start on 7/24/2025)      Medication management        Relevant Medications    amphetamine-dextroamphetamine XR (ADDERALL XR) 25 MG 24 hr capsule (Start on 7/24/2025)          Diagnoses         Codes Comments      Attention deficit hyperactivity disorder (ADHD), combined type    -  Primary ICD-10-CM: F90.2  ICD-9-CM: 314.01       Bipolar affective disorder, mixed     ICD-10-CM: F31.60  ICD-9-CM: 296.60       Seasonal depression     ICD-10-CM: F33.8  ICD-9-CM: 296.99       Medication management     ICD-10-CM: Z79.899  ICD-9-CM: V58.69             Visit Diagnoses:    ICD-10-CM ICD-9-CM   1. Attention deficit hyperactivity disorder (ADHD), combined type  F90.2 314.01   2. Bipolar affective disorder, mixed  F31.60 296.60   3. Seasonal depression  F33.8 296.99   4. Medication management  Z79.899 V58.69       UDS Juan David mendoza recently reviewed and appropriate.  Discussed holding Adderall for a week or 2 to see if this will help with sleep, did send future order for refilled though as this has been beneficial in addressing restlessness in the past.  Also discussed having conversations with physical therapy to see if they have tricks for sleep disturbances or devices they can use due to postop status.  Discussed sleeping and recliner may be more beneficial to reduce nighttime swelling if this is occurring and help with pain at night with sleeping.  Continue Lamictal and Prozac as prescribed, patient does not feel medication changes are warranted today and voices stability with use of maintenance medications.  Does not need refills at this point. Previously educated upon side effects with use of these medications as well as when to present for emergency services, denies at this time. Instructions  sent regarding use of medications attached to visit of initial prescribing date.  Follow up this provider in 4 weeks or sooner if needed.    Risks, benefits, alternatives discussed with patient including GI upset, nausea vomiting diarrhea, theoretical decrease of seizure threshold predisposing the patient to a slightly higher seizure risk, headaches, sexual dysfunction, serotonin syndrome, bleeding risk, increased suicidality in patients 24 years and younger, switching to lesa/hypomania.  After discussion of these risks and benefits, the patient voiced understanding and agreed to proceed.       The patient is being prescribed a controlled substance as part of the treatment plan. The patient has been educated of appropriate use of the medication(s), including risks and side effects such as insomnia, headache, exacerbation of tics, nervousness, irritability, overstimulation, tremor, dizziness, anorexia or change in appetite, nausea, dry mouth, constipation, diarrhea, weight loss, sexual dysfunction, psychotic episodes, seizures, palpitations, tachycardia, hypertension, rare activation (activation of hypomania, lesa, and/or suicidal ideations), cardiovascular adverse effects including sudden death (especially patients with pre-existing structural abnormalities often associated with a family history of cardiac disease), may slow normal growth in children, weight gain is reported but not expected, sedation is possible but activation is much more common, metabolic adversities, the risk of tolerance and dependence, and overdose among others. Without the prescribed medication for ADHD, it is reported that the patient has problems with attention and focus including being easily distracted, easily losing objects, trouble with time management, trouble completing tasks because of distractions, procrastination, indecisiveness, careless mistakes in daily activities/work, and not finishing jobs that are started. The goals and  objectives with treatment have been discussed including management of reported symptoms of ADHD, to minimize the impact of ADHD symptoms on patient function while maximizing the patient's ability to compensate or cope with any remaining difficulties, and to assist the patient with being successful and productive in their life/daily pursuits. The patient denies any side effects, no cardiovascular symptoms including palpitations or hypertension, no development or worsening of insomnia, and no development or worsening of anxiety symptoms on the medication. Due to the reported problems without the medication usage, as well as the reported significant improvement in symptoms with the medication usage, the patient requests to remain on the prescribed medication for ADHD and does not feel tapering or coming off of medication at this time is appropriate. The reported symptoms of ADHD, any reported resolution of symptoms, and the necessity and appropriateness for continued treatment with a controlled substance will be reevaluated at each encounter. Patient is also informed that the medication is to be used by the patient only, the medication is to be used only as directed, and the medication should not be combined with other substances, OTC or prescription, unless directed by a Provider/Prescriber. The patient verbalized understanding and agreement with this in their own words, and wish to continue with the current treatment plan.       GOALS:  Short Term Goals: Patient will be compliant with medication, and patient will have no significant medication related side effects.  Patient will be engaged in psychotherapy as indicated.  Patient will report subjective improvement of symptoms.  Long term goals: To stabilize mood and treat/improve subjective symptoms, the patient will stay out of the hospital, the patient will be at an optimal level of functioning, and the patient will take all medications as prescribed.  The  patient/guardian verbalized understanding and agreement with goals that were mutually set.      TREATMENT PLAN: Continue supportive psychotherapy efforts and medications as indicated.  Pharmacological and Non-Pharmacological treatment options discussed during today's visit. Patient/Guardian acknowledged and verbally consented with current treatment plan and was educated on the importance of compliance with treatment and follow-up appointments.      MEDICATION ISSUES:  Discussed medication options and treatment plan of prescribed medication as well as the risks, benefits, any black box warnings, and side effects including potential falls, possible impaired driving, and metabolic adversities among others. Patient is agreeable to call the office with any worsening of symptoms or onset of side effects, or if any concerns or questions arise.  The contact information for the office is made available to the patient. Patient is agreeable to call 911 or go to the nearest ER should they begin having any SI/HI, or if any urgent concerns arise. No medication side effects or related complaints today.     MEDS ORDERED DURING VISIT:  New Medications Ordered This Visit   Medications    amphetamine-dextroamphetamine XR (ADDERALL XR) 25 MG 24 hr capsule     Sig: Take 1 capsule by mouth Daily     Dispense:  30 capsule     Refill:  0     No early refills       Follow Up Appointment:   Return in about 4 weeks (around 8/11/2025) for Recheck.             This document has been electronically signed by DEVEN Murillo  July 14, 2025 15:10 EDT    Some of the data in this electronic note has been brought forward from a previous encounter, any necessary changes have been made, it has been reviewed by this APRN, and it is accurate.    Dictated Utilizing Dragon Dictation: Part of this note may be an electronic transcription/translation of spoken language to printed text using the Dragon Dictation System.

## 2025-08-20 ENCOUNTER — TELEPHONE (OUTPATIENT)
Dept: PSYCHIATRY | Facility: CLINIC | Age: 50
End: 2025-08-20
Payer: COMMERCIAL

## 2025-08-21 ENCOUNTER — TELEMEDICINE (OUTPATIENT)
Dept: PSYCHIATRY | Facility: CLINIC | Age: 50
End: 2025-08-21
Payer: COMMERCIAL

## 2025-08-21 ENCOUNTER — TELEPHONE (OUTPATIENT)
Dept: PSYCHIATRY | Facility: CLINIC | Age: 50
End: 2025-08-21

## 2025-08-21 DIAGNOSIS — R45.851 SUICIDAL IDEATIONS: Primary | ICD-10-CM

## 2025-08-21 DIAGNOSIS — Z79.899 MEDICATION MANAGEMENT: ICD-10-CM

## 2025-08-21 DIAGNOSIS — F31.60 BIPOLAR AFFECTIVE DISORDER, MIXED: ICD-10-CM

## 2025-08-25 ENCOUNTER — TELEPHONE (OUTPATIENT)
Dept: PSYCHIATRY | Facility: CLINIC | Age: 50
End: 2025-08-25
Payer: COMMERCIAL

## 2025-08-28 ENCOUNTER — TELEPHONE (OUTPATIENT)
Dept: PSYCHIATRY | Facility: CLINIC | Age: 50
End: 2025-08-28
Payer: COMMERCIAL

## (undated) DEVICE — BLD CUT FORMLA RESECTOR 4.0MM

## (undated) DEVICE — TUBING, SUCTION, 1/4" X 10', STRAIGHT: Brand: MEDLINE

## (undated) DEVICE — SOL ISO/ALC RUB 70PCT 4OZ

## (undated) DEVICE — 90-S CRUISE, SUCTION PROBE, NON-BENDABLE, MAX CUT LEVEL 1: Brand: SERFAS ENERGY

## (undated) DEVICE — KT PUMP INFUBLOCK MDL 2100 PMKITSOLIS

## (undated) DEVICE — INTENDED FOR TISSUE SEPARATION, AND OTHER PROCEDURES THAT REQUIRE A SHARP SURGICAL BLADE TO PUNCTURE OR CUT.: Brand: BARD-PARKER ® STAINLESS STEEL BLADES

## (undated) DEVICE — SUT VIC 0 UR6 27IN VCP603H

## (undated) DEVICE — TB CASSET ARTHSCP CROSSFLOW INFLOW LF

## (undated) DEVICE — NEEDLE, QUINCKE, 18GX3.5": Brand: MEDLINE

## (undated) DEVICE — CANN PASSPORT BUTN 10MM 4CM

## (undated) DEVICE — SYR LL W/SCALE/MARK 3ML STRL

## (undated) DEVICE — UNDERGLV SURG BIOGEL INDICAT PI SZ8 BLU

## (undated) DEVICE — DRP SURG U/DRP INVISISHIELD PA 48X52IN

## (undated) DEVICE — INTENDED TO SUPPORT AND MAINTAIN THE POSITION OF AN ANESTHETIZED PATIENT DURING SURGERY: Brand: ERIN BEACH CHAIR FACE MASK

## (undated) DEVICE — DRSNG PAD ABD 8X10IN STRL

## (undated) DEVICE — INTENDED TO SUPPORT AND MAINTAIN THE POSITION OF AN ANESTHETIZED PATIENT DURING SURGERY: Brand: MARCO SHOULDER STABILIZATION KIT

## (undated) DEVICE — PATIENT RETURN ELECTRODE, SINGLE-USE, CONTACT QUALITY MONITORING, ADULT, WITH 9FT CORD, FOR PATIENTS WEIGING OVER 33LBS. (15KG): Brand: MEGADYNE

## (undated) DEVICE — BLANKT WARM LOWR/BDY 100X120CM

## (undated) DEVICE — NDL FLTR BLNT 18G 1 1/2IN

## (undated) DEVICE — PK MAJ SHLDR SPLT 10

## (undated) DEVICE — GLV SURG SENSICARE PI ORTHO SZ7.5 LF STRL

## (undated) DEVICE — DRAPE,TOWEL,LARGE,INVISISHIELD: Brand: MEDLINE

## (undated) DEVICE — SPNG GZ WOVN 4X4IN 12PLY 10/BX STRL

## (undated) DEVICE — GLV SURG SENSICARE PI MIC PF SZ7.5 LF STRL

## (undated) DEVICE — SUT ETHLN 3/0 FS1 30IN 669H

## (undated) DEVICE — 3M™ MEDIPORE™ H SOFT CLOTH SURGICAL TAPE, 2863, 3 IN X 10 YD, 12/CASE: Brand: 3M™ MEDIPORE™

## (undated) DEVICE — SKN PREP SPNG STKS PVP PNT STR: Brand: MEDLINE INDUSTRIES, INC.

## (undated) DEVICE — Device

## (undated) DEVICE — GOWN,SIRUS,NONRNF,SETINSLV,XL,20/CS: Brand: MEDLINE